# Patient Record
Sex: MALE | Race: WHITE | NOT HISPANIC OR LATINO | Employment: FULL TIME | URBAN - METROPOLITAN AREA
[De-identification: names, ages, dates, MRNs, and addresses within clinical notes are randomized per-mention and may not be internally consistent; named-entity substitution may affect disease eponyms.]

---

## 2017-01-15 DIAGNOSIS — I10 HYPERTENSION GOAL BP (BLOOD PRESSURE) < 140/90: Primary | ICD-10-CM

## 2017-01-15 NOTE — TELEPHONE ENCOUNTER
Atenolol      Last Written Prescription Date: 12/13/16  Last Fill Quantity: 30, # refills: 3    Last Office Visit with [facility], [facility] or [facility] prescribing provider:  12/14/16   Future Office Visit:        BP Readings from Last 3 Encounters:   12/14/16 136/88   10/05/15 124/84   07/02/15 132/84

## 2017-01-16 RX ORDER — ATENOLOL 50 MG/1
50 TABLET ORAL DAILY
Qty: 90 TABLET | Refills: 1 | Status: SHIPPED | OUTPATIENT
Start: 2017-01-16 | End: 2017-07-19

## 2017-07-07 ENCOUNTER — TELEPHONE (OUTPATIENT)
Dept: FAMILY MEDICINE | Facility: CLINIC | Age: 42
End: 2017-07-07

## 2017-07-07 NOTE — LETTER
[facility]  [address] [place] [address]  Phone: [phone]  Fax: [phone]    July 7, 2017    Bon Perkins  [address] [facility] [address]            To Whom it May Concern,    Bon Perkins has a medical illness. Please excuse him from work Jule 6 and July 7th.       Sincerely,      Justin Acosta MD

## 2017-07-07 NOTE — TELEPHONE ENCOUNTER
Reason for Call:  Other    Detailed comments: Bon has a flare of his gout in his Right ankle. He missed work yesterday and today. He has medication to treat. He wants to know if Dr Acosta can write a work note to excuse him or if he has to be seen.   He can have his wife  the note at the .    Phone Number Patient can be reached at: Home number on file [phone] (home)    Best Time: anytime    Can we leave a detailed message on this number? YES    Call taken on 7/7/2017 at 8:44 AM by Gypsy Simpson

## 2017-07-07 NOTE — TELEPHONE ENCOUNTER
Dr Acosta-Letter pended if you are ok with request. Please sign and print if acceptable. Dixie Lockhart RN

## 2017-07-19 DIAGNOSIS — I10 HYPERTENSION GOAL BP (BLOOD PRESSURE) < 140/90: ICD-10-CM

## 2017-07-20 RX ORDER — ATENOLOL 50 MG/1
TABLET ORAL
Qty: 90 TABLET | Refills: 1 | Status: SHIPPED | OUTPATIENT
Start: 2017-07-20 | End: 2017-07-21

## 2017-07-20 NOTE — TELEPHONE ENCOUNTER
atenolol (TENORMIN) 50 MG tablet      Last Written Prescription Date: 1/16/2017  Last Fill Quantity: 90, # refills: 1    Last Office Visit with [facility], [facility] or [facility] prescribing provider:  12/14/2016   Future Office Visit:    Next 5 appointments (look out 90 days)     Jul 21, 2017 12:40 PM CDT   SHORT with Justin Acosta MD   [facility]    [address] [place] [address] [phone]                    BP Readings from Last 3 Encounters:   12/14/16 136/88   10/05/15 124/84   07/02/15 132/84

## 2017-07-21 ENCOUNTER — OFFICE VISIT (OUTPATIENT)
Dept: FAMILY MEDICINE | Facility: CLINIC | Age: 42
End: 2017-07-21
Payer: COMMERCIAL

## 2017-07-21 VITALS
HEIGHT: 74 IN | RESPIRATION RATE: 16 BRPM | SYSTOLIC BLOOD PRESSURE: 140 MMHG | HEART RATE: 82 BPM | TEMPERATURE: 99.3 F | DIASTOLIC BLOOD PRESSURE: 96 MMHG | BODY MASS INDEX: 36.83 KG/M2 | WEIGHT: 287 LBS

## 2017-07-21 DIAGNOSIS — I10 HYPERTENSION GOAL BP (BLOOD PRESSURE) < 140/90: ICD-10-CM

## 2017-07-21 DIAGNOSIS — E79.0 HYPERURICEMIA: ICD-10-CM

## 2017-07-21 PROCEDURE — 99214 OFFICE O/P EST MOD 30 MIN: CPT | Performed by: FAMILY MEDICINE

## 2017-07-21 RX ORDER — ATENOLOL 50 MG/1
50 TABLET ORAL DAILY
Qty: 90 TABLET | Refills: 3 | Status: SHIPPED | OUTPATIENT
Start: 2017-07-21 | End: 2017-08-16

## 2017-07-21 RX ORDER — INDOMETHACIN 25 MG/1
25-50 CAPSULE ORAL
Qty: 30 CAPSULE | Refills: 3 | Status: SHIPPED | OUTPATIENT
Start: 2017-07-21 | End: 2020-08-26

## 2017-07-21 RX ORDER — ALLOPURINOL 300 MG/1
300 TABLET ORAL DAILY
Qty: 90 TABLET | Refills: 3 | Status: SHIPPED | OUTPATIENT
Start: 2017-07-21 | End: 2017-08-16

## 2017-07-21 RX ORDER — LISINOPRIL 10 MG/1
10 TABLET ORAL DAILY
Qty: 30 TABLET | Refills: 1 | Status: SHIPPED | OUTPATIENT
Start: 2017-07-21 | End: 2017-08-16 | Stop reason: DRUGHIGH

## 2017-07-21 NOTE — LETTER
[facility]  [address] [place] [address]  Phone: [phone]  Fax: [phone]    07/21/17            To Kacey Lucio,      Bon was seen in clinic today for follow up on his blood pressure. He requested we send you a fax stating he was seen. Dr. Acosta has reviewed his medication and blood pressure  and has made a plan with Bon for better blood pressure control. If you have any questions please contact me at the clinic.            Sincerely,      Nurys Luke

## 2017-07-21 NOTE — NURSING NOTE
"Chief Complaint   Patient presents with     Hypertension       Initial BP (!) 140/96  Pulse 82  Temp 99.3  F (37.4  C) (Tympanic)  Resp 16  Ht 6' 2\" (1.88 m)  Wt 287 lb (130.2 kg)  BMI 36.85 kg/m2 Estimated body mass index is 36.85 kg/(m^2) as calculated from the following:    Height as of this encounter: 6' 2\" (1.88 m).    Weight as of this encounter: 287 lb (130.2 kg).  Medication Reconciliation: complete    Health Maintenance that is potentially due pending provider review:  BP        Is there anyone who you would like to be able to receive your results? No  If yes have patient fill out IRMA    "

## 2017-07-21 NOTE — PROGRESS NOTES
"  SUBJECTIVE:                                                    Bon Perkins is a 41 year old male who presents to clinic today for the following health issues:  Chief Complaint   Patient presents with     Hypertension      Hypertension Follow-up      Outpatient blood pressures are not being checked.    Low Salt Diet: no added salt        Amount of exercise or physical activity: no    Problems taking medications regularly: No    Medication side effects: none    BP was 168/100 when checked.    He has been on atenolol for years.  No side effects noted.    Weight up 30# in the past 2 years after stopping chewing tobacco.     Occupation: .     Wt Readings from Last 5 Encounters:   07/21/17 287 lb (130.2 kg)   12/14/16 287 lb (130.2 kg)   10/05/15 274 lb 9.6 oz (124.6 kg)   07/02/15 257 lb (116.6 kg)   06/26/15 258 lb 6.4 oz (117.2 kg)     Patient Active Problem List   Diagnosis     ESOPHAGEAL REFLUX     Arthropathy     Gout     CARDIOVASCULAR SCREENING; LDL GOAL LESS THAN 130     Hyperuricemia     Hypertension goal BP (blood pressure) < 140/90     Preseptal cellulitis      Alcohol:about 2-3 times weekly    ROS:  One gout spell right ankle around 7/4.  He was on indomethacin.  Gout much better on the allopurinol.  He feels the flare was from eating and drinking more around 7/4  Resp: No coughing, wheezing or shortness of breath  CV: No chest pains or palpitations    OBJECTIVE:Blood pressure (!) 140/96, pulse 82, temperature 99.3  F (37.4  C), temperature source Tympanic, resp. rate 16, height 6' 2\" (1.88 m), weight 287 lb (130.2 kg). BMI=Body mass index is 36.85 kg/(m^2).  GENERAL APPEARANCE ADULT: Alert, no acute distress, obese     ASSESSMENT:   (I10) Hypertension goal BP (blood pressure) < 140/90  Comment: RUNNING HIGH  Plan: atenolol (TENORMIN) 50 MG tablet        Continue the atenolol daily.   Add lisinopril 10mg daily.   Schedule an appointment with clinic RN in 2 weeks to recheck blood pressure and " check potassium and creatinine blood tests (chem8)    Lifestyle changes that can lower blood pressure include:  Limiting sodium in the diet.  Goal of <2.4 grams daily.  Avoiding salty and prepared foods and not adding salt at the table can help.   Regular exercise at least 30 minutes most days of the week.   Weight loss can help even if not dramatic or down to normal BMI range.  DASH type diet can actually lower blood pressure.   Cutting back on alcohol can help for women drinking more than a drink per day and men more than 2 drinks per day on average.   Quitting smoking can help.      (E79.0) Hyperuricemia  Comment: recent gout  Plan: lisinopril (PRINIVIL/ZESTRIL) 10 MG tablet,         allopurinol (ZYLOPRIM) 300 MG tablet,         indomethacin (INDOCIN) 25 MG capsule, RN HTN         MGMT        If you continue to experience gout spells, let me know and we can increase the allopurinol.

## 2017-07-21 NOTE — MR AVS SNAPSHOT
After Visit Summary   7/21/2017    Bon Perkins    MRN: [id number]           Patient Information     Date Of Birth          1975        Visit Information        Provider Department      7/21/2017 12:40 PM Justin Acosta MD [facility]        Today's Diagnoses     Hypertension goal BP (blood pressure) < 140/90        Hyperuricemia          Care Instructions    ASSESSMENT:   (I10) Hypertension goal BP (blood pressure) < 140/90  Comment: RUNNING HIGH  Plan: atenolol (TENORMIN) 50 MG tablet        Continue the atenolol daily.   Add lisinopril 10mg daily.   Schedule an appointment with clinic RN in 2 weeks to recheck blood pressure and check potassium and creatinine blood tests    Lifestyle changes that can lower blood pressure include:  Limiting sodium in the diet.  Goal of <2.4 grams daily.  Avoiding salty and prepared foods and not adding salt at the table can help.   Regular exercise at least 30 minutes most days of the week.   Weight loss can help even if not dramatic or down to normal BMI range.  DASH type diet can actually lower blood pressure.   Cutting back on alcohol can help for women drinking more than a drink per day and men more than 2 drinks per day on average.   Quitting smoking can help.      (E79.0) Hyperuricemia  Comment: recent gout  Plan: lisinopril (PRINIVIL/ZESTRIL) 10 MG tablet,         allopurinol (ZYLOPRIM) 300 MG tablet,         indomethacin (INDOCIN) 25 MG capsule, RN HTN         MGMT        If you continue to experience gout spells, let me know and we can increase the allopurinol.          Follow-ups after your visit        Who to contact     If you have questions or need follow up information about today's clinic visit or your schedule please contact [facility] directly at [phone].  Normal or non-critical lab and imaging results will be communicated to you by MyChart, letter or phone within 4 business days after the clinic has  "received the results. If you do not hear from us within 7 days, please contact the clinic through [employer] or phone. If you have a critical or abnormal lab result, we will notify you by phone as soon as possible.  Submit refill requests through [employer] or call your pharmacy and they will forward the refill request to us. Please allow 3 business days for your refill to be completed.          Additional Information About Your Visit        [employer] Information     [employer] lets you send messages to your doctor, view your test results, renew your prescriptions, schedule appointments and more. To sign up, go to [email or web address] . Click on \"Log in\" on the left side of the screen, which will take you to the Welcome page. Then click on \"Sign up Now\" on the right side of the page.     You will be asked to enter the access code listed below, as well as some personal information. Please follow the directions to create your username and password.     Your access code is: [email or web address]  Expires: 10/19/2017  1:22 PM     Your access code will  in 90 days. If you need help or a new code, please call your [facility] clinic or [phone].        Care EveryWhere ID     This is your Care EveryWhere ID. This could be used by other organizations to access your [facility] medical records  [id number]        Your Vitals Were     Pulse Temperature Respirations Height BMI (Body Mass Index)       82 99.3  F (37.4  C) (Tympanic) 16 6' 2\" (1.88 m) 36.85 kg/m2        Blood Pressure from Last 3 Encounters:   17 (!) 140/96   16 136/88   10/05/15 124/84    Weight from Last 3 Encounters:   17 287 lb (130.2 kg)   16 287 lb (130.2 kg)   10/05/15 274 lb 9.6 oz (124.6 kg)              We Performed the Following     RN HTN MGMT          Today's Medication Changes          These changes are accurate as of: 17  1:22 PM.  If you have any questions, ask your nurse or doctor.               Start taking these medicines.     "    Dose/Directions    lisinopril 10 MG tablet   Commonly known as:  PRINIVIL/ZESTRIL   Used for:  Hyperuricemia   Started by:  Justin Acosta MD        Dose:  10 mg   Take 1 tablet (10 mg) by mouth daily   Quantity:  30 tablet   Refills:  1         These medicines have changed or have updated prescriptions.        Dose/Directions    atenolol 50 MG tablet   Commonly known as:  TENORMIN   This may have changed:  See the new instructions.   Used for:  Hypertension goal BP (blood pressure) < 140/90   Changed by:  Justin Acosta MD        Dose:  50 mg   Take 1 tablet (50 mg) by mouth daily   Quantity:  90 tablet   Refills:  3            Where to get your medicines      These medications were sent to [place] [address]     Phone:  [phone]     allopurinol 300 MG tablet    atenolol 50 MG tablet    indomethacin 25 MG capsule    lisinopril 10 MG tablet                Primary Care Provider Office Phone # Fax #    Justin Acosta [phone]       [place] [id number]        Equal Access to Services     [facility] : Hadii lan ku hadasho Soomaali, waaxda luqadaha, qaybta kaalmada adeegyada, myra gomez hayace rees . So [facility] [phone].    ATENCIÓN: Si habla español, tiene a mayberry disposición servicios gratuitos de asistencia lingüística. [facility] [phone].    We comply with applicable federal civil rights laws and [place] laws. We do not discriminate on the basis of race, color, national origin, age, disability sex, sexual orientation or gender identity.            Thank you!     Thank you for choosing [facility]  for your care. Our goal is always to provide you with excellent care. Hearing back from our patients is one way we can continue to improve our services. Please take a few minutes to complete the written survey that  you may receive in the mail after your visit with us. Thank you!             Your Updated Medication List - Protect others around you: Learn how to safely use, store and throw away your medicines at www.disposemymeds.org.          This list is accurate as of: 7/21/17  1:22 PM.  Always use your most recent med list.                   Brand Name Dispense Instructions for use Diagnosis    allopurinol 300 MG tablet    ZYLOPRIM    90 tablet    Take 1 tablet (300 mg) by mouth daily    Hyperuricemia       atenolol 50 MG tablet    TENORMIN    90 tablet    Take 1 tablet (50 mg) by mouth daily    Hypertension goal BP (blood pressure) < 140/90       indomethacin 25 MG capsule    INDOCIN    30 capsule    Take 1-2 capsules (25-50 mg) by mouth 3 times daily (with meals)    Hyperuricemia       lisinopril 10 MG tablet    PRINIVIL/ZESTRIL    30 tablet    Take 1 tablet (10 mg) by mouth daily    Hyperuricemia

## 2017-07-21 NOTE — PATIENT INSTRUCTIONS
ASSESSMENT:   (I10) Hypertension goal BP (blood pressure) < 140/90  Comment: RUNNING HIGH  Plan: atenolol (TENORMIN) 50 MG tablet        Continue the atenolol daily.   Add lisinopril 10mg daily.   Schedule an appointment with clinic RN in 2 weeks to recheck blood pressure and check potassium and creatinine blood tests    Lifestyle changes that can lower blood pressure include:  Limiting sodium in the diet.  Goal of <2.4 grams daily.  Avoiding salty and prepared foods and not adding salt at the table can help.   Regular exercise at least 30 minutes most days of the week.   Weight loss can help even if not dramatic or down to normal BMI range.  DASH type diet can actually lower blood pressure.   Cutting back on alcohol can help for women drinking more than a drink per day and men more than 2 drinks per day on average.   Quitting smoking can help.      (E79.0) Hyperuricemia  Comment: recent gout  Plan: lisinopril (PRINIVIL/ZESTRIL) 10 MG tablet,         allopurinol (ZYLOPRIM) 300 MG tablet,         indomethacin (INDOCIN) 25 MG capsule, RN HTN         MGMT        If you continue to experience gout spells, let me know and we can increase the allopurinol.

## 2017-07-21 NOTE — LETTER
[facility]  [address] [place] [address]  [phone]        June 27, 2018    Bon Perkins  [address] [facility] [address]              Dear Bon Perkins    This is to remind you that your Basic profile is due.    You may call our office at [phone] to schedule an appointment.    Please disregard this notice if you have already had your labs drawn or made an appointment.        Sincerely,        Justin Acosta MD

## 2017-07-23 ENCOUNTER — OFFICE VISIT (OUTPATIENT)
Dept: URGENT CARE | Facility: URGENT CARE | Age: 42
End: 2017-07-23
Payer: COMMERCIAL

## 2017-07-23 VITALS
TEMPERATURE: 98.9 F | SYSTOLIC BLOOD PRESSURE: 151 MMHG | BODY MASS INDEX: 37.39 KG/M2 | HEART RATE: 76 BPM | DIASTOLIC BLOOD PRESSURE: 93 MMHG | WEIGHT: 291.2 LBS | OXYGEN SATURATION: 98 %

## 2017-07-23 DIAGNOSIS — M10.9 ACUTE GOUTY ARTHRITIS: Primary | ICD-10-CM

## 2017-07-23 PROCEDURE — 99207 ZZC NO BILLABLE SERVICE THIS VISIT: CPT | Performed by: NURSE PRACTITIONER

## 2017-07-23 NOTE — PROGRESS NOTES
Gout has improved needs a note to return to work.  No intervention done. Will write a note for him to return to work.  No charge visit as no assessment , intervention required.       Blood pressure mild elevated. Has started lisinopril and has follow-up in 2 weeks with Dr Acosta.  He will continue to monitor if remains elevated will follow-up sooner with Primary Care.      Margo Palma CNP

## 2017-07-23 NOTE — LETTER
[facility]  [address] [address]  Phone: [phone]  Fax: [phone]    July 23, 2017        Bon Perkins  [address] [facility] [address]          To whom it may concern:    RE: Bon Perkins    Patient was seen and treated today at our clinic.  Patient may return to work with no restricitons:    Please contact me for questions or concerns.      Sincerely,        TANNER Bermeo CNP

## 2017-07-23 NOTE — LETTER
[facility]  [address] [address]  Phone: [phone]  Fax: [phone]    July 23, 2017        Bon Perkins  [address] [facility] [address]          To whom it may concern:    RE: Bon Perkins    Patient was seen and treated today at our clinic and missed work.7/20, 7/21, and 7/22/2017.     Patient can return to work 7/24/2017 with no restrictions.     Please contact me for questions or concerns.      Sincerely,        TANNER Bermeo CNP

## 2017-07-23 NOTE — MR AVS SNAPSHOT
After Visit Summary   7/23/2017    Bon Perkins    MRN: [id number]           Patient Information     Date Of Birth          1975        Visit Information        Provider Department      7/23/2017 2:55 PM Margo Palma APRN [facility]        Care Instructions    It was discovered today your blood pressure was elevated. Elevated blood pressure is defined as blood pressure greater then 140/80.   Continue to monitor your blood pressure and return for a nurse only blood pressure recheck.  You will be contacted by a nurse in the next two week if you a blood pressure has not been obtained.      If it remains elevated follow-up with your primary care provider or return.  Indications discussed when to go to the Emergency Department         Discharge Instructions: Taking Your Blood Pressure  Blood pressure is the force of blood as it moves from the heart through the blood vessels. You can take your own blood pressure reading using a digital monitor. Take readings as often as your healthcare provider instructs. Take your readings each time in the same way, using the same arm. Here are guidelines for taking your blood pressure.  The American Heart Association (AHA) recommends purchasing a blood pressure monitor that is validated and approved by the Association for the Advancement of Medical Instrumentation, the [place] Hypertension Society, and the International Protocol for the Validation of Automated BP Measuring Devices. If the blood pressure monitor is for a senior adult, a pregnant woman, or a child, make certain it is validated for use with such a population. For the most reliable readings, the AHA recommends an automatic, cuff-style, upper arm (bicep) monitor. The readings from finger and wrist monitors are not as reliable as the upper arm monitor.        Step 1. Relax      Wait at least a half hour after smoking, eating, or exercising. Do not drink coffee, tea,  soda, or other caffeinated beverages before checking your blood pressure.     Sit comfortably at a table. Place the monitor near you.    Rest for a few minutes before you begin.        Step 2. Wrap the cuff      Place your arm on the table, palm up. Put your arm in a position that is level with your heart. Wrap the cuff around your upper arm, about an inch above your elbow. It s best to wrap the cuff on bare skin, not over clothing.    Make sure your cuff fits. If it doesn t wrap around your upper arm, order a larger cuff. A cuff that is too large or too small can result in an inaccurate blood pressure reading.           Step 3. Inflate the cuff      Pump the cuff until the scale reads 200. If you have a self-inflating cuff, push the button that starts the pump.    The cuff will tighten, then loosen.    The numbers will change. When they stop changing, your blood pressure reading will appear.    If you get a reading that is too high or too low for you, relax for a few minutes. Then do the test again.    Step 4. Write down the results    Write down your blood pressure numbers. Gorge the date and time. Keep your results in one place, such as a notebook.    Remove the cuff from your arm. Turn off the machine.    Take the readings with you to your medical appointments.    If you start a new blood pressure medicine, or change a blood pressure medicine dose, note the day you started the new drug or dosage on your blood pressure recording sheet. This will help your healthcare provider monitor the effect of medication changes.     Date Last Reviewed: 4/27/2016 2000-2017 [employer]. [address] [place] [address], [place] [address]. All rights reserved. This information is not intended as a substitute for professional medical care. Always follow your healthcare professional's instructions.                Follow-ups after your visit        Your next 10 appointments already scheduled     Aug 04, 2017  3:00 PM CDT    Nurse Only with [place] NB RN   [facility] ([facility])    [address] [place] [address] [phone]              Who to contact     If you have questions or need follow up information about today's clinic visit or your schedule please contact [facility] directly at [phone].  Normal or non-critical lab and imaging results will be communicated to you by MyChart, letter or phone within 4 business days after the clinic has received the results. If you do not hear from us within 7 days, please contact the clinic through MyChart or phone. If you have a critical or abnormal lab result, we will notify you by phone as soon as possible.  Submit refill requests through [employer] or call your pharmacy and they will forward the refill request to us. Please allow 3 business days for your refill to be completed.          Additional Information About Your Visit        Care EveryWhere ID     This is your Care EveryWhere ID. This could be used by other organizations to access your [facility] medical records  [id number]        Your Vitals Were     Pulse Temperature Pulse Oximetry BMI (Body Mass Index)          76 98.9  F (37.2  C) (Tympanic) 98% 37.39 kg/m2         Blood Pressure from Last 3 Encounters:   07/23/17 (!) 151/93   07/21/17 (!) 140/96   12/14/16 136/88    Weight from Last 3 Encounters:   07/23/17 291 lb 3.2 oz (132.1 kg)   07/21/17 287 lb (130.2 kg)   12/14/16 287 lb (130.2 kg)              Today, you had the following     No orders found for display       Primary Care Provider Office Phone # Fax #    Justin Acosta [phone]       [facility] [address] [place] [address]        Equal Access to Services     NEVILLE QUIROGA : Hernandez Milner, luis condon, qaybta kadamion cintron, myar terrell. So [facility] [phone].    ATENCIÓN: Vikas lagos,  tiene a mayberry disposición servicios gratuitos de asistencia lingüística. Kip anderson [phone].    We comply with applicable federal civil rights laws and [place] laws. We do not discriminate on the basis of race, color, national origin, age, disability sex, sexual orientation or gender identity.            Thank you!     Thank you for choosing [facility]  for your care. Our goal is always to provide you with excellent care. Hearing back from our patients is one way we can continue to improve our services. Please take a few minutes to complete the written survey that you may receive in the mail after your visit with us. Thank you!             Your Updated Medication List - Protect others around you: Learn how to safely use, store and throw away your medicines at www.disposemymeds.org.          This list is accurate as of: 7/23/17  3:06 PM.  Always use your most recent med list.                   Brand Name Dispense Instructions for use Diagnosis    allopurinol 300 MG tablet    ZYLOPRIM    90 tablet    Take 1 tablet (300 mg) by mouth daily    Hyperuricemia       atenolol 50 MG tablet    TENORMIN    90 tablet    Take 1 tablet (50 mg) by mouth daily    Hypertension goal BP (blood pressure) < 140/90       indomethacin 25 MG capsule    INDOCIN    30 capsule    Take 1-2 capsules (25-50 mg) by mouth 3 times daily (with meals)    Hyperuricemia       lisinopril 10 MG tablet    PRINIVIL/ZESTRIL    30 tablet    Take 1 tablet (10 mg) by mouth daily    Hyperuricemia

## 2017-07-23 NOTE — PATIENT INSTRUCTIONS
It was discovered today your blood pressure was elevated. Elevated blood pressure is defined as blood pressure greater then 140/80.   Continue to monitor your blood pressure and return for a nurse only blood pressure recheck.  You will be contacted by a nurse in the next two week if you a blood pressure has not been obtained.      If it remains elevated follow-up with your primary care provider or return.  Indications discussed when to go to the Emergency Department         Discharge Instructions: Taking Your Blood Pressure  Blood pressure is the force of blood as it moves from the heart through the blood vessels. You can take your own blood pressure reading using a digital monitor. Take readings as often as your healthcare provider instructs. Take your readings each time in the same way, using the same arm. Here are guidelines for taking your blood pressure.  The American Heart Association (AHA) recommends purchasing a blood pressure monitor that is validated and approved by the Association for the Advancement of Medical Instrumentation, the [place] Hypertension Society, and the International Protocol for the Validation of Automated BP Measuring Devices. If the blood pressure monitor is for a senior adult, a pregnant woman, or a child, make certain it is validated for use with such a population. For the most reliable readings, the AHA recommends an automatic, cuff-style, upper arm (bicep) monitor. The readings from finger and wrist monitors are not as reliable as the upper arm monitor.        Step 1. Relax      Wait at least a half hour after smoking, eating, or exercising. Do not drink coffee, tea, soda, or other caffeinated beverages before checking your blood pressure.     Sit comfortably at a table. Place the monitor near you.    Rest for a few minutes before you begin.        Step 2. Wrap the cuff      Place your arm on the table, palm up. Put your arm in a position that is level with your heart. Wrap the cuff  around your upper arm, about an inch above your elbow. It s best to wrap the cuff on bare skin, not over clothing.    Make sure your cuff fits. If it doesn t wrap around your upper arm, order a larger cuff. A cuff that is too large or too small can result in an inaccurate blood pressure reading.           Step 3. Inflate the cuff      Pump the cuff until the scale reads 200. If you have a self-inflating cuff, push the button that starts the pump.    The cuff will tighten, then loosen.    The numbers will change. When they stop changing, your blood pressure reading will appear.    If you get a reading that is too high or too low for you, relax for a few minutes. Then do the test again.    Step 4. Write down the results    Write down your blood pressure numbers. Gorge the date and time. Keep your results in one place, such as a notebook.    Remove the cuff from your arm. Turn off the machine.    Take the readings with you to your medical appointments.    If you start a new blood pressure medicine, or change a blood pressure medicine dose, note the day you started the new drug or dosage on your blood pressure recording sheet. This will help your healthcare provider monitor the effect of medication changes.     Date Last Reviewed: 4/27/2016 2000-2017 [employer]. [address] [place] [address], [place] [address]. All rights reserved. This information is not intended as a substitute for professional medical care. Always follow your healthcare professional's instructions.

## 2017-08-15 ENCOUNTER — TELEPHONE (OUTPATIENT)
Dept: FAMILY MEDICINE | Facility: CLINIC | Age: 42
End: 2017-08-15

## 2017-08-16 ENCOUNTER — ALLIED HEALTH/NURSE VISIT (OUTPATIENT)
Dept: FAMILY MEDICINE | Facility: CLINIC | Age: 42
End: 2017-08-16
Payer: COMMERCIAL

## 2017-08-16 VITALS — SYSTOLIC BLOOD PRESSURE: 144 MMHG | RESPIRATION RATE: 18 BRPM | DIASTOLIC BLOOD PRESSURE: 90 MMHG | HEART RATE: 80 BPM

## 2017-08-16 DIAGNOSIS — E79.0 HYPERURICEMIA: ICD-10-CM

## 2017-08-16 DIAGNOSIS — I10 HYPERTENSION GOAL BP (BLOOD PRESSURE) < 140/90: ICD-10-CM

## 2017-08-16 PROCEDURE — 99207 ZZC NO CHARGE NURSE ONLY: CPT

## 2017-08-16 RX ORDER — ALLOPURINOL 300 MG/1
300 TABLET ORAL DAILY
Qty: 90 TABLET | Refills: 3 | Status: SHIPPED | OUTPATIENT
Start: 2017-08-16 | End: 2018-07-26

## 2017-08-16 RX ORDER — ATENOLOL 50 MG/1
50 TABLET ORAL DAILY
Qty: 90 TABLET | Refills: 3 | Status: SHIPPED | OUTPATIENT
Start: 2017-08-16 | End: 2018-07-26

## 2017-08-16 RX ORDER — LISINOPRIL 20 MG/1
20 TABLET ORAL DAILY
Qty: 30 TABLET | Refills: 1 | Status: SHIPPED | OUTPATIENT
Start: 2017-08-16 | End: 2017-09-21

## 2017-08-16 NOTE — PROGRESS NOTES
Bon ePrkins is being followed for Blood Pressure management.    NURSING ASSESSMENT/PLAN:  Blood pressure reading today is not at the provider specified goal of <140/90.    Current blood pressure medication(s):  Lisinopril 10 mg  1.  The patient will dosage change: lisinopril 20 mg.     2.  We will be checking a metabolic lab panel today.      3.  Follow up instructions include:     Next Nurse visit: 2 weeks.    SUBJECTIVE:                                                    The patient is taking medication as prescribed and is tolerating well.   Patient is not monitoring Blood Pressure at home.   Last 3 home readings none  In addition notes: none    Out of the following complicating factors: Cough, Headache, Lightheadedness, Shortness of breath, Fatigue, Nausea, Sexual Dysfunction, New onset of swelling or edema, Weakness and New onset of Chest Pain, the patient reports:  None    OBJECTIVE:                                                    Is pulse 55 or greater? - Yes  Pulse Readings from Last 1 Encounters:   08/16/17 80     Today's BP completed using cuff size: large on right side arm.  BP Readings from Last 3 Encounters:   08/16/17 144/90   07/23/17 (!) 151/93   07/21/17 (!) 140/96       Current Outpatient Prescriptions   Medication Sig Dispense Refill     atenolol (TENORMIN) 50 MG tablet Take 1 tablet (50 mg) by mouth daily 90 tablet 3     lisinopril (PRINIVIL/ZESTRIL) 10 MG tablet Take 1 tablet (10 mg) by mouth daily 30 tablet 1     allopurinol (ZYLOPRIM) 300 MG tablet Take 1 tablet (300 mg) by mouth daily 90 tablet 3     indomethacin (INDOCIN) 25 MG capsule Take 1-2 capsules (25-50 mg) by mouth 3 times daily (with meals) 30 capsule 3     Potassium   Date Value Ref Range Status   12/14/2016 3.7 3.4 - 5.3 mmol/L Final     Creatinine   Date Value Ref Range Status   12/14/2016 0.78 0.66 - 1.25 mg/dL Final     Urea Nitrogen   Date Value Ref Range Status   12/14/2016 14 7 - 30 mg/dL Final     GFR Estimate   Date Value  Ref Range Status   12/14/2016 >90  Non  GFR Calc   >60 mL/min/1.7m2 Final      A baseline potassium, creatinine, BUN, GFR has been done within past 12 months    Education:  general discussion/verbal explanation  Limit dietary sodium intake between 1500-2000mg every day  Regular aerobic exercise.  Discussed habit change regarding diet/weight loss      Professional Reference click here   Copy of current RN HTN MGMT order or refer to Other Orders:  Add blood pressure goal to problem list:  <140/90    Patient is being referred to RN Hypertension Program for the following diagnoses:  Hypertension    RN will confirm Potassium, Creatinine, BUN (or BMP) have been done within the past 12 months.  RN will order follow-up labs according to RN protocol.      According to RN Protocol, start or titrate:     Lisinopril (Zestril, Prinivil) starting dose 10mg may be titrated to 20mg.  Increase by doubling mg every 2 to 3 weeks.  Usual dose 10 mg to 40 mg orally once daily.  Most common side effect: cough.    If blood pressure not at goal after current medication titrated, the following medication(s) may be prescribed and titrated.    Chlorthalidone starting dose 12.5mg may be titrated to 25mg.   Increase by doubling mg every 2 to 3 weeks.  Usual dose 12.5 mg to 25 mg orally once daily.    If blood pressure not at goal after maximum dose reached, consult provider.    If blood pressure at goal, follow up: with Hypertension Nurse in 6 mos.     Contraindications and dosing considerations for current and recommended medications have been reviewed. Yes  Dosage adjustment made based on patient specific, physician directed, care plan.  Adri Cobb RN

## 2017-08-16 NOTE — MR AVS SNAPSHOT
After Visit Summary   8/16/2017    Bon Perkins    MRN: [id number]           Patient Information     Date Of Birth          1975        Visit Information        Provider Department      8/16/2017 9:00 AM [place] NB RN [facility]        Today's Diagnoses     Hyperuricemia        Hypertension goal BP (blood pressure) < 140/90          Care Instructions      NURSING ASSESSMENT/PLAN:  Blood pressure reading today is not at the provider specified goal of <140/90.    Current blood pressure medication(s):  Lisinopril 10 mg  1.  The patient will dosage change: lisinopril 20 mg.          2.  Follow up instructions include:     Next Nurse visit: 2 weeks.          Follow-ups after your visit        Who to contact     If you have questions or need follow up information about today's clinic visit or your schedule please contact [facility] directly at [phone].  Normal or non-critical lab and imaging results will be communicated to you by MyChart, letter or phone within 4 business days after the clinic has received the results. If you do not hear from us within 7 days, please contact the clinic through MyChart or phone. If you have a critical or abnormal lab result, we will notify you by phone as soon as possible.  Submit refill requests through [employer] or call your pharmacy and they will forward the refill request to us. Please allow 3 business days for your refill to be completed.          Additional Information About Your Visit        Care EveryWhere ID     This is your Care EveryWhere ID. This could be used by other organizations to access your [place] medical records  [id number]        Your Vitals Were     Pulse Respirations                80 18           Blood Pressure from Last 3 Encounters:   08/16/17 144/90   07/23/17 (!) 151/93   07/21/17 (!) 140/96    Weight from Last 3 Encounters:   07/23/17 291 lb 3.2 oz (132.1 kg)   07/21/17 287 lb (130.2 kg)   12/14/16 287 lb  (130.2 kg)              Today, you had the following     No orders found for display         Today's Medication Changes          These changes are accurate as of: 8/16/17  9:24 AM.  If you have any questions, ask your nurse or doctor.               These medicines have changed or have updated prescriptions.        Dose/Directions    lisinopril 20 MG tablet   Commonly known as:  PRINIVIL/ZESTRIL   This may have changed:    - medication strength  - how much to take   Used for:  Hypertension goal BP (blood pressure) < 140/90        Dose:  20 mg   Take 1 tablet (20 mg) by mouth daily   Quantity:  30 tablet   Refills:  1            Where to get your medicines      These medications were sent to [facility] [place] [id number]     Phone:  [phone]     allopurinol 300 MG tablet    atenolol 50 MG tablet    lisinopril 20 MG tablet                Primary Care Provider Office Phone # Fax #    Justin Acosta [phone] [address] [place] [address]        Equal Access to Services     NAGELITA QUIROGA : Hadii lan villagomez hadasho Soomaali, waaxda luqadaha, qaybta kaalmada adeegyada, waxleena gomez hayace rees . So [facility] [phone].    ATENCIÓN: Si habla español, tiene a mayberry disposición servicios gratuitos de asistencia lingüística. Llame al [phone].    We comply with applicable federal civil rights laws and [place] laws. We do not discriminate on the basis of race, color, national origin, age, disability sex, sexual orientation or gender identity.            Thank you!     Thank you for choosing [facility]  for your care. Our goal is always to provide you with excellent care. Hearing back from our patients is one way we can continue to improve our services. Please take a few minutes to complete the written survey that you may receive in the mail after your visit with us. Thank you!              Your Updated Medication List - Protect others around you: Learn how to safely use, store and throw away your medicines at www.disposemymeds.org.          This list is accurate as of: 8/16/17  9:24 AM.  Always use your most recent med list.                   Brand Name Dispense Instructions for use Diagnosis    allopurinol 300 MG tablet    ZYLOPRIM    90 tablet    Take 1 tablet (300 mg) by mouth daily    Hyperuricemia       atenolol 50 MG tablet    TENORMIN    90 tablet    Take 1 tablet (50 mg) by mouth daily    Hypertension goal BP (blood pressure) < 140/90       indomethacin 25 MG capsule    INDOCIN    30 capsule    Take 1-2 capsules (25-50 mg) by mouth 3 times daily (with meals)    Hyperuricemia       lisinopril 20 MG tablet    PRINIVIL/ZESTRIL    30 tablet    Take 1 tablet (20 mg) by mouth daily    Hypertension goal BP (blood pressure) < 140/90

## 2017-08-16 NOTE — PATIENT INSTRUCTIONS
NURSING ASSESSMENT/PLAN:  Blood pressure reading today is not at the provider specified goal of <140/90.    Current blood pressure medication(s):  Lisinopril 10 mg  1.  The patient will dosage change: lisinopril 20 mg.          2.  Follow up instructions include:     Next Nurse visit: 2 weeks.

## 2017-09-21 ENCOUNTER — ALLIED HEALTH/NURSE VISIT (OUTPATIENT)
Dept: FAMILY MEDICINE | Facility: CLINIC | Age: 42
End: 2017-09-21
Payer: COMMERCIAL

## 2017-09-21 VITALS — HEART RATE: 76 BPM | RESPIRATION RATE: 16 BRPM | DIASTOLIC BLOOD PRESSURE: 76 MMHG | SYSTOLIC BLOOD PRESSURE: 132 MMHG

## 2017-09-21 DIAGNOSIS — I10 HYPERTENSION GOAL BP (BLOOD PRESSURE) < 140/90: Primary | ICD-10-CM

## 2017-09-21 DIAGNOSIS — Z01.30 BLOOD PRESSURE CHECK: ICD-10-CM

## 2017-09-21 PROCEDURE — 99207 ZZC NO CHARGE NURSE ONLY: CPT

## 2017-09-21 RX ORDER — LISINOPRIL 20 MG/1
20 TABLET ORAL DAILY
Qty: 90 TABLET | Refills: 0 | Status: SHIPPED | OUTPATIENT
Start: 2017-09-21 | End: 2018-01-19

## 2017-09-21 NOTE — MR AVS SNAPSHOT
After Visit Summary   9/21/2017    Bon Perkins    MRN: [id number]           Patient Information     Date Of Birth          1975        Visit Information        Provider Department      9/21/2017 11:30 AM FL NB RN [facility]        Today's Diagnoses     Hypertension goal BP (blood pressure) < 140/90    -  1    Blood pressure check           Follow-ups after your visit        Who to contact     If you have questions or need follow up information about today's clinic visit or your schedule please contact [facility] directly at [phone].  Normal or non-critical lab and imaging results will be communicated to you by MyChart, letter or phone within 4 business days after the clinic has received the results. If you do not hear from us within 7 days, please contact the clinic through MyChart or phone. If you have a critical or abnormal lab result, we will notify you by phone as soon as possible.  Submit refill requests through "[employer]" or call your pharmacy and they will forward the refill request to us. Please allow 3 business days for your refill to be completed.          Additional Information About Your Visit        Care EveryWhere ID     This is your Care EveryWhere ID. This could be used by other organizations to access your [place] medical records  [id number]        Your Vitals Were     Pulse Respirations                76 16           Blood Pressure from Last 3 Encounters:   09/21/17 132/76   08/16/17 144/90   07/23/17 (!) 151/93    Weight from Last 3 Encounters:   07/23/17 291 lb 3.2 oz (132.1 kg)   07/21/17 287 lb (130.2 kg)   12/14/16 287 lb (130.2 kg)              Today, you had the following     No orders found for display         Where to get your medicines      These medications were sent to [facility] [address] [place] [address]     Phone:  [phone]     lisinopril 20 MG  tablet          Primary Care Provider Office Phone # Fax #    Justin Acosta [phone] [address] [place] [address]        Equal Access to Services     NEVILLE QUIROGA : Hadchelsey lan villagomez neo iMlner, wasarahda luqadaha, qaybta kagabrielleda saida, myra lemon lawaldohilario terrell. So [facility] [phone].    ATENCIÓN: Si habla español, tiene a mayberry disposición servicios gratuitos de asistencia lingüística. Llame al [phone].    We comply with applicable federal civil rights laws and [place] laws. We do not discriminate on the basis of race, color, national origin, age, disability sex, sexual orientation or gender identity.            Thank you!     Thank you for choosing [facility]  for your care. Our goal is always to provide you with excellent care. Hearing back from our patients is one way we can continue to improve our services. Please take a few minutes to complete the written survey that you may receive in the mail after your visit with us. Thank you!             Your Updated Medication List - Protect others around you: Learn how to safely use, store and throw away your medicines at www.disposemymeds.org.          This list is accurate as of: 9/21/17 12:01 PM.  Always use your most recent med list.                   Brand Name Dispense Instructions for use Diagnosis    allopurinol 300 MG tablet    ZYLOPRIM    90 tablet    Take 1 tablet (300 mg) by mouth daily    Hyperuricemia       atenolol 50 MG tablet    TENORMIN    90 tablet    Take 1 tablet (50 mg) by mouth daily    Hypertension goal BP (blood pressure) < 140/90       indomethacin 25 MG capsule    INDOCIN    30 capsule    Take 1-2 capsules (25-50 mg) by mouth 3 times daily (with meals)    Hyperuricemia       lisinopril 20 MG tablet    PRINIVIL/ZESTRIL    90 tablet    Take 1 tablet (20 mg) by mouth daily    Hypertension goal BP (blood pressure) < 140/90

## 2017-09-21 NOTE — PROGRESS NOTES
PATIENT INSTRUCTIONS     1.  You will continue current medication regimen unchanged    2.  FOLLOW UP:   Follow up with: Nurse.  He would like to come in once a month if possible    3.  Limit total daily sodium to 1500-2000mg per day    Your BP Goal is: less than <140/90 consistently    Today we used a large cuff on your right arm.    BP Readings from Last 3 Encounters:   17 132/76   17 144/90   17 (!) 151/93     Pulse Readings from Last 1 Encounters:   17 76       You will need baseline lab tests done within 12 months of beginning a new blood pressure medication and during/after medication adjustment is complete.    Last Labs:  Sodium   Date Value Ref Range Status   2016 138 133 - 144 mmol/L Final      Potassium   Date Value Ref Range Status   2016 3.7 3.4 - 5.3 mmol/L Final     Urea Nitrogen   Date Value Ref Range Status   2016 14 7 - 30 mg/dL Final     Creatinine   Date Value Ref Range Status   2016 0.78 0.66 - 1.25 mg/dL Final     GFR Estimate   Date Value Ref Range Status   2016 >90  Non  GFR Calc   >60 mL/min/1.7m2 Final       Today we talked about...     The key to effective blood pressure monitorin) Take your BP medication in the morning or as directed.     2) Sit in a chair with feet flat on the floor for 5-10 minutes before checking your blood pressure.     3) Use the same arm every time.       4) Use the same machine and appropriate cuff size.       5) Check your blood pressure at the same time of day.    High Blood Pressure      Over time, high blood pressure can damage the blood vessels and vital organs. This can lead to strokes, heart disease, and kidney disease. Treating high blood pressure decreases your chances of having heart and kidney problems.  A healthy diet low in sodium and high in grains along with regular exercise have a direct effect on your blood pressure.      Several kinds of medicines are used to treat high  blood pressure. The medicines may be effective alone, or they may be used with other drugs.     How do high blood pressure medicines work?   Each type of medicine lowers blood pressure in a different way.     Diuretics: also called water pills, rid the body of excess sodium (salt) and water. This means there is less blood volume putting pressure on the vessel walls.   Examples include: hydrochlorothiazide or furosemide (Lasix).     Beta blockers: reduce the heart rate and the heart's output of blood.  Slowing the heart down a bit allows it to fill more completely in between beats.  Examples include: metoprolol, atenolol, and propranolol.     Vasodilators, ACE inhibitors, ARBs, and calcium channel blockers: lower blood pressure by relaxing and opening up narrowed blood vessels allowing more room for blood to flow.   Examples:  -Calcium Channel Blockers: diltiazem, verapamil, nifedipine, and amlodipine .     -ACE inhibitors (angiotensin-converting enzyme inhibitor): enalapril, captopril and lisinopril.     -ARBs (angiotensin receptor blockers)- irbesartan, valsartan and losartan.      -Vasodilators- nitroglycerin, isosorbide mononitrate, and hydralazine.     What should I watch out for while taking high blood pressure medicines?   When you take high blood pressure medicine, it is important to:     Take the medicine regularly, exactly as prescribed. Do not change your dosage or stop taking the medicine without talking to your provider first. It can be dangerous to suddenly stop taking blood pressure medicine.     Tell your provider about any side effects right away. You may feel dizzy or have headaches while taking these medicines. Older people may be more sensitive than younger people to the medicine's effects. It may take several weeks or months to find the best treatment for you. Make sure that you keep your follow-up appointments with your healthcare provider and let your provider know how you are tolerating your  medicine.     Check your blood pressure (or have it checked) as often as your healthcare provider advises. Ask your provider if you should have a home blood pressure monitor to check your blood pressure between visits.    Does everyone need to take medication to control high blood pressure?  No.  Some patients with elevated BP readings are able to lower their blood pressure by modifying diet and increasing activity level.    Your pharmacist is a great resource for questions regarding your medication's side effects and potential interactions.     If you are having a side effect from your medication, please contact your primary provider.     If you believe you are experiencing a life threatening reaction to your medication call 911 immediately.   Add blood pressure goal to problem list:  <140/90    Patient is being referred to RN Hypertension Program for the following diagnoses:  Hypertension    RN will confirm Potassium, Creatinine, BUN (or BMP) have been done within the past 12 months.  RN will order follow-up labs according to RN protocol.      According to RN Protocol, start or titrate:     Lisinopril (Zestril, Prinivil) starting dose 10mg may be titrated to 20mg.  Increase by doubling mg every 2 to 3 weeks.  Usual dose 10 mg to 40 mg orally once daily.  Most common side effect: cough.    If blood pressure not at goal after current medication titrated, the following medication(s) may be prescribed and titrated.    Chlorthalidone starting dose 12.5mg may be titrated to 25mg.   Increase by doubling mg every 2 to 3 weeks.  Usual dose 12.5 mg to 25 mg orally once daily.    If blood pressure not at goal after maximum dose reached, consult provider.    If blood pressure at goal, follow up: with Hypertension Nurse in 6 mos.     Contraindications and dosing considerations for current and recommended medications have been reviewed. Yes    Adri Cobb RN

## 2018-01-19 DIAGNOSIS — I10 HYPERTENSION GOAL BP (BLOOD PRESSURE) < 140/90: ICD-10-CM

## 2018-01-19 RX ORDER — LISINOPRIL 20 MG/1
TABLET ORAL
Qty: 90 TABLET | Refills: 0 | Status: SHIPPED | OUTPATIENT
Start: 2018-01-19 | End: 2018-04-24

## 2018-01-19 NOTE — TELEPHONE ENCOUNTER
"Requested Prescriptions   Pending Prescriptions Disp Refills     lisinopril (PRINIVIL/ZESTRIL) 20 MG tablet [Pharmacy Med Name: LISINOPRIL 20MG TABS] 90 tablet 0     Sig: TAKE ONE TABLET BY MOUTH EVERY DAY    ACE Inhibitors (Including Combos) Protocol Failed    1/19/2018  6:10 AM       Failed - Normal serum creatinine on file in past 12 months    Recent Labs   Lab Test  12/14/16   1546   CR  0.78            Failed - Normal serum potassium on file in past 12 months    Recent Labs   Lab Test  12/14/16   1546   POTASSIUM  3.7            Passed - Blood pressure under 140/90    BP Readings from Last 3 Encounters:   09/21/17 132/76   08/16/17 144/90   07/23/17 (!) 151/93                Passed - Recent or future visit with authorizing provider's specialty    Patient had office visit in the last year or has a visit in the next 30 days with authorizing provider.  See \"Patient Info\" tab in inbasket, or \"Choose Columns\" in Meds & Orders section of the refill encounter.            Passed - Patient is age 18 or older        Last Written Prescription Date:  9/21/2017  Last Fill Quantity: 90,  # refills: 0   [facility] Office Visit with [facility] prescribing provider:  7/21/2017   Future Office Visit:       "

## 2018-04-24 DIAGNOSIS — I10 HYPERTENSION GOAL BP (BLOOD PRESSURE) < 140/90: ICD-10-CM

## 2018-04-24 RX ORDER — LISINOPRIL 20 MG/1
TABLET ORAL
Qty: 90 TABLET | Refills: 0 | Status: SHIPPED | OUTPATIENT
Start: 2018-04-24 | End: 2018-07-26

## 2018-04-24 NOTE — TELEPHONE ENCOUNTER
"Requested Prescriptions   Pending Prescriptions Disp Refills     lisinopril (PRINIVIL/ZESTRIL) 20 MG tablet [Pharmacy Med Name: LISINOPRIL 20MG TABS] 90 tablet 0     Sig: TAKE ONE TABLET BY MOUTH EVERY DAY    ACE Inhibitors (Including Combos) Protocol Failed    4/24/2018  6:33 AM       Failed - Normal serum creatinine on file in past 12 months    Recent Labs   Lab Test  12/14/16   1546   CR  0.78            Failed - Normal serum potassium on file in past 12 months    Recent Labs   Lab Test  12/14/16   1546   POTASSIUM  3.7            Passed - Blood pressure under 140/90 in past 12 months    BP Readings from Last 3 Encounters:   09/21/17 132/76   08/16/17 144/90   07/23/17 (!) 151/93                Passed - Recent (12 mo) or future (30 days) visit within the authorizing provider's specialty    Patient had office visit in the last 12 months or has a visit in the next 30 days with authorizing provider or within the authorizing provider's specialty.  See \"Patient Info\" tab in inbasket, or \"Choose Columns\" in Meds & Orders section of the refill encounter.           Passed - Patient is age 18 or older        Last Written Prescription Date:  1/19/2018  Last Fill Quantity: 90,  # refills: 0   Last office visit: 9/21/2017 with prescribing provider:  Leaf   Future Office Visit:      "

## 2018-07-26 ENCOUNTER — OFFICE VISIT (OUTPATIENT)
Dept: FAMILY MEDICINE | Facility: CLINIC | Age: 43
End: 2018-07-26
Payer: COMMERCIAL

## 2018-07-26 VITALS
OXYGEN SATURATION: 99 % | TEMPERATURE: 98 F | WEIGHT: 291 LBS | SYSTOLIC BLOOD PRESSURE: 132 MMHG | DIASTOLIC BLOOD PRESSURE: 84 MMHG | BODY MASS INDEX: 37.36 KG/M2 | RESPIRATION RATE: 14 BRPM | HEART RATE: 76 BPM

## 2018-07-26 DIAGNOSIS — E79.0 HYPERURICEMIA: ICD-10-CM

## 2018-07-26 DIAGNOSIS — Z13.6 CARDIOVASCULAR SCREENING; LDL GOAL LESS THAN 130: ICD-10-CM

## 2018-07-26 DIAGNOSIS — I10 HYPERTENSION GOAL BP (BLOOD PRESSURE) < 140/90: Primary | ICD-10-CM

## 2018-07-26 DIAGNOSIS — K21.9 GASTROESOPHAGEAL REFLUX DISEASE WITHOUT ESOPHAGITIS: ICD-10-CM

## 2018-07-26 PROBLEM — E66.01 MORBID OBESITY (H): Status: ACTIVE | Noted: 2018-07-26

## 2018-07-26 LAB
ALBUMIN SERPL-MCNC: 3.6 G/DL (ref 3.4–5)
ALP SERPL-CCNC: 79 U/L (ref 40–150)
ALT SERPL W P-5'-P-CCNC: 105 U/L (ref 0–70)
ANION GAP SERPL CALCULATED.3IONS-SCNC: 6 MMOL/L (ref 3–14)
AST SERPL W P-5'-P-CCNC: 89 U/L (ref 0–45)
BASOPHILS # BLD AUTO: 0.1 10E9/L (ref 0–0.2)
BASOPHILS NFR BLD AUTO: 0.8 %
BILIRUB SERPL-MCNC: 0.6 MG/DL (ref 0.2–1.3)
BUN SERPL-MCNC: 17 MG/DL (ref 7–30)
CALCIUM SERPL-MCNC: 8.6 MG/DL (ref 8.5–10.1)
CHLORIDE SERPL-SCNC: 103 MMOL/L (ref 94–109)
CHOLEST SERPL-MCNC: 184 MG/DL
CO2 SERPL-SCNC: 27 MMOL/L (ref 20–32)
CREAT SERPL-MCNC: 0.77 MG/DL (ref 0.66–1.25)
DIFFERENTIAL METHOD BLD: ABNORMAL
EOSINOPHIL # BLD AUTO: 0.2 10E9/L (ref 0–0.7)
EOSINOPHIL NFR BLD AUTO: 1.4 %
ERYTHROCYTE [DISTWIDTH] IN BLOOD BY AUTOMATED COUNT: 12.2 % (ref 10–15)
GFR SERPL CREATININE-BSD FRML MDRD: >90 ML/MIN/1.7M2
GLUCOSE SERPL-MCNC: 138 MG/DL (ref 70–99)
HCT VFR BLD AUTO: 48.3 % (ref 40–53)
HDLC SERPL-MCNC: 37 MG/DL
HGB BLD-MCNC: 15.7 G/DL (ref 13.3–17.7)
IMM GRANULOCYTES # BLD: 0.2 10E9/L (ref 0–0.4)
IMM GRANULOCYTES NFR BLD: 1.9 %
LDLC SERPL CALC-MCNC: 97 MG/DL
LYMPHOCYTES # BLD AUTO: 1.8 10E9/L (ref 0.8–5.3)
LYMPHOCYTES NFR BLD AUTO: 15.8 %
MCH RBC QN AUTO: 29.6 PG (ref 26.5–33)
MCHC RBC AUTO-ENTMCNC: 32.5 G/DL (ref 31.5–36.5)
MCV RBC AUTO: 91 FL (ref 78–100)
MONOCYTES # BLD AUTO: 0.8 10E9/L (ref 0–1.3)
MONOCYTES NFR BLD AUTO: 6.8 %
NEUTROPHILS # BLD AUTO: 8.5 10E9/L (ref 1.6–8.3)
NEUTROPHILS NFR BLD AUTO: 73.3 %
NONHDLC SERPL-MCNC: 147 MG/DL
NRBC # BLD AUTO: 0 10*3/UL
NRBC BLD AUTO-RTO: 0 /100
PLATELET # BLD AUTO: 179 10E9/L (ref 150–450)
POTASSIUM SERPL-SCNC: 4 MMOL/L (ref 3.4–5.3)
PROT SERPL-MCNC: 7.6 G/DL (ref 6.8–8.8)
RBC # BLD AUTO: 5.3 10E12/L (ref 4.4–5.9)
SODIUM SERPL-SCNC: 136 MMOL/L (ref 133–144)
TRIGL SERPL-MCNC: 251 MG/DL
URATE SERPL-MCNC: 6.1 MG/DL (ref 3.5–7.2)
WBC # BLD AUTO: 11.6 10E9/L (ref 4–11)

## 2018-07-26 PROCEDURE — 85027 COMPLETE CBC AUTOMATED: CPT | Performed by: FAMILY MEDICINE

## 2018-07-26 PROCEDURE — 99214 OFFICE O/P EST MOD 30 MIN: CPT | Performed by: FAMILY MEDICINE

## 2018-07-26 PROCEDURE — 84550 ASSAY OF BLOOD/URIC ACID: CPT | Performed by: FAMILY MEDICINE

## 2018-07-26 PROCEDURE — 36415 COLL VENOUS BLD VENIPUNCTURE: CPT | Performed by: FAMILY MEDICINE

## 2018-07-26 PROCEDURE — 80061 LIPID PANEL: CPT | Performed by: FAMILY MEDICINE

## 2018-07-26 PROCEDURE — 80053 COMPREHEN METABOLIC PANEL: CPT | Performed by: FAMILY MEDICINE

## 2018-07-26 PROCEDURE — 85004 AUTOMATED DIFF WBC COUNT: CPT | Performed by: FAMILY MEDICINE

## 2018-07-26 RX ORDER — ATENOLOL 50 MG/1
50 TABLET ORAL DAILY
Qty: 30 TABLET | Refills: 11 | Status: SHIPPED | OUTPATIENT
Start: 2018-07-26 | End: 2019-01-30

## 2018-07-26 RX ORDER — LISINOPRIL 20 MG/1
20 TABLET ORAL DAILY
Qty: 30 TABLET | Refills: 11 | Status: SHIPPED | OUTPATIENT
Start: 2018-07-26 | End: 2019-01-30

## 2018-07-26 RX ORDER — INDOMETHACIN 25 MG/1
25-50 CAPSULE ORAL
Qty: 30 CAPSULE | Refills: 3 | Status: CANCELLED | OUTPATIENT
Start: 2018-07-26

## 2018-07-26 RX ORDER — ALLOPURINOL 300 MG/1
300 TABLET ORAL DAILY
Qty: 30 TABLET | Refills: 11 | Status: SHIPPED | OUTPATIENT
Start: 2018-07-26 | End: 2019-01-30

## 2018-07-26 ASSESSMENT — PAIN SCALES - GENERAL: PAINLEVEL: NO PAIN (0)

## 2018-07-26 NOTE — PATIENT INSTRUCTIONS
ASSESSMENT:   (I10) Hypertension goal BP (blood pressure) < 140/90  (primary encounter diagnosis)  Comment: doing OK  Plan: atenolol (TENORMIN) 50 MG tablet, lisinopril         (PRINIVIL/ZESTRIL) 20 MG tablet, Comprehensive         metabolic panel (BMP + Alb, Alk Phos, ALT, AST,        Total. Bili, TP)        No change in current treatment plan.  REfills for a year.   Monitor BP periodically.  This can be done at home, in clinic, at our pharmacy, at a store or by neighbor or relative with a blood pressure cuff.  You should be sitting and relaxed for several minutes when taking blood pressure.   Goal BP (most readings) should be less than 140/90    Normal blood pressure is 120/80.    If your blood pressure is consistently at or above the goal, some changes should be made with lifestyle or medication to keep blood pressure under good control.    High blood pressure over time can lead to eye and kidney damage and increase risk for heart attacks and strokes.   Let us know if readings are often high, so we can help get your blood pressure under good control.      (E79.0) Hyperuricemia  Comment: no gout in the past year  Plan: allopurinol (ZYLOPRIM) 300 MG tablet, CBC with         platelets, Uric acid        Continue the allopurinol daily.   Check blood counts today.     (K21.9) Gastroesophageal reflux disease without esophagitis  Comment:   Plan: omeprazole (PRILOSEC) 20 MG CR capsule          Gastroesophageal reflux (GERD) lifestyle changes that can help improve symptoms include:  Eating smaller meals and not lying down after meals  Elevate head of bed with 6-8 inch blocks or a wedge pillow.   Avoid lying flat on back after eating and at bedtime  Avoid tight fitting clothing  Avoid reflux inducing foods like fat, caffeine, chocolate, carbonated beverages  Maintain an ideal body weight  Avoid alcohol and tobacco  Avoid medications like ibuprofen, naproxen and aspirin    You can continue the omeprazole daily as needed for  the heartburn symptoms.     (Z13.6) CARDIOVASCULAR SCREENING; LDL GOAL LESS THAN 130  Comment:   Plan: Lipid panel reflex to direct LDL Non-fasting        Non-fasting blood tests today.     Consider cutting back on alcohol which is likely contributing to weight and the heartburn.    4 beers has about 760 calories and a pound of fat=3500 calories.

## 2018-07-26 NOTE — PROGRESS NOTES
"Please call.   Glucose is high but he was not fasting.  Sometimes this can be a sign of diabetes mellitus.  Also two liver tests were abnormal suggesting inflammation going on in his liver.  This may be from alcohol use although there could be other causes.    Uric acid a little above goal of <6 but he has had no gout episodes.    Triglycerides, a type of fat found in the bloodstream is high, HDL (\"good cholesterol\") is low.  LDL is oK.    WBC mildly increased.  Other blood counts oK.   PLAN: I am concerned alcohol use is affecting his liver.  He is at risk for diabetes mellitus.   I recommend stopping all alcohol for 2-4 weeks and doing fasting blood test (8 hours) for glucose, Hgb A1C and repeat hepatic profile.   This will tell us if he has diabetes mellitus and if liver irritation if from alcohol.  If continuing liver test abnormalities, we will need to do ultrasound of liver and check additional blood tests for other causes of liver disease.   Please arrange for orders."

## 2018-07-26 NOTE — PROGRESS NOTES
SUBJECTIVE:   Bon Perkins is a 42 year old male who presents to clinic today for the following health issues:  Chief Complaint   Patient presents with     Hypertension    Last clinic visit: 7/21/2017 for hypertension and gout.     Hypertension Follow-up      Outpatient blood pressures are not being checked.    Low Salt Diet: no added salt    Taking atenolol 50mg daily and lisinopril 20mg daily.  Takes on a regular basis.       Amount of exercise or physical activity: None    Problems taking medications regularly: No    Medication side effects: none    Diet: regular (no restrictions)    BP Readings from Last 3 Encounters:   07/26/18 150/88   09/21/17 132/76   08/16/17 144/90     No gout spells in the past year.     Patient Active Problem List   Diagnosis     ESOPHAGEAL REFLUX     Arthropathy     Gout     CARDIOVASCULAR SCREENING; LDL GOAL LESS THAN 130     Hyperuricemia     Hypertension goal BP (blood pressure) < 140/90     Preseptal cellulitis      Alcohol.  2-4 beers daily on a weekday.  12 pack on a weekend day.     ROS:General: No change in weight, sleep or appetite.  Normal energy.  No fever or chills  Resp: No coughing, wheezing or shortness of breath  CV: No chest pains or palpitations  GI: POSITIVE for:, heartburn or reflux.  Takes omeprazole daily.  Tried 6 months off the medication and had reflux problems.  Restarted 3 weeks ago.   : No urinary frequency or dysuria, bladder or kidney problems  Musculoskeletal: No significant muscle or joint pains  Psychiatric: No problems with anxiety, depression or mental health    OBJECTIVE:Blood pressure 132/84, pulse 76, temperature 98  F (36.7  C), temperature source Tympanic, resp. rate 14, weight 291 lb (132 kg), SpO2 99 %. BMI=Body mass index is 37.36 kg/(m^2).  GENERAL APPEARANCE ADULT: Alert, no acute distress, obese  NECK: No adenopathy,masses or thyromegaly  RESP: lungs clear to auscultation   CV: normal rate, regular rhythm, no murmur or gallop  ABDOMEN:  soft, no organomegaly, masses or tenderness  MS: extremities normal, no peripheral edema     ASSESSMENT:   (I10) Hypertension goal BP (blood pressure) < 140/90  (primary encounter diagnosis)  Comment: doing OK  Plan: atenolol (TENORMIN) 50 MG tablet, lisinopril         (PRINIVIL/ZESTRIL) 20 MG tablet, Comprehensive         metabolic panel (BMP + Alb, Alk Phos, ALT, AST,        Total. Bili, TP)        No change in current treatment plan.  REfills for a year.   Monitor BP periodically.  This can be done at home, in clinic, at our pharmacy, at a store or by neighbor or relative with a blood pressure cuff.  You should be sitting and relaxed for several minutes when taking blood pressure.   Goal BP (most readings) should be less than 140/90    Normal blood pressure is 120/80.    If your blood pressure is consistently at or above the goal, some changes should be made with lifestyle or medication to keep blood pressure under good control.    High blood pressure over time can lead to eye and kidney damage and increase risk for heart attacks and strokes.   Let us know if readings are often high, so we can help get your blood pressure under good control.      (E79.0) Hyperuricemia  Comment: no gout in the past year  Plan: allopurinol (ZYLOPRIM) 300 MG tablet, CBC with         platelets, Uric acid        Continue the allopurinol daily.   Check blood counts today.     (K21.9) Gastroesophageal reflux disease without esophagitis  Comment:   Plan: omeprazole (PRILOSEC) 20 MG CR capsule          Gastroesophageal reflux (GERD) lifestyle changes that can help improve symptoms include:  Eating smaller meals and not lying down after meals  Elevate head of bed with 6-8 inch blocks or a wedge pillow.   Avoid lying flat on back after eating and at bedtime  Avoid tight fitting clothing  Avoid reflux inducing foods like fat, caffeine, chocolate, carbonated beverages  Maintain an ideal body weight  Avoid alcohol and tobacco  Avoid  medications like ibuprofen, naproxen and aspirin    You can continue the omeprazole daily as needed for the heartburn symptoms.     (Z13.6) CARDIOVASCULAR SCREENING; LDL GOAL LESS THAN 130  Comment:   Plan: Lipid panel reflex to direct LDL Non-fasting        Non-fasting blood tests today.     Consider cutting back on alcohol which is likely contributing to weight and the heartburn.    4 beers has about 760 calories and a pound of fat=3500 calories.

## 2018-07-26 NOTE — MR AVS SNAPSHOT
After Visit Summary   7/26/2018    Bon Perkins    MRN: [id number]           Patient Information     Date Of Birth          1975        Visit Information        Provider Department      7/26/2018 10:00 AM Justin Acosta MD [facility]        Today's Diagnoses     Hypertension goal BP (blood pressure) < 140/90    -  1    Hyperuricemia        Gastroesophageal reflux disease without esophagitis        CARDIOVASCULAR SCREENING; LDL GOAL LESS THAN 130          Care Instructions    ASSESSMENT:   (I10) Hypertension goal BP (blood pressure) < 140/90  (primary encounter diagnosis)  Comment: doing OK  Plan: atenolol (TENORMIN) 50 MG tablet, lisinopril         (PRINIVIL/ZESTRIL) 20 MG tablet, Comprehensive         metabolic panel (BMP + Alb, Alk Phos, ALT, AST,        Total. Bili, TP)        No change in current treatment plan.  REfills for a year.   Monitor BP periodically.  This can be done at home, in clinic, at our pharmacy, at a store or by neighbor or relative with a blood pressure cuff.  You should be sitting and relaxed for several minutes when taking blood pressure.   Goal BP (most readings) should be less than 140/90    Normal blood pressure is 120/80.    If your blood pressure is consistently at or above the goal, some changes should be made with lifestyle or medication to keep blood pressure under good control.    High blood pressure over time can lead to eye and kidney damage and increase risk for heart attacks and strokes.   Let us know if readings are often high, so we can help get your blood pressure under good control.      (E79.0) Hyperuricemia  Comment: no gout in the past year  Plan: allopurinol (ZYLOPRIM) 300 MG tablet, CBC with         platelets, Uric acid        Continue the allopurinol daily.   Check blood counts today.     (K21.9) Gastroesophageal reflux disease without esophagitis  Comment:   Plan: omeprazole (PRILOSEC) 20 MG CR capsule          Gastroesophageal  reflux (GERD) lifestyle changes that can help improve symptoms include:  Eating smaller meals and not lying down after meals  Elevate head of bed with 6-8 inch blocks or a wedge pillow.   Avoid lying flat on back after eating and at bedtime  Avoid tight fitting clothing  Avoid reflux inducing foods like fat, caffeine, chocolate, carbonated beverages  Maintain an ideal body weight  Avoid alcohol and tobacco  Avoid medications like ibuprofen, naproxen and aspirin    You can continue the omeprazole daily as needed for the heartburn symptoms.     (Z13.6) CARDIOVASCULAR SCREENING; LDL GOAL LESS THAN 130  Comment:   Plan: Lipid panel reflex to direct LDL Non-fasting        Non-fasting blood tests today.     Consider cutting back on alcohol which is likely contributing to weight and the heartburn.    4 beers has about 760 calories and a pound of fat=3500 calories.           Follow-ups after your visit        Who to contact     If you have questions or need follow up information about today's clinic visit or your schedule please contact [facility] directly at [phone].  Normal or non-critical lab and imaging results will be communicated to you by MyChart, letter or phone within 4 business days after the clinic has received the results. If you do not hear from us within 7 days, please contact the clinic through [employer] or phone. If you have a critical or abnormal lab result, we will notify you by phone as soon as possible.  Submit refill requests through [email or web address] or call your pharmacy and they will forward the refill request to us. Please allow 3 business days for your refill to be completed.          Additional Information About Your Visit        Care EveryWhere ID     This is your Care EveryWhere ID. This could be used by other organizations to access your [facility] medical records  [id number]        Your Vitals Were     Pulse Temperature Respirations Pulse Oximetry BMI (Body Mass Index)       76 98   F (36.7  C) (Tympanic) 14 99% 37.36 kg/m2        Blood Pressure from Last 3 Encounters:   07/26/18 132/84   09/21/17 132/76   08/16/17 144/90    Weight from Last 3 Encounters:   07/26/18 291 lb (132 kg)   07/23/17 291 lb 3.2 oz (132.1 kg)   07/21/17 287 lb (130.2 kg)              We Performed the Following     CBC with platelets     Comprehensive metabolic panel (BMP + Alb, Alk Phos, ALT, AST, Total. Bili, TP)     Lipid panel reflex to direct LDL Non-fasting     Uric acid          Today's Medication Changes          These changes are accurate as of 7/26/18 10:53 AM.  If you have any questions, ask your nurse or doctor.               Start taking these medicines.        Dose/Directions    omeprazole 20 MG CR capsule   Commonly known as:  priLOSEC   Used for:  Gastroesophageal reflux disease without esophagitis   Started by:  Justin Acosta MD        Dose:  20 mg   Take 1 capsule (20 mg) by mouth daily   Quantity:  30 capsule   Refills:  11         These medicines have changed or have updated prescriptions.        Dose/Directions    lisinopril 20 MG tablet   Commonly known as:  PRINIVIL/ZESTRIL   This may have changed:  See the new instructions.   Used for:  Hypertension goal BP (blood pressure) < 140/90   Changed by:  Justin Acosta MD        Dose:  20 mg   Take 1 tablet (20 mg) by mouth daily   Quantity:  30 tablet   Refills:  11            Where to get your medicines      These medications were sent to [facility] [id number]     Phone:  [phone]     allopurinol 300 MG tablet    atenolol 50 MG tablet    lisinopril 20 MG tablet    omeprazole 20 MG CR capsule                Primary Care Provider Office Phone # Fax #    Justin Acosta [phone]       [address] [place] [id number]        Equal Access to Services     NEVILLE QUIROGA AH: luis Koch, christian  myra mckeonemily rees ah. So [facility] [phone].    ATENCIÓN: Si luis alfredo lagos, tiene a mayberry disposición servicios gratuitos de asistencia lingüística. Kip al [phone].    We comply with applicable federal civil rights laws and [place] laws. We do not discriminate on the basis of race, color, national origin, age, disability, sex, sexual orientation, or gender identity.            Thank you!     Thank you for choosing [facility]  for your care. Our goal is always to provide you with excellent care. Hearing back from our patients is one way we can continue to improve our services. Please take a few minutes to complete the written survey that you may receive in the mail after your visit with us. Thank you!             Your Updated Medication List - Protect others around you: Learn how to safely use, store and throw away your medicines at www.disposemymeds.org.          This list is accurate as of 7/26/18 10:53 AM.  Always use your most recent med list.                   Brand Name Dispense Instructions for use Diagnosis    allopurinol 300 MG tablet    ZYLOPRIM    30 tablet    Take 1 tablet (300 mg) by mouth daily    Hyperuricemia       atenolol 50 MG tablet    TENORMIN    30 tablet    Take 1 tablet (50 mg) by mouth daily    Hypertension goal BP (blood pressure) < 140/90       indomethacin 25 MG capsule    INDOCIN    30 capsule    Take 1-2 capsules (25-50 mg) by mouth 3 times daily (with meals)    Hyperuricemia       lisinopril 20 MG tablet    PRINIVIL/ZESTRIL    30 tablet    Take 1 tablet (20 mg) by mouth daily    Hypertension goal BP (blood pressure) < 140/90       omeprazole 20 MG CR capsule    priLOSEC    30 capsule    Take 1 capsule (20 mg) by mouth daily    Gastroesophageal reflux disease without esophagitis

## 2018-07-27 DIAGNOSIS — R73.9 BLOOD GLUCOSE ELEVATED: ICD-10-CM

## 2018-07-27 DIAGNOSIS — R74.8 ELEVATED LIVER ENZYMES: Primary | ICD-10-CM

## 2018-10-11 ENCOUNTER — TELEPHONE (OUTPATIENT)
Dept: FAMILY MEDICINE | Facility: CLINIC | Age: 43
End: 2018-10-11

## 2018-10-11 NOTE — TELEPHONE ENCOUNTER
Patient was told to return for fasting labs, reminder letter was sent to patient on 09/06/2018, patient has still not scheduled an appointment.

## 2019-01-29 DIAGNOSIS — I10 HYPERTENSION GOAL BP (BLOOD PRESSURE) < 140/90: ICD-10-CM

## 2019-01-30 DIAGNOSIS — K21.9 GASTROESOPHAGEAL REFLUX DISEASE WITHOUT ESOPHAGITIS: ICD-10-CM

## 2019-01-30 DIAGNOSIS — I10 HYPERTENSION GOAL BP (BLOOD PRESSURE) < 140/90: ICD-10-CM

## 2019-01-30 DIAGNOSIS — E79.0 HYPERURICEMIA: ICD-10-CM

## 2019-01-30 RX ORDER — ALLOPURINOL 300 MG/1
300 TABLET ORAL DAILY
Qty: 30 TABLET | Refills: 11 | Status: SHIPPED | OUTPATIENT
Start: 2019-01-30 | End: 2019-11-25

## 2019-01-30 RX ORDER — LISINOPRIL 20 MG/1
20 TABLET ORAL DAILY
Qty: 30 TABLET | Refills: 11 | Status: SHIPPED | OUTPATIENT
Start: 2019-01-30 | End: 2019-11-25

## 2019-01-30 RX ORDER — ATENOLOL 50 MG/1
50 TABLET ORAL DAILY
Qty: 30 TABLET | Refills: 11 | Status: SHIPPED | OUTPATIENT
Start: 2019-01-30 | End: 2019-11-25

## 2019-01-31 DIAGNOSIS — K21.9 GASTROESOPHAGEAL REFLUX DISEASE WITHOUT ESOPHAGITIS: ICD-10-CM

## 2019-11-25 ENCOUNTER — OFFICE VISIT (OUTPATIENT)
Dept: FAMILY MEDICINE | Facility: CLINIC | Age: 44
End: 2019-11-25
Payer: COMMERCIAL

## 2019-11-25 VITALS
DIASTOLIC BLOOD PRESSURE: 86 MMHG | BODY MASS INDEX: 34.91 KG/M2 | SYSTOLIC BLOOD PRESSURE: 150 MMHG | WEIGHT: 272 LBS | TEMPERATURE: 99.1 F | HEIGHT: 74 IN | OXYGEN SATURATION: 97 % | RESPIRATION RATE: 16 BRPM | HEART RATE: 87 BPM

## 2019-11-25 DIAGNOSIS — F41.9 ANXIETY: ICD-10-CM

## 2019-11-25 DIAGNOSIS — E79.0 HYPERURICEMIA: ICD-10-CM

## 2019-11-25 DIAGNOSIS — R73.09 ELEVATED GLUCOSE: ICD-10-CM

## 2019-11-25 DIAGNOSIS — I10 ESSENTIAL HYPERTENSION WITH GOAL BLOOD PRESSURE LESS THAN 140/90: Primary | ICD-10-CM

## 2019-11-25 DIAGNOSIS — E66.01 CLASS 2 SEVERE OBESITY WITH SERIOUS COMORBIDITY AND BODY MASS INDEX (BMI) OF 35.0 TO 35.9 IN ADULT, UNSPECIFIED OBESITY TYPE (H): ICD-10-CM

## 2019-11-25 DIAGNOSIS — E66.812 CLASS 2 SEVERE OBESITY WITH SERIOUS COMORBIDITY AND BODY MASS INDEX (BMI) OF 35.0 TO 35.9 IN ADULT, UNSPECIFIED OBESITY TYPE (H): ICD-10-CM

## 2019-11-25 DIAGNOSIS — K21.9 GASTROESOPHAGEAL REFLUX DISEASE WITHOUT ESOPHAGITIS: ICD-10-CM

## 2019-11-25 LAB
ALBUMIN SERPL-MCNC: 4 G/DL (ref 3.4–5)
ALP SERPL-CCNC: 99 U/L (ref 40–150)
ALT SERPL W P-5'-P-CCNC: 99 U/L (ref 0–70)
ANION GAP SERPL CALCULATED.3IONS-SCNC: 6 MMOL/L (ref 3–14)
AST SERPL W P-5'-P-CCNC: 58 U/L (ref 0–45)
BILIRUB SERPL-MCNC: 0.6 MG/DL (ref 0.2–1.3)
BUN SERPL-MCNC: 16 MG/DL (ref 7–30)
CALCIUM SERPL-MCNC: 9.1 MG/DL (ref 8.5–10.1)
CHLORIDE SERPL-SCNC: 101 MMOL/L (ref 94–109)
CO2 SERPL-SCNC: 26 MMOL/L (ref 20–32)
CREAT SERPL-MCNC: 0.65 MG/DL (ref 0.66–1.25)
ERYTHROCYTE [DISTWIDTH] IN BLOOD BY AUTOMATED COUNT: 12.5 % (ref 10–15)
GFR SERPL CREATININE-BSD FRML MDRD: >90 ML/MIN/{1.73_M2}
GLUCOSE SERPL-MCNC: 316 MG/DL (ref 70–99)
HBA1C MFR BLD: 11.2 % (ref 0–5.6)
HCT VFR BLD AUTO: 46.9 % (ref 40–53)
HGB BLD-MCNC: 15.5 G/DL (ref 13.3–17.7)
MCH RBC QN AUTO: 29.3 PG (ref 26.5–33)
MCHC RBC AUTO-ENTMCNC: 33 G/DL (ref 31.5–36.5)
MCV RBC AUTO: 89 FL (ref 78–100)
PLATELET # BLD AUTO: 156 10E9/L (ref 150–450)
POTASSIUM SERPL-SCNC: 4 MMOL/L (ref 3.4–5.3)
PROT SERPL-MCNC: 8.1 G/DL (ref 6.8–8.8)
RBC # BLD AUTO: 5.29 10E12/L (ref 4.4–5.9)
SODIUM SERPL-SCNC: 133 MMOL/L (ref 133–144)
URATE SERPL-MCNC: 4.4 MG/DL (ref 3.5–7.2)
WBC # BLD AUTO: 11.3 10E9/L (ref 4–11)

## 2019-11-25 PROCEDURE — 99214 OFFICE O/P EST MOD 30 MIN: CPT | Performed by: FAMILY MEDICINE

## 2019-11-25 PROCEDURE — 84550 ASSAY OF BLOOD/URIC ACID: CPT | Performed by: FAMILY MEDICINE

## 2019-11-25 PROCEDURE — 85027 COMPLETE CBC AUTOMATED: CPT | Performed by: FAMILY MEDICINE

## 2019-11-25 PROCEDURE — 83036 HEMOGLOBIN GLYCOSYLATED A1C: CPT | Performed by: FAMILY MEDICINE

## 2019-11-25 PROCEDURE — 80053 COMPREHEN METABOLIC PANEL: CPT | Performed by: FAMILY MEDICINE

## 2019-11-25 PROCEDURE — 36415 COLL VENOUS BLD VENIPUNCTURE: CPT | Performed by: FAMILY MEDICINE

## 2019-11-25 RX ORDER — ALLOPURINOL 300 MG/1
300 TABLET ORAL DAILY
Qty: 90 TABLET | Refills: 3 | Status: SHIPPED | OUTPATIENT
Start: 2019-11-25 | End: 2020-08-26

## 2019-11-25 RX ORDER — LISINOPRIL 30 MG/1
30 TABLET ORAL DAILY
Qty: 90 TABLET | Refills: 3 | Status: SHIPPED | OUTPATIENT
Start: 2019-11-25 | End: 2020-08-26

## 2019-11-25 RX ORDER — ATENOLOL 50 MG/1
50 TABLET ORAL DAILY
Qty: 90 TABLET | Refills: 3 | Status: SHIPPED | OUTPATIENT
Start: 2019-11-25 | End: 2020-08-26

## 2019-11-25 ASSESSMENT — MIFFLIN-ST. JEOR: SCORE: 2192.53

## 2019-11-26 ENCOUNTER — TELEPHONE (OUTPATIENT)
Dept: FAMILY MEDICINE | Facility: CLINIC | Age: 44
End: 2019-11-26

## 2019-11-26 DIAGNOSIS — R73.09 ELEVATED GLUCOSE: Primary | ICD-10-CM

## 2019-11-26 RX ORDER — LANCETS
EACH MISCELLANEOUS
Qty: 60 EACH | Refills: 6 | Status: SHIPPED | OUTPATIENT
Start: 2019-11-26 | End: 2023-12-22

## 2019-11-26 NOTE — RESULT ENCOUNTER NOTE
Please call.  He has diabetes mellitus.  Glucose is very high.  Hgb A1C test for diabetes mellitus indicates poorly controlled diabetes mellitus with chronically high blood glucose.    Two liver tests also remain abnormal.   PLAN: Schedule an appointment with clinic RN to get glucometer, test strips and supplies to start checking glucometers.  Check a couple times a day, record results.   Schedule an appointment with diabetic educator to learn about diabetes mellitus.   Start metformin at 500mg twice daily.   See me within a couple weeks of checking glucometers and bring in meter and readings.   Stop all alcohol.  This is making diabetes mellitus worse and causing liver inflammation which can lead to permanent, serious liver damage.  We need to recheck liver tests after being off alcohol to see if the inflammation improves.   Please arrange for prescriptions and orders.  prescription metformin 500mg #60 with 11 refills.    diabetic educator referral.   prescription for glucometer and supplies.   TANYA ANDRADE MD

## 2019-11-26 NOTE — TELEPHONE ENCOUNTER
Spoke with patient regarding results.   Understanding voiced.   Appointments scheduled.   Please sign medications.   See Dr. Acosta's note below  Regina Adriano, WILLA      Please call.  He has diabetes mellitus.  Glucose is very high.  Hgb A1C test for diabetes mellitus indicates poorly controlled diabetes mellitus with chronically high blood glucose.     Two liver tests also remain abnormal.   PLAN: Schedule an appointment with clinic RN to get glucometer, test strips and supplies to start checking glucometers.  Check a couple times a day, record results.   Schedule an appointment with diabetic educator to learn about diabetes mellitus.   Start metformin at 500mg twice daily.   See me within a couple weeks of checking glucometers and bring in meter and readings.   Stop all alcohol.  This is making diabetes mellitus worse and causing liver inflammation which can lead to permanent, serious liver damage.  We need to recheck liver tests after being off alcohol to see if the inflammation improves.   Please arrange for prescriptions and orders.  prescription metformin 500mg #60 with 11 refills.     diabetic educator referral.   prescription for glucometer and supplies.   TANYA ACOSTA MD

## 2019-11-26 NOTE — PATIENT INSTRUCTIONS
ASSESSMENT:   (I10) Essential hypertension with goal blood pressure less than 140/90  (primary encounter diagnosis)  Comment: high today  Plan: atenolol (TENORMIN) 50 MG tablet, lisinopril         (PRINIVIL/ZESTRIL) 30 MG tablet, Comprehensive         metabolic panel (BMP + Alb, Alk Phos, ALT, AST,        Total. Bili, TP), CBC with platelets        Increase lisinopril to 30mg daily.    Continue atenolol at 50mg daily.   Keep working on weight.      (E79.0) Hyperuricemia  Comment: no gout  Plan: allopurinol (ZYLOPRIM) 300 MG tablet, Uric acid        REfills..  No change in current treatment plan.     (K21.9) Gastroesophageal reflux disease without esophagitis  Comment: stable  Plan: omeprazole (PRILOSEC) 20 MG DR capsule        cranial nerves  Refills.     (R73.09) Elevated glucose  Comment: at risk for diabetes mellitus.   Plan: Hemoglobin A1c        Check blood tests for diabetes mellitus     (E66.01,  Z68.35) Class 2 severe obesity with serious comorbidity and body mass index (BMI) of 35.0 to 35.9 in adult, unspecified obesity type (H)  Comment: some weight loss.     (F41.9) Anxiety  Comment:   Plan: sertraline (ZOLOFT) 50 MG tablet        I discussed options for medication for anxiety and panic attacks.  Another option would be counseling, help with managing stress and possibly learning relaxation exercises.  Regular exercise could also be helpful.  SSRI medications work well for longer term use to prevent anxiety and panic attacks.  Reviewed potential side effects.     Start sertraline 50mg daily.   If intolerable side effects or additional problems, notify me.    Recheck in a month to reevaluate, call earlier with significant side effects.

## 2019-11-27 ENCOUNTER — ALLIED HEALTH/NURSE VISIT (OUTPATIENT)
Dept: EDUCATION SERVICES | Facility: CLINIC | Age: 44
End: 2019-11-27
Payer: COMMERCIAL

## 2019-11-27 ENCOUNTER — ALLIED HEALTH/NURSE VISIT (OUTPATIENT)
Dept: FAMILY MEDICINE | Facility: CLINIC | Age: 44
End: 2019-11-27
Payer: COMMERCIAL

## 2019-11-27 DIAGNOSIS — E11.9 DIABETES MELLITUS, TYPE 2 (H): Primary | ICD-10-CM

## 2019-11-27 DIAGNOSIS — R73.09 ELEVATED GLUCOSE: Primary | ICD-10-CM

## 2019-11-27 PROCEDURE — 99207 ZZC NO CHARGE NURSE ONLY: CPT

## 2019-11-27 PROCEDURE — G0108 DIAB MANAGE TRN  PER INDIV: HCPCS | Performed by: DIETITIAN, REGISTERED

## 2019-11-27 RX ORDER — FLASH GLUCOSE SENSOR
1 KIT MISCELLANEOUS
Qty: 2 EACH | Refills: 11 | Status: SHIPPED | OUTPATIENT
Start: 2019-11-27 | End: 2020-01-24

## 2019-11-27 RX ORDER — FLASH GLUCOSE SENSOR
1 KIT MISCELLANEOUS
Qty: 2 EACH | Refills: 11 | Status: CANCELLED | OUTPATIENT
Start: 2019-11-27

## 2019-11-27 NOTE — PROGRESS NOTES
"Diabetes Self-Management Education & Support    Diabetes Education Self Management & Training    SUBJECTIVE/OBJECTIVE:  Presents for: Initial Assessment for new diagnosis  Accompanied by: Spouse  Diabetes education in the past 24mo: No  Focus of Visit: Healthy Eating, Taking Medication, Problem Solving  Diabetes type: Type 2  Disease course: Getting harder to manage  How confident are you filling out medical forms by yourself:: Not Assessed  Transportation concerns: No  Other concerns:: None  Cultural Influences/Ethnic Background:  American      Diabetes Symptoms & Complications  Blurred vision: Yes  Fatigue: No  Neuropathy: Yes  Polyuria: Yes  Visual change: Yes  Weakness: Yes       Patient Problem List and Family Medical History reviewed for relevant medical history, current medical status, and diabetes risk factors.    Vitals:  There were no vitals taken for this visit.  Estimated body mass index is 35.28 kg/m  as calculated from the following:    Height as of 11/25/19: 1.87 m (6' 1.62\").    Weight as of 11/25/19: 123.4 kg (272 lb).   Last 3 BP:   BP Readings from Last 3 Encounters:   11/25/19 (!) 150/86   07/26/18 132/84   09/21/17 132/76       History   Smoking Status     Never Smoker   Smokeless Tobacco     Former User     Types: Chew     Comment: quit in July 2006       Labs:  Lab Results   Component Value Date    A1C 11.2 11/25/2019     Lab Results   Component Value Date     11/25/2019     Lab Results   Component Value Date    LDL 97 07/26/2018     HDL Cholesterol   Date Value Ref Range Status   07/26/2018 37 (L) >39 mg/dL Final   ]  GFR Estimate   Date Value Ref Range Status   11/25/2019 >90 >60 mL/min/[1.73_m2] Final     Comment:     Non  GFR Calc  Starting 12/18/2018, serum creatinine based estimated GFR (eGFR) will be   calculated using the Chronic Kidney Disease Epidemiology Collaboration   (CKD-EPI) equation.       GFR Estimate If Black   Date Value Ref Range Status   11/25/2019 " >90 >60 mL/min/[1.73_m2] Final     Comment:      GFR Calc  Starting 12/18/2018, serum creatinine based estimated GFR (eGFR) will be   calculated using the Chronic Kidney Disease Epidemiology Collaboration   (CKD-EPI) equation.       Lab Results   Component Value Date    CR 0.65 11/25/2019     No results found for: MICROALBUMIN    Healthy Eating  Healthy Eating Assessed Today: Yes  Breakfast: two eggs, two toast, glass of milk and two coffee black, energy drink  Lunch: ham sandwich, pretzel and chips, water  Dinner: chicken wings, french fries, 6 beers or tacos 4 (soft): lettuce, cheese meat and hotsauce, rice on side 1 cup, milk 12 oz  Snacks: banana and granola bar at 9 am or peanuts or 1/2 peanut butter sandwich with milk  Other: 4 before dinner, weekends case of beer  Beverages: Water, Coffee, Energy drinks, Sports drinks, Alcohol  Has patient met with a dietitian in the past?: No    Being Active    and always on the  move    Monitoring     394 BG number    Taking Medications  Diabetes Medication(s)     Biguanides       metFORMIN (GLUCOPHAGE) 500 MG tablet    Take 1 tablet (500 mg) by mouth 2 times daily (with meals)               Problem Solving   not assessed              Reducing Risks   discussed eyes    Healthy Coping     Patient Activation Measure Survey Score:  ORLNI Score (Last Two) 2/1/2011   ORLIN Raw Score 51   Activation Score 91.6   ORLIN Level 4       ASSESSMENT:  Went over pathophysiology of diabetes, meal planning, ETOH, Sexual dysfunction, eye sight issues, placed pt on a juan sensor.        Patient's most recent   Lab Results   Component Value Date    A1C 11.2 11/25/2019    is not meeting goal of <7.0    INTERVENTION:   Diabetes knowledge and skills assessment:     Patient is knowledgeable in diabetes management concepts related to: Healthy Eating, Being Active and Monitoring    Patient needs further education on the following diabetes management concepts: Healthy Eating,  Being Active, Monitoring, Taking Medication, Problem Solving, Reducing Risks and Healthy Coping    Based on learning assessment above, most appropriate setting for further diabetes education would be: Individual setting.    Education provided today on:  AADE Self-Care Behaviors:  Diabetes Pathophysiology  Healthy Eating: carbohydrate counting, consistency in amount, composition, and timing of food intake and label reading  Being Active: relationship to blood glucose and describe appropriate activity program  Monitoring: purpose, proper technique, log and interpret results, individual blood glucose targets, frequency of monitoring and use of sensor as well  Taking Medication: action of prescribed medication, side effects of prescribed medications and when to take medications    Opportunities for ongoing education and support in diabetes-self management were discussed.    Pt verbalized understanding of concepts discussed and recommendations provided today.       Education Materials Provided:  Samm Taking Charge of Your Diabetes Book, BG Log Sheet, Carbohydrate Counting and Medication Information on metformin    PLAN:  See Patient Instructions for co-developed, patient-stated behavior change goals.  AVS printed and provided to patient today. See Follow-Up section for recommended follow-up.      Time Spent: 60 minutes  Encounter Type: Individual    Any diabetes medication dose changes were made via the CDE Protocol and Collaborative Practice Agreement with the patient's primary care provider. A copy of this encounter was shared with the provider.

## 2019-11-27 NOTE — PATIENT INSTRUCTIONS
1.   Watch carbohydrate choices 4-5 at meals, 1-2 at snacks      2.  Stay active, get another eliptical and use it      3.  Test blood sugars twice daily  In am and before supper.  Goal is .

## 2019-12-19 ENCOUNTER — OFFICE VISIT (OUTPATIENT)
Dept: FAMILY MEDICINE | Facility: CLINIC | Age: 44
End: 2019-12-19
Payer: COMMERCIAL

## 2019-12-19 VITALS
WEIGHT: 267 LBS | SYSTOLIC BLOOD PRESSURE: 136 MMHG | TEMPERATURE: 99.9 F | HEART RATE: 72 BPM | HEIGHT: 74 IN | BODY MASS INDEX: 34.27 KG/M2 | DIASTOLIC BLOOD PRESSURE: 86 MMHG | RESPIRATION RATE: 18 BRPM

## 2019-12-19 DIAGNOSIS — M1A.09X0 CHRONIC GOUT OF MULTIPLE SITES, UNSPECIFIED CAUSE: ICD-10-CM

## 2019-12-19 DIAGNOSIS — I10 ESSENTIAL HYPERTENSION WITH GOAL BLOOD PRESSURE LESS THAN 140/90: ICD-10-CM

## 2019-12-19 DIAGNOSIS — E11.9 TYPE 2 DIABETES MELLITUS WITHOUT COMPLICATION, WITHOUT LONG-TERM CURRENT USE OF INSULIN (H): Primary | ICD-10-CM

## 2019-12-19 DIAGNOSIS — F41.9 ANXIETY: ICD-10-CM

## 2019-12-19 DIAGNOSIS — E66.812 CLASS 2 SEVERE OBESITY WITH SERIOUS COMORBIDITY AND BODY MASS INDEX (BMI) OF 35.0 TO 35.9 IN ADULT, UNSPECIFIED OBESITY TYPE (H): ICD-10-CM

## 2019-12-19 DIAGNOSIS — R74.8 ELEVATED LIVER ENZYMES: ICD-10-CM

## 2019-12-19 DIAGNOSIS — E66.01 CLASS 2 SEVERE OBESITY WITH SERIOUS COMORBIDITY AND BODY MASS INDEX (BMI) OF 35.0 TO 35.9 IN ADULT, UNSPECIFIED OBESITY TYPE (H): ICD-10-CM

## 2019-12-19 PROCEDURE — 99214 OFFICE O/P EST MOD 30 MIN: CPT | Performed by: FAMILY MEDICINE

## 2019-12-19 ASSESSMENT — MIFFLIN-ST. JEOR: SCORE: 2169.85

## 2019-12-19 NOTE — PATIENT INSTRUCTIONS
ASSESSMENT:   (E11.9) Type 2 diabetes mellitus without complication, without long-term current use of insulin (H)  (primary encounter diagnosis)  Comment: doing much better.    Plan: metFORMIN (GLUCOPHAGE) 850 MG tablet          Lifestyle recommendations:regular exercise, at least 150 minutes per week (average 30 minutes 5 times a week)  keep working on losing weight (ideal BMI-body mass index is <25)     Recheck in 2 months-fasting for 8 hours with no calories.   Next time you fill the metformin, increase to 850mg twice daily.    Let me know if any side effects.     (R74.8) Elevated liver enzymes  Comment: Probably related to alcohol.   You have cut back significantly.   Plan: Recheck in 2 months with other labs.      (I10) Essential hypertension with goal blood pressure less than 140/90  Comment: doing much better  Plan: No change in current treatment plan.     (M1A.09X0) Chronic gout of multiple sites, unspecified cause  Comment: stable.  Plan: No change in current treatment plan.  Continue the allopurinol.     (E66.01,  Z68.35) Class 2 severe obesity with serious comorbidity and body mass index (BMI) of 35.0 to 35.9 in adult, unspecified obesity type (H)  Comment: working on diet and weight.      (F41.9) Anxiety  Comment: a little better.  Plan: sertraline (ZOLOFT) 50 MG tablet        Continue the sertraline.

## 2019-12-19 NOTE — NURSING NOTE
"Chief Complaint   Patient presents with     Diabetes     New Diagnosis       Initial /86 (BP Location: Right arm, Patient Position: Chair, Cuff Size: Adult Large)   Pulse 72   Temp 99.9  F (37.7  C) (Tympanic)   Resp 18   Ht 1.87 m (6' 1.62\")   Wt 121.1 kg (267 lb)   BMI 34.63 kg/m   Estimated body mass index is 34.63 kg/m  as calculated from the following:    Height as of this encounter: 1.87 m (6' 1.62\").    Weight as of this encounter: 121.1 kg (267 lb).    Patient presents to the clinic using No DME    Health Maintenance that is potentially due pending provider review:  Flu - declined    Eloina Morin MA  10:50 AM 12/19/2019  .      "

## 2019-12-19 NOTE — PROGRESS NOTES
SUBJECTIVE: Bon Perkins is a 44 year old male  Chief Complaint   Patient presents with     Diabetes     New Diagnosis      Last clinic visit: 11/25 for anxiety.  Started sertraline 50mg daily.   His blood pressure was high.  I increased his lisinopril to 30mg daily.   Labs abnormal with Hgb A1C =11.2 glucose was 316  I recommended stopping alcohol, starting metformin 500mg twice daily.   He had elevated liver function tests as well.     Diabetes Follow-up    How often are you checking your blood sugar? Two times daily  He has changed diet.    He stopped alcohol except for one night.  Range has been 109-260 in the past 2 weeks   Time in target=53%, high 47% Target range=    He has been using [employer] continuous monitor.   He has cut back on carbs.  Calories about 1200 per day.  Eating more vegetables.     Blood sugar testing frequency justification:  Adjustment of medication(s)  What time of day are you checking your blood sugars (select all that apply)?  Before meals  Have you had any blood sugars above 200?  Yes when first started with the sensor. Has gotten better  Have you had any blood sugars below 70?  No    What symptoms do you notice when your blood sugar is low?  None    What concerns do you have today about your diabetes? None and Other: would like blood work done     Do you have any of these symptoms? (Select all that apply)  No numbness or tingling in feet.  No redness, sores or blisters on feet.  No complaints of excessive thirst.  No reports of blurry vision.  No significant changes to weight.     Have you had a diabetic eye exam in the last 12 months? No, not yet    BP Readings from Last 2 Encounters:   11/25/19 (!) 150/86   07/26/18 132/84     Hemoglobin A1C (%)   Date Value   11/25/2019 11.2 (H)     LDL Cholesterol Calculated (mg/dL)   Date Value   07/26/2018 97   05/05/2014 141 (H)       Diabetes Management Resources    Weight down 5# in the past 3 weeks.      Wt Readings from Last 5 Encounters:  "  12/19/19 121.1 kg (267 lb)   11/25/19 123.4 kg (272 lb)   07/26/18 132 kg (291 lb)   07/23/17 132.1 kg (291 lb 3.2 oz)   07/21/17 130.2 kg (287 lb)      Anxiety:He notes a little dizziness.  Spouse notes he is a lot calmer.  He can get irritated easily, feels medication is helping some.    HE has not been craving alcohol.     Not checking blood pressure at home.     OBJECTIVE:Blood pressure 136/86, pulse 72, temperature 99.9  F (37.7  C), temperature source Tympanic, resp. rate 18, height 1.87 m (6' 1.62\"), weight 121.1 kg (267 lb). BMI=Body mass index is 34.63 kg/m .  GENERAL APPEARANCE ADULT: Alert, no acute distress  MS: extremities normal, no peripheral edema  foot exam normal DP and PT pulses, no trophic changes or ulcerative lesions and normal monofilament exam    ASSESSMENT:   (E11.9) Type 2 diabetes mellitus without complication, without long-term current use of insulin (H)  (primary encounter diagnosis)  Comment: doing much better.    Plan: metFORMIN (GLUCOPHAGE) 850 MG tablet          Lifestyle recommendations:regular exercise, at least 150 minutes per week (average 30 minutes 5 times a week)  keep working on losing weight (ideal BMI-body mass index is <25)     Recheck in 2 months-fasting for 8 hours with no calories.   Next time you fill the metformin, increase to 850mg twice daily.    Let me know if any side effects.     (R74.8) Elevated liver enzymes  Comment: Probably related to alcohol.   You have cut back significantly.   Plan: Recheck in 2 months with other labs.      (I10) Essential hypertension with goal blood pressure less than 140/90  Comment: doing much better  Plan: No change in current treatment plan.     (M1A.09X0) Chronic gout of multiple sites, unspecified cause  Comment: stable.  Plan: No change in current treatment plan.  Continue the allopurinol.     (E66.01,  Z68.35) Class 2 severe obesity with serious comorbidity and body mass index (BMI) of 35.0 to 35.9 in adult, unspecified " obesity type (H)  Comment: working on diet and weight.      (F41.9) Anxiety  Comment: a little better.  Plan: sertraline (ZOLOFT) 50 MG tablet        Continue the sertraline.         Glucose Log    Time spent during visit=30 minutes over half of it in counseling.

## 2020-01-24 DIAGNOSIS — E11.9 DIABETES MELLITUS, TYPE 2 (H): ICD-10-CM

## 2020-01-24 DIAGNOSIS — F41.9 ANXIETY: ICD-10-CM

## 2020-01-24 RX ORDER — FLASH GLUCOSE SENSOR
1 KIT MISCELLANEOUS
Qty: 6 EACH | Refills: 3 | Status: SHIPPED | OUTPATIENT
Start: 2020-01-24 | End: 2020-02-04

## 2020-02-04 DIAGNOSIS — E11.9 DIABETES MELLITUS, TYPE 2 (H): ICD-10-CM

## 2020-02-04 RX ORDER — FLASH GLUCOSE SENSOR
1 KIT MISCELLANEOUS
Qty: 8 EACH | Refills: 0 | Status: SHIPPED | OUTPATIENT
Start: 2020-02-04 | End: 2020-07-16

## 2020-02-04 NOTE — TELEPHONE ENCOUNTER
Per pharmacy patient needs qty 90 day supply with 4 refills    Requested Prescriptions   Pending Prescriptions Disp Refills     Continuous Blood Gluc Sensor (FREESTYLE WOJCIECH 14 DAY SENSOR) MISC       Si each every 14 days       There is no refill protocol information for this order      Last Written Prescription Date:  20  Last Fill Quantity: 6,  # refills: 3   Last office visit: 2019 with prescribing provider:  19   Future Office Visit:

## 2020-04-22 PROBLEM — E11.9 TYPE 2 DIABETES MELLITUS WITHOUT COMPLICATION, WITHOUT LONG-TERM CURRENT USE OF INSULIN (H): Status: ACTIVE | Noted: 2020-04-22

## 2020-04-30 ENCOUNTER — TELEPHONE (OUTPATIENT)
Dept: FAMILY MEDICINE | Facility: CLINIC | Age: 45
End: 2020-04-30

## 2020-04-30 NOTE — TELEPHONE ENCOUNTER
Panel Management Review      Patient has the following on his problem list:     Diabetes    ASA: Failed    Last A1C  Lab Results   Component Value Date    A1C 11.2 11/25/2019     A1C tested: FAILED    Last LDL:    Lab Results   Component Value Date    CHOL 184 07/26/2018     Lab Results   Component Value Date    HDL 37 07/26/2018     Lab Results   Component Value Date    LDL 97 07/26/2018     Lab Results   Component Value Date    TRIG 251 07/26/2018     Lab Results   Component Value Date    CHOLHDLRATIO 6.0 05/05/2014     Lab Results   Component Value Date    NHDL 147 07/26/2018       Is the patient on a Statin? NO             Is the patient on Aspirin? NO        Last three blood pressure readings:  BP Readings from Last 3 Encounters:   12/19/19 136/86   11/25/19 (!) 150/86   07/26/18 132/84       Date of last diabetes office visit: 12/2019     Tobacco History:     History   Smoking Status     Never Smoker   Smokeless Tobacco     Former User     Types: Chew     Comment: quit in July 2006           Composite cancer screening  Chart review shows that this patient is due/due soon for the following   Summary:    Patient is due/failing the following:   A1c    Action needed:   Patient needs .    Type of outreach:    Phone, left message for patient to call back.     Questions for provider review:    None                                                                                                                                         Chart routed to Care Team .

## 2020-07-15 DIAGNOSIS — E11.9 DIABETES MELLITUS, TYPE 2 (H): ICD-10-CM

## 2020-07-15 DIAGNOSIS — E11.9 TYPE 2 DIABETES MELLITUS WITHOUT COMPLICATION, WITHOUT LONG-TERM CURRENT USE OF INSULIN (H): Primary | ICD-10-CM

## 2020-07-15 NOTE — LETTER
[facility]  [address] [place] [id number]  [phone]        July 16, 2020  Bon [place]  [address] [facility] [id number]    Dear Bon,    I care about your health and have reviewed your health plan. I have reviewed your medical conditions, medication list, and lab results and am making recommendations based on this review, to better manage your health.    You are in particular need of attention regarding:  -Diabetes    I am recommending that you:  -schedule a FOLLOWUP OFFICE APPOINTMENT with me. Please call us at [phone] to schedule.      Thank you for trusting [facility] and we appreciate the opportunity to serve you.  We look forward to supporting your healthcare needs in the future.    Healthy Regards,      Justin Acosta MD/Shannon MOJICA RN, BSN

## 2020-07-16 RX ORDER — FLASH GLUCOSE SENSOR
KIT MISCELLANEOUS
Qty: 2 EACH | Refills: 3 | Status: SHIPPED | OUTPATIENT
Start: 2020-07-16 | End: 2020-11-04

## 2020-07-16 NOTE — TELEPHONE ENCOUNTER
Routing refill request to provider for review/approval because:  Not on the [facility] refill protocol     Letter mailed to pt as he is due for labs & office visit.    Shannon MOJICA RN, BSN

## 2020-08-26 ENCOUNTER — OFFICE VISIT (OUTPATIENT)
Dept: FAMILY MEDICINE | Facility: CLINIC | Age: 45
End: 2020-08-26
Payer: COMMERCIAL

## 2020-08-26 VITALS
WEIGHT: 274 LBS | DIASTOLIC BLOOD PRESSURE: 88 MMHG | HEIGHT: 73 IN | TEMPERATURE: 98.7 F | HEART RATE: 80 BPM | RESPIRATION RATE: 18 BRPM | BODY MASS INDEX: 36.31 KG/M2 | SYSTOLIC BLOOD PRESSURE: 136 MMHG

## 2020-08-26 DIAGNOSIS — I10 ESSENTIAL HYPERTENSION WITH GOAL BLOOD PRESSURE LESS THAN 140/90: ICD-10-CM

## 2020-08-26 DIAGNOSIS — E11.9 TYPE 2 DIABETES MELLITUS WITHOUT COMPLICATION, WITHOUT LONG-TERM CURRENT USE OF INSULIN (H): Primary | ICD-10-CM

## 2020-08-26 DIAGNOSIS — E79.0 HYPERURICEMIA: ICD-10-CM

## 2020-08-26 DIAGNOSIS — K21.9 GASTROESOPHAGEAL REFLUX DISEASE WITHOUT ESOPHAGITIS: ICD-10-CM

## 2020-08-26 DIAGNOSIS — F41.9 ANXIETY: ICD-10-CM

## 2020-08-26 LAB
ANION GAP SERPL CALCULATED.3IONS-SCNC: 6 MMOL/L (ref 3–14)
BUN SERPL-MCNC: 18 MG/DL (ref 7–30)
CALCIUM SERPL-MCNC: 9.1 MG/DL (ref 8.5–10.1)
CHLORIDE SERPL-SCNC: 102 MMOL/L (ref 94–109)
CO2 SERPL-SCNC: 27 MMOL/L (ref 20–32)
CREAT SERPL-MCNC: 0.77 MG/DL (ref 0.66–1.25)
CREAT UR-MCNC: 110 MG/DL
GFR SERPL CREATININE-BSD FRML MDRD: >90 ML/MIN/{1.73_M2}
GLUCOSE SERPL-MCNC: 175 MG/DL (ref 70–99)
HBA1C MFR BLD: 7.7 % (ref 0–5.6)
MICROALBUMIN UR-MCNC: 75 MG/L
MICROALBUMIN/CREAT UR: 67.91 MG/G CR (ref 0–17)
POTASSIUM SERPL-SCNC: 4 MMOL/L (ref 3.4–5.3)
SODIUM SERPL-SCNC: 135 MMOL/L (ref 133–144)

## 2020-08-26 PROCEDURE — 82043 UR ALBUMIN QUANTITATIVE: CPT | Performed by: FAMILY MEDICINE

## 2020-08-26 PROCEDURE — 99207 C FOOT EXAM  NO CHARGE: CPT | Performed by: FAMILY MEDICINE

## 2020-08-26 PROCEDURE — 36415 COLL VENOUS BLD VENIPUNCTURE: CPT | Performed by: FAMILY MEDICINE

## 2020-08-26 PROCEDURE — 80048 BASIC METABOLIC PNL TOTAL CA: CPT | Performed by: FAMILY MEDICINE

## 2020-08-26 PROCEDURE — 83036 HEMOGLOBIN GLYCOSYLATED A1C: CPT | Performed by: FAMILY MEDICINE

## 2020-08-26 PROCEDURE — 99214 OFFICE O/P EST MOD 30 MIN: CPT | Performed by: FAMILY MEDICINE

## 2020-08-26 RX ORDER — ATENOLOL 50 MG/1
50 TABLET ORAL DAILY
Qty: 90 TABLET | Refills: 3 | Status: SHIPPED | OUTPATIENT
Start: 2020-08-26 | End: 2021-09-08

## 2020-08-26 RX ORDER — METFORMIN HCL 500 MG
500 TABLET, EXTENDED RELEASE 24 HR ORAL
Qty: 30 TABLET | Refills: 11 | Status: SHIPPED | OUTPATIENT
Start: 2020-08-26 | End: 2021-03-29 | Stop reason: SINTOL

## 2020-08-26 RX ORDER — LISINOPRIL 30 MG/1
30 TABLET ORAL DAILY
Qty: 90 TABLET | Refills: 3 | Status: SHIPPED | OUTPATIENT
Start: 2020-08-26 | End: 2021-10-05

## 2020-08-26 RX ORDER — ALLOPURINOL 300 MG/1
300 TABLET ORAL DAILY
Qty: 90 TABLET | Refills: 3 | Status: SHIPPED | OUTPATIENT
Start: 2020-08-26 | End: 2021-09-08

## 2020-08-26 ASSESSMENT — MIFFLIN-ST. JEOR: SCORE: 2191.74

## 2020-08-26 NOTE — PATIENT INSTRUCTIONS
ASSESSMENT/PLAN:                                                    Lifestyle recommendations:regular exercise, at least 150 minutes per week (average 30 minutes 5 times a week)  keep working on losing weight (ideal BMI-body mass index is <25)  Diabetic recommendations:-return for follow-up visit in 3 month(s)    (E11.9) Type 2 diabetes mellitus without complication, without long-term current use of insulin (H)  (primary encounter diagnosis)  Comment: Much improved since November but Hgb A1C a little higher than I would like (goal is <7 or around 150 average or below)  side effects from metformin immediate release.   Plan: Hemoglobin A1c, Basic metabolic panel          Try extended release metformin at 500mg once daily.  Best taken with evening meal.   If you have side effects, stop the medication and we can try something else.   Weight loss, regular exercise with goal of 30 minutes most days of the week and eating a prudent diet (lots of fruits and vegetables, limiting unhealthy fats and excessive calories) can help with diabetes mellitus control.     (E79.0) Hyperuricemia  Comment: gout doing well  Plan: allopurinol (ZYLOPRIM) 300 MG tablet        No change in current treatment plan.  REfills.     (I10) Essential hypertension with goal blood pressure less than 140/90  Comment: doing OK  Plan: atenolol (TENORMIN) 50 MG tablet, lisinopril         (ZESTRIL) 30 MG tablet        No change in current treatment plan.  Refills.     (K21.9) Gastroesophageal reflux disease without esophagitis  Comment: stable  Plan: omeprazole (PRILOSEC) 20 MG DR capsule        Refills.     (F41.9) Anxiety  Comment: sertraline makes him tired.   Plan: Take I/2 sertraline pill (25mg ) daily until you run out in about 8 days.   Start fluoxetine 20mg daily instead.   Let me know if not helping.

## 2020-08-26 NOTE — NURSING NOTE
"Chief Complaint   Patient presents with     Diabetes     Medication Reconciliation     quiet metformin due to side effects.       Initial /88   Pulse 80   Temp 98.7  F (37.1  C) (Tympanic)   Resp 18   Ht 1.854 m (6' 1\")   Wt 124.3 kg (274 lb)   BMI 36.15 kg/m   Estimated body mass index is 36.15 kg/m  as calculated from the following:    Height as of this encounter: 1.854 m (6' 1\").    Weight as of this encounter: 124.3 kg (274 lb).    Patient presents to the clinic using     Health Maintenance that is potentially due pending provider review:          Is there anyone who you would like to be able to receive your results?   If yes have patient fill out IRMA    "

## 2020-08-26 NOTE — PROGRESS NOTES
SUBJECTIVE: Bon Perkins is a 44 year old male  Chief Complaint   Patient presents with     Diabetes     Medication Reconciliation     quit metformin due to side effects.        He quit the metformin due to diarrhea back in December.   Stomach gurgling.    He tried dit again a couple months ago and had the came problems.   Diabetes Follow-up      How often are you checking your blood sugar? Has Sheree 171 BS reading at time of visit    He checks a couple times a day.  Glucometer range within the past month 129-396  30 day bds=735.  73% time in target    What concerns do you have today about your diabetes? None     Do you have any of these symptoms? (Select all that apply)  No numbness or tingling in feet.  No redness, sores or blisters on feet.  No complaints of excessive thirst.  No reports of blurry vision.  No significant changes to weight.    Have you had a diabetic eye exam in the last 12 months? No                Hyperlipidemia Follow-Up      Are you regularly taking any medication or supplement to lower your cholesterol?   No    Are you having muscle aches or other side effects that you think could be caused by your cholesterol lowering medication?      Hypertension Follow-up      Do you check your blood pressure regularly outside of the clinic? No     Are you following a low salt diet? Yes    Are your blood pressures ever more than 140 on the top number (systolic) OR more   than 90 on the bottom number (diastolic), for example 140/90?     BP Readings from Last 2 Encounters:   08/26/20 136/88   12/19/19 136/86     Hemoglobin A1C (%)   Date Value   08/26/2020 7.7 (H)   11/25/2019 11.2 (H)     LDL Cholesterol Calculated (mg/dL)   Date Value   07/26/2018 97   05/05/2014 141 (H)         How many servings of fruits and vegetables do you eat daily?  0-1    On average, how many sweetened beverages do you drink each day (Examples: soda, juice, sweet tea, etc.  Do NOT count diet or artificially sweetened beverages)?    0    How many days per week do you exercise enough to make your heart beat faster? Not now    How many minutes a day do you exercise enough to make your heart beat faster?     How many days per week do you miss taking your medication? 0    Last clinic visit:12/19/2019  Medication or other changes at last visit:none  Weight since last visit?  Up 7#  Wt Readings from Last 5 Encounters:   08/26/20 124.3 kg (274 lb)   12/19/19 121.1 kg (267 lb)   11/25/19 123.4 kg (272 lb)   07/26/18 132 kg (291 lb)   07/23/17 132.1 kg (291 lb 3.2 oz)      History   Smoking Status     Never Smoker   Smokeless Tobacco     Former User     Types: Chew     Comment: quit in July 2006       Past medical history reviewed and updated    Patient Active Problem List    Diagnosis Date Noted     Type 2 diabetes mellitus without complication, without long-term current use of insulin (H) 04/22/2020     Priority: Medium     November 2019 diagnosis.         Class 2 severe obesity with serious comorbidity and body mass index (BMI) of 35.0 to 35.9 in adult (H) 07/26/2018     Priority: Medium     Elevated liver enzymes 07/26/2018     Priority: Medium     Preseptal cellulitis 07/02/2012     Priority: Medium     Essential hypertension with goal blood pressure less than 140/90 02/20/2012     Priority: Medium     2/20/2012 off meds for years, had OK blood pressure. Today, 142/101. Will start on atenolol, as that had worked well before, and he needs NSAIDS, so will not use ACEI/ARB.       CARDIOVASCULAR SCREENING; LDL GOAL LESS THAN 130 10/31/2010     Priority: Medium     Idiopathic chronic gout of multiple sites without tophus 05/18/2010     Priority: Medium     ESOPHAGEAL REFLUX 12/20/2006     Priority: Medium     Current Outpatient Medications   Medication Sig Dispense Refill     allopurinol (ZYLOPRIM) 300 MG tablet Take 1 tablet (300 mg) by mouth daily 90 tablet 3     atenolol (TENORMIN) 50 MG tablet Take 1 tablet (50 mg) by mouth daily 90 tablet 3      "lisinopril (PRINIVIL/ZESTRIL) 30 MG tablet Take 1 tablet (30 mg) by mouth daily 90 tablet 3     omeprazole (PRILOSEC) 20 MG DR capsule Take 1 capsule (20 mg) by mouth daily 90 capsule 3     sertraline (ZOLOFT) 50 MG tablet Take 1 tablet (50 mg) by mouth daily 90 tablet 1     blood glucose (NO BRAND SPECIFIED) test strip Use to test blood sugar 2 times daily  To accompany: Blood Glucose Monitor Brands: per insurance. 100 strip 6     blood glucose monitoring (NO BRAND SPECIFIED) meter device kit Use to test blood sugar 2 times daily  Preferred blood glucose meter OR supplies to accompany: Blood Glucose Monitor Brands: per insurance. 1 kit 0     Continuous Blood Gluc Sensor (FREESTYLE WOJCIECH 14 DAY SENSOR) MISC USE 1 SENSOR EVERY 14 DAYS 2 each 3     thin (NO BRAND SPECIFIED) lancets Use with lanceting device. To accompany: Blood Glucose Monitor Brands: per insurance.  Test twice a day 60 each 6     ROS:General: No change in weight, sleep or appetite.  Normal energy.  No fever or chills  Resp: No coughing, wheezing or shortness of breath  CV: No chest pains or palpitations  GI: No nausea, vomiting,  heartburn, abdominal pain, diarrhea, constipation or change in bowel habits  : No urinary frequency or dysuria, bladder or kidney problems  Musculoskeletal: No significant muscle or joint pains  Psychiatric: He has been on sertraline for stress and anxiety related to meetings at his office.  It has helped calm him down.   Makes him tired.  He would like to try stopping.  Taking for the past 6 months.   No gout.     OBJECTIVE:Blood pressure 136/88, pulse 80, temperature 98.7  F (37.1  C), temperature source Tympanic, resp. rate 18, height 1.854 m (6' 1\"), weight 124.3 kg (274 lb). BMI=Body mass index is 36.15 kg/m .  GENERAL APPEARANCE ADULT: Alert, no acute distress, obese  MS: extremities normal, no peripheral edema   Foot exam:normal DP and PT pulses, no trophic changes or ulcerative lesions and normal monofilament " exam    ASSESSMENT/PLAN:                                                    Lifestyle recommendations:regular exercise, at least 150 minutes per week (average 30 minutes 5 times a week)  keep working on losing weight (ideal BMI-body mass index is <25)  Diabetic recommendations:-return for follow-up visit in 3 month(s)    (E11.9) Type 2 diabetes mellitus without complication, without long-term current use of insulin (H)  (primary encounter diagnosis)  Comment: Much improved since November but Hgb A1C a little higher than I would like (goal is <7 or around 150 average or below)  side effects from metformin immediate release.   Plan: Hemoglobin A1c, Basic metabolic panel          Try extended release metformin at 500mg once daily.  Best taken with evening meal.   If you have side effects, stop the medication and we can try something else.   Weight loss, regular exercise with goal of 30 minutes most days of the week and eating a prudent diet (lots of fruits and vegetables, limiting unhealthy fats and excessive calories) can help with diabetes mellitus control.     (E79.0) Hyperuricemia  Comment: gout doing well  Plan: allopurinol (ZYLOPRIM) 300 MG tablet        No change in current treatment plan.  REfills.     (I10) Essential hypertension with goal blood pressure less than 140/90  Comment: doing OK  Plan: atenolol (TENORMIN) 50 MG tablet, lisinopril         (ZESTRIL) 30 MG tablet        No change in current treatment plan.  Refills.     (K21.9) Gastroesophageal reflux disease without esophagitis  Comment: stable  Plan: omeprazole (PRILOSEC) 20 MG DR capsule        Refills.     (F41.9) Anxiety  Comment: sertraline makes him tired.   Plan: Take I/2 sertraline pill (25mg ) daily until you run out in about 8 days.   Start fluoxetine 20mg daily instead.   Let me know if not helping.        Diabetes Type II, A1c Controlled, non-insulin dependent   Associated with the following complications:none

## 2020-08-27 PROBLEM — R80.9 MICROALBUMINURIA: Status: ACTIVE | Noted: 2020-08-27

## 2020-08-27 NOTE — RESULT ENCOUNTER NOTE
Please call.  Urine tests show positive MAC (microalbumin/creatinine ratio) meaning protein leaking from the kidneys into the urine. This suggests some kidney damage from the diabetes mellitus.  The blood chemistries (Basic metabolic panel) are all normal including electrolytes (salt balances in the blood) and kidney tests with the exception of glucose which is high.     Hgb A1C has improved but still above goal of <7 as we discussed.   PLAN: Good control of diabetes mellitus, blood pressure <140/90 and the ACE category drugs like lisinopril can all help prevent further kidney damage.

## 2020-08-31 PROBLEM — F41.9 ANXIETY: Status: ACTIVE | Noted: 2020-08-31

## 2020-09-28 ENCOUNTER — TELEPHONE (OUTPATIENT)
Dept: FAMILY MEDICINE | Facility: CLINIC | Age: 45
End: 2020-09-28

## 2020-09-28 NOTE — TELEPHONE ENCOUNTER
Reason for Call:  Other     Detailed comments: Patient stopped the Prozac it was making him mad and he went back to zoloft.  and metformin working good - please update chart     Phone Number Patient can be reached at: Home number on file [phone] (home)    Best Time:     Can we leave a detailed message on this number? YES    Call taken on 9/28/2020 at 2:05 PM by Eleanor Sellers

## 2020-09-28 NOTE — TELEPHONE ENCOUNTER
I talked with Bon.  The Prozac made him irritable, so stopped it and went back to taking sertraline 50 mg daily.  He is happy and not irritable.  Says taking metformin  mg daily-- no diarrhea  FYI.   Adri Cobb RN

## 2020-10-12 ENCOUNTER — TRANSFERRED RECORDS (OUTPATIENT)
Dept: HEALTH INFORMATION MANAGEMENT | Facility: CLINIC | Age: 45
End: 2020-10-12

## 2020-10-12 LAB
RETINOPATHY: NEGATIVE
RETINOPATHY: NEGATIVE

## 2020-11-04 DIAGNOSIS — E11.9 TYPE 2 DIABETES MELLITUS WITHOUT COMPLICATION, WITHOUT LONG-TERM CURRENT USE OF INSULIN (H): ICD-10-CM

## 2020-11-04 RX ORDER — FLASH GLUCOSE SENSOR
KIT MISCELLANEOUS
Qty: 6 EACH | Refills: 3 | Status: SHIPPED | OUTPATIENT
Start: 2020-11-04 | End: 2021-12-13

## 2020-11-04 NOTE — TELEPHONE ENCOUNTER
Pres[place] [facility] [place]

## 2021-01-01 ENCOUNTER — TRANSFERRED RECORDS (OUTPATIENT)
Dept: MULTI SPECIALTY CLINIC | Facility: CLINIC | Age: 46
End: 2021-01-01
Payer: COMMERCIAL

## 2021-01-27 ENCOUNTER — OFFICE VISIT (OUTPATIENT)
Dept: FAMILY MEDICINE | Facility: CLINIC | Age: 46
End: 2021-01-27
Payer: COMMERCIAL

## 2021-01-27 VITALS
HEART RATE: 76 BPM | OXYGEN SATURATION: 96 % | DIASTOLIC BLOOD PRESSURE: 84 MMHG | HEIGHT: 73 IN | TEMPERATURE: 98.4 F | RESPIRATION RATE: 20 BRPM | BODY MASS INDEX: 35.25 KG/M2 | WEIGHT: 266 LBS | SYSTOLIC BLOOD PRESSURE: 128 MMHG

## 2021-01-27 DIAGNOSIS — E11.9 TYPE 2 DIABETES MELLITUS WITHOUT COMPLICATION, WITHOUT LONG-TERM CURRENT USE OF INSULIN (H): Primary | ICD-10-CM

## 2021-01-27 DIAGNOSIS — I10 ESSENTIAL HYPERTENSION WITH GOAL BLOOD PRESSURE LESS THAN 140/90: ICD-10-CM

## 2021-01-27 DIAGNOSIS — R80.9 MICROALBUMINURIA: ICD-10-CM

## 2021-01-27 DIAGNOSIS — F41.9 ANXIETY: ICD-10-CM

## 2021-01-27 LAB
CHOLEST SERPL-MCNC: 207 MG/DL
CREAT UR-MCNC: 124 MG/DL
HBA1C MFR BLD: 10.6 % (ref 0–5.6)
HDLC SERPL-MCNC: 42 MG/DL
LDLC SERPL CALC-MCNC: 101 MG/DL
MICROALBUMIN UR-MCNC: 56 MG/L
MICROALBUMIN/CREAT UR: 45.08 MG/G CR (ref 0–17)
NONHDLC SERPL-MCNC: 165 MG/DL
TRIGL SERPL-MCNC: 318 MG/DL

## 2021-01-27 PROCEDURE — 90471 IMMUNIZATION ADMIN: CPT | Performed by: FAMILY MEDICINE

## 2021-01-27 PROCEDURE — 90732 PPSV23 VACC 2 YRS+ SUBQ/IM: CPT | Performed by: FAMILY MEDICINE

## 2021-01-27 PROCEDURE — 90714 TD VACC NO PRESV 7 YRS+ IM: CPT | Performed by: FAMILY MEDICINE

## 2021-01-27 PROCEDURE — 36415 COLL VENOUS BLD VENIPUNCTURE: CPT | Performed by: FAMILY MEDICINE

## 2021-01-27 PROCEDURE — 83036 HEMOGLOBIN GLYCOSYLATED A1C: CPT | Performed by: FAMILY MEDICINE

## 2021-01-27 PROCEDURE — 80061 LIPID PANEL: CPT | Performed by: FAMILY MEDICINE

## 2021-01-27 PROCEDURE — 99214 OFFICE O/P EST MOD 30 MIN: CPT | Mod: 25 | Performed by: FAMILY MEDICINE

## 2021-01-27 PROCEDURE — 82043 UR ALBUMIN QUANTITATIVE: CPT | Performed by: FAMILY MEDICINE

## 2021-01-27 PROCEDURE — 90472 IMMUNIZATION ADMIN EACH ADD: CPT | Performed by: FAMILY MEDICINE

## 2021-01-27 RX ORDER — VENLAFAXINE HYDROCHLORIDE 37.5 MG/1
CAPSULE, EXTENDED RELEASE ORAL
Qty: 60 CAPSULE | Refills: 1 | Status: SHIPPED | OUTPATIENT
Start: 2021-01-27 | End: 2021-03-29 | Stop reason: ALTCHOICE

## 2021-01-27 ASSESSMENT — MIFFLIN-ST. JEOR: SCORE: 2150.45

## 2021-01-27 ASSESSMENT — ANXIETY QUESTIONNAIRES
2. NOT BEING ABLE TO STOP OR CONTROL WORRYING: MORE THAN HALF THE DAYS
GAD7 TOTAL SCORE: 19
7. FEELING AFRAID AS IF SOMETHING AWFUL MIGHT HAPPEN: NEARLY EVERY DAY
6. BECOMING EASILY ANNOYED OR IRRITABLE: NEARLY EVERY DAY
5. BEING SO RESTLESS THAT IT IS HARD TO SIT STILL: NEARLY EVERY DAY
1. FEELING NERVOUS, ANXIOUS, OR ON EDGE: MORE THAN HALF THE DAYS
3. WORRYING TOO MUCH ABOUT DIFFERENT THINGS: NEARLY EVERY DAY

## 2021-01-27 ASSESSMENT — PATIENT HEALTH QUESTIONNAIRE - PHQ9: 5. POOR APPETITE OR OVEREATING: NEARLY EVERY DAY

## 2021-01-27 NOTE — PROGRESS NOTES
ASSESSMENT:   (E11.9) Type 2 diabetes mellitus without complication, without long-term current use of insulin (H)  (primary encounter diagnosis)  Comment: not well controlled.  Plan: empagliflozin (JARDIANCE) 10 MG TABS tablet,         Albumin Random Urine Quantitative with Creat         Ratio, Lipid panel reflex to direct LDL         Fasting, Hemoglobin A1c          Blood and urine tests today.   Weight loss, regular exercise with goal of 30 minutes most days of the week and eating a prudent diet (lots of fruits and vegetables, limiting unhealthy fats and excessive calories) can help for diabetes mellitus control.     Start empagliflozin (Jardiance) 10mg daily for diabetes mellitus.   IF glucometers not doing well within a few weeks, we can increase the dose to 25mg.  We may need to add another medication if this is not controlling your diabetes mellitus.     (F41.9) Anxiety  Comment: not doing so well.  Has not tolerated fluoxetine and sertraline.   Plan: venlafaxine (EFFEXOR-XR) 37.5 MG 24 hr capsule          Try venlafaxine 37.5mg daily for two weeks.  INcrease to two pills (75mg) daily after two weeks.  Let me know if side effects and let me know in a month how you are doing.     (R80.9) Microalbuminuria  Comment: protein in urine, possible chronic kidney disease.   Plan: Recheck today.     (I10) Essential hypertension with goal blood pressure less than 140/90  Comment: doing well  Plan: No change in current treatment plan.          Frank Crockett is a 45 year old who presents to clinic today for the following health issues   Chief Complaint   Patient presents with     Diabetes     Anxiety        Diabetes Follow-up Off Metformin for 2 months due to side effects     How often are you checking your blood sugar? Continuous glucose monitor  He had diarrhea from both immediate release and ER formulations.  Even on the 500mg dose he had a couple stools a day.    Currently on no meds. He has been on no other  medication for diabetes mellitus.   He checks glucose with continuous freestyle juan monitor.  Checking at least 3-4 times a day Glucometer range within the past month 240-398.    What time of day are you checking your blood sugars (select all that apply)?  Not applicable  Have you had any blood sugars above 200?  Yes 300  Have you had any blood sugars below 70?  No    What symptoms do you notice when your blood sugar is low?  None    What concerns do you have today about your diabetes? Other: Needs new medication     Do you have any of these symptoms? (Select all that apply)  Blurry vision and Weight loss      BP Readings from Last 2 Encounters:   01/27/21 128/84   08/26/20 136/88     Hemoglobin A1C (%)   Date Value   08/26/2020 7.7 (H)   11/25/2019 11.2 (H)     LDL Cholesterol Calculated (mg/dL)   Date Value   07/26/2018 97   05/05/2014 141 (H)               Anxiety Follow-Up    How are you doing with your anxiety since your last visit? Worsened     Are you having other symptoms that might be associated with anxiety? No    Have you had a significant life event? No     Are you feeling depressed? No    Do you have any concerns with your use of alcohol or other drugs? No    Social History     Tobacco Use     Smoking status: Never Smoker     Smokeless tobacco: Former User     Types: Chew     Tobacco comment: quit in July 2006   Substance Use Topics     Alcohol use: Yes     Comment: daily to occ     Drug use: No     YOSHI-7 SCORE 1/27/2021   Total Score 19     No flowsheet data found.  YOSHI-7  1/27/2021   1. Feeling nervous, anxious, or on edge 2   2. Not being able to stop or control worrying 2   3. Worrying too much about different things 3   4. Trouble relaxing 3   5. Being so restless that it is hard to sit still 3   6. Becoming easily annoyed or irritable 3   7. Feeling afraid, as if something awful might happen 3   YOSHI-7 Total Score 19   He had been on sertraline.  He feels no motivation.  Did not help anxiety.  It  calmed him down a little.  Made him tired.    He was switched to fluoxetine 20mg daily.  Made him anxious.  Took only 2-3 weeks.   Not feeling depressed.   Occupation:  at [employer]. .        How many servings of fruits and vegetables do you eat daily?  2-3    On average, how many sweetened beverages do you drink each day (Examples: soda, juice, sweet tea, etc.  Do NOT count diet or artificially sweetened beverages)?   0    How many days per week do you exercise enough to make your heart beat faster? None    How many minutes a day do you exercise enough to make your heart beat faster? NA    How many days per week do you miss taking your medication? 0    Last clinic visit:8/26/2020  Medication or other changes at last visit:Trial ER metformin.  Switched sertraline to fluoxetine   Weight since last visit? Down 8#  Wt Readings from Last 5 Encounters:   01/27/21 120.7 kg (266 lb)   08/26/20 124.3 kg (274 lb)   12/19/19 121.1 kg (267 lb)   11/25/19 123.4 kg (272 lb)   07/26/18 132 kg (291 lb)      History   Smoking Status     Never Smoker   Smokeless Tobacco     Former User     Types: Chew     Comment: quit in July 2006       Past medical history reviewed and updated    Patient Active Problem List    Diagnosis Date Noted     Anxiety 08/31/2020     Priority: Medium     Microalbuminuria 08/27/2020     Priority: Medium     Type 2 diabetes mellitus without complication, without long-term current use of insulin (H) 04/22/2020     Priority: Medium     November 2019 diagnosis.         Class 2 severe obesity with serious comorbidity and body mass index (BMI) of 35.0 to 35.9 in adult (H) 07/26/2018     Priority: Medium     Elevated liver enzymes 07/26/2018     Priority: Medium     Preseptal cellulitis 07/02/2012     Priority: Medium     Essential hypertension with goal blood pressure less than 140/90 02/20/2012     Priority: Medium     2/20/2012 off meds for years, had OK blood pressure. Today, 142/101. Will start  on atenolol, as that had worked well before, and he needs NSAIDS, so will not use ACEI/ARB.       CARDIOVASCULAR SCREENING; LDL GOAL LESS THAN 130 10/31/2010     Priority: Medium     Idiopathic chronic gout of multiple sites without tophus 05/18/2010     Priority: Medium     ESOPHAGEAL REFLUX 12/20/2006     Priority: Medium     Current Outpatient Medications   Medication Sig Dispense Refill     allopurinol (ZYLOPRIM) 300 MG tablet Take 1 tablet (300 mg) by mouth daily 90 tablet 3     atenolol (TENORMIN) 50 MG tablet Take 1 tablet (50 mg) by mouth daily 90 tablet 3     Continuous Blood Gluc Sensor (FREESTYLE WOJCIECH 14 DAY SENSOR) MISC USE 1 SENSOR EVERY 14 DAYS 6 each 3     lisinopril (ZESTRIL) 30 MG tablet Take 1 tablet (30 mg) by mouth daily 90 tablet 3     omeprazole (PRILOSEC) 20 MG DR capsule Take 1 capsule (20 mg) by mouth daily 90 capsule 3     sertraline (ZOLOFT) 50 MG tablet Take 1 tablet (50 mg) by mouth daily 90 tablet 1     blood glucose (NO BRAND SPECIFIED) test strip Use to test blood sugar 2 times daily  To accompany: Blood Glucose Monitor Brands: per insurance. 100 strip 6     blood glucose monitoring (NO BRAND SPECIFIED) meter device kit Use to test blood sugar 2 times daily  Preferred blood glucose meter OR supplies to accompany: Blood Glucose Monitor Brands: per insurance. 1 kit 0     metFORMIN (GLUCOPHAGE-XR) 500 MG 24 hr tablet Take 1 tablet (500 mg) by mouth daily (with dinner) 30 tablet 11     thin (NO BRAND SPECIFIED) lancets Use with lanceting device. To accompany: Blood Glucose Monitor Brands: per insurance.  Test twice a day 60 each 6     ROS:General: No change in weight, sleep or appetite.  Normal energy.  No fever or chills  Resp: No coughing, wheezing or shortness of breath  CV: No chest pains or palpitations  GI: No nausea, vomiting,  heartburn, abdominal pain, diarrhea, constipation or change in bowel habits  : No urinary frequency or dysuria, bladder or kidney  "problems  Musculoskeletal: POSITIVE  for:, pain low back.  NO gout on allopurinol.   Psychiatric: see above     OBJECTIVE:Blood pressure 128/84, pulse 76, temperature 98.4  F (36.9  C), temperature source Tympanic, resp. rate 20, height 1.854 m (6' 1\"), weight 120.7 kg (266 lb), SpO2 96 %. BMI=Body mass index is 35.09 kg/m .  GENERAL APPEARANCE ADULT: Alert, no acute distress  NECK: No adenopathy,masses or thyromegaly  RESP: lungs clear to auscultation   CV: normal rate, regular rhythm, no murmur or gallop  ABDOMEN: soft, no organomegaly, masses or tenderness  MS: extremities normal, no peripheral edema   Foot exam:normal DP and PT pulses, no trophic changes or ulcerative lesions and normal monofilament exam               "

## 2021-01-27 NOTE — LETTER
"January 29, 2021       Bon Perkins  [address] [facility] [id number]        Dear ,    We are writing to inform you of your test results.    The HDL \"good cholesterol\" is at a normal level.  LDL borderline increased.   Triglycerides, a type of fat found in the bloodstream is high.   Urine tests show positive MAC (microalbumin/creatinine ratio) meaning protein leaking from the kidneys into the urine. This is a sign of some kidney damage from the diabetes mellitus.    Hgb A1C is very high indicating poor control of his diabetes mellitus.      PLAN: Start empagliflozin (Jardiance) as planned.  I am guessing that he will need at least two medications to bring down glucose but we can start with the empagliflozin (Jardiance).    Weight loss, regular exercise with goal of 30 minutes most days of the week and eating a prudent diet (lots of fruits and vegetables, limiting unhealthy fats and excessive calories) can help improve diabetes mellitus.   He has chronic kidney disease which is a complication of diabetes mellitus.  Good control of diabetes mellitus, blood pressure <140/90 and the ACE category drugs like lisinopril can all help prevent further kidney damage.      There is a significant risk for heart attacks and strokes.   I recommend adding a cholesterol lowering medication to lower the chances of heart attacks and strokes as we discussed in clinic.    I suggest starting atorvastatin 20mg daily.    If willing to add this medication, we can do prescription for #30 with 11 refills.    Also important for anyone with high cholesterol is weight loss, regular exercise with goal of 30 minutes most days of the week and eating a prudent diet (lots of fruits and vegetables, limiting unhealthy fats and excessive calories).  I recommend fasting lipid profile blood and ALT test in 2-3 months to see how well medication is working.        Recheck in 3 months for diabetes mellitus and recheck cholesterol.    If blood " glucose not doing well in a month, we can increase the empagliflozin (Jardiance) dose    Resulted Orders   Albumin Random Urine Quantitative with Creat Ratio   Result Value Ref Range    Creatinine Urine 124 mg/dL    Albumin Urine mg/L 56 mg/L    Albumin Urine mg/g Cr 45.08 (H) 0 - 17 mg/g Cr   Lipid panel reflex to direct LDL Fasting   Result Value Ref Range    Cholesterol 207 (H) <200 mg/dL      Comment:      Desirable:       <200 mg/dl    Triglycerides 318 (H) <150 mg/dL      Comment:      Borderline high:  150-199 mg/dl  High:             200-499 mg/dl  Very high:       >499 mg/dl  Non Fasting      HDL Cholesterol 42 >39 mg/dL    LDL Cholesterol Calculated 101 (H) <100 mg/dL      Comment:      Above desirable:  100-129 mg/dl  Borderline High:  130-159 mg/dL  High:             160-189 mg/dL  Very high:       >189 mg/dl      Non HDL Cholesterol 165 (H) <130 mg/dL      Comment:      Above Desirable:  130-159 mg/dl  Borderline high:  160-189 mg/dl  High:             190-219 mg/dl  Very high:       >219 mg/dl     Hemoglobin A1c   Result Value Ref Range    Hemoglobin A1C 10.6 (H) 0 - 5.6 %      Comment:      Normal <5.7% Prediabetes 5.7-6.4%  Diabetes 6.5% or higher - adopted from ADA   consensus guidelines.  Results confirmed by repeat test         If you have any questions or concerns, please call the clinic at the number listed above.       Sincerely,      Justin Acosta MD

## 2021-01-27 NOTE — PATIENT INSTRUCTIONS
ASSESSMENT:   (E11.9) Type 2 diabetes mellitus without complication, without long-term current use of insulin (H)  (primary encounter diagnosis)  Comment: not well controlled.  Plan: empagliflozin (JARDIANCE) 10 MG TABS tablet,         Albumin Random Urine Quantitative with Creat         Ratio, Lipid panel reflex to direct LDL         Fasting, Hemoglobin A1c          Blood and urine tests today.   Weight loss, regular exercise with goal of 30 minutes most days of the week and eating a prudent diet (lots of fruits and vegetables, limiting unhealthy fats and excessive calories) can help for diabetes mellitus control.     Start empagliflozin (Jardiance) 10mg daily for diabetes mellitus.   IF glucometers not doing well within a few weeks, we can increase the dose to 25mg.  We may need to add another medication if this is not controlling your diabetes mellitus.     (F41.9) Anxiety  Comment: not doing so well.  Has not tolerated fluoxetine and sertraline.   Plan: venlafaxine (EFFEXOR-XR) 37.5 MG 24 hr capsule          Try venlafaxine 37.5mg daily for two weeks.  INcrease to two pills (75mg) daily after two weeks.  Let me know if side effects and let me know in a month how you are doing.     (R80.9) Microalbuminuria  Comment: protein in urine, possible chronic kidney disease.   Plan: Recheck today.     (I10) Essential hypertension with goal blood pressure less than 140/90  Comment: doing well  Plan: No change in current treatment plan.

## 2021-01-27 NOTE — LETTER
"January 28, 2021      Bon Perkins  [address] [facility] [id number]        Dear ,    We are writing to inform you of your test results.    The HDL \"good cholesterol\" is at a normal level.  LDL borderline increased.   Triglycerides, a type of fat found in the bloodstream is high.   Urine tests show positive MAC (microalbumin/creatinine ratio) meaning protein leaking from the kidneys into the urine. This is a sign of some kidney damage from the diabetes mellitus.    Hgb A1C is very high indicating poor control of his diabetes mellitus.      PLAN: Start empagliflozin (Jardiance) as planned.  I am guessing that he will need at least two medications to bring down glucose but we can start with the empagliflozin (Jardiance).    Weight loss, regular exercise with goal of 30 minutes most days of the week and eating a prudent diet (lots of fruits and vegetables, limiting unhealthy fats and excessive calories) can help improve diabetes mellitus.   He has chronic kidney disease which is a complication of diabetes mellitus.  Good control of diabetes mellitus, blood pressure <140/90 and the ACE category drugs like lisinopril can all help prevent further kidney damage.      There is a significant risk for heart attacks and strokes.   I recommend adding a cholesterol lowering medication to lower the chances of heart attacks and strokes as we discussed in clinic.    I suggest starting atorvastatin 20mg daily.      Also important for anyone with high cholesterol is weight loss, regular exercise with goal of 30 minutes most days of the week and eating a prudent diet (lots of fruits and vegetables, limiting unhealthy fats and excessive calories).  I recommend fasting lipid profile blood and ALT test in 2-3 months to see how well medication is working. (schedule a lab only appointment)      Recheck in 3 months for diabetes mellitus and recheck cholesterol. Schedule an appointment with me.    If blood glucose not doing " well in a month, we can increase the empagliflozin (Jardiance) dose.    Resulted Orders   Albumin Random Urine Quantitative with Creat Ratio   Result Value Ref Range    Creatinine Urine 124 mg/dL    Albumin Urine mg/L 56 mg/L    Albumin Urine mg/g Cr 45.08 (H) 0 - 17 mg/g Cr   Lipid panel reflex to direct LDL Fasting   Result Value Ref Range    Cholesterol 207 (H) <200 mg/dL      Comment:      Desirable:       <200 mg/dl    Triglycerides 318 (H) <150 mg/dL      Comment:      Borderline high:  150-199 mg/dl  High:             200-499 mg/dl  Very high:       >499 mg/dl  Non Fasting      HDL Cholesterol 42 >39 mg/dL    LDL Cholesterol Calculated 101 (H) <100 mg/dL      Comment:      Above desirable:  100-129 mg/dl  Borderline High:  130-159 mg/dL  High:             160-189 mg/dL  Very high:       >189 mg/dl      Non HDL Cholesterol 165 (H) <130 mg/dL      Comment:      Above Desirable:  130-159 mg/dl  Borderline high:  160-189 mg/dl  High:             190-219 mg/dl  Very high:       >219 mg/dl     Hemoglobin A1c   Result Value Ref Range    Hemoglobin A1C 10.6 (H) 0 - 5.6 %      Comment:      Normal <5.7% Prediabetes 5.7-6.4%  Diabetes 6.5% or higher - adopted from ADA   consensus guidelines.  Results confirmed by repeat test         If you have any questions or concerns, please call the clinic at the number listed above.       Sincerely,      Justin Acosta MD/kathrine

## 2021-01-28 ENCOUNTER — TELEPHONE (OUTPATIENT)
Dept: FAMILY MEDICINE | Facility: CLINIC | Age: 46
End: 2021-01-28

## 2021-01-28 DIAGNOSIS — E11.22 TYPE 2 DIABETES MELLITUS WITH STAGE 1 CHRONIC KIDNEY DISEASE, WITHOUT LONG-TERM CURRENT USE OF INSULIN (H): Primary | ICD-10-CM

## 2021-01-28 DIAGNOSIS — N18.1 TYPE 2 DIABETES MELLITUS WITH STAGE 1 CHRONIC KIDNEY DISEASE, WITHOUT LONG-TERM CURRENT USE OF INSULIN (H): Primary | ICD-10-CM

## 2021-01-28 DIAGNOSIS — E78.5 HYPERLIPIDEMIA LDL GOAL <100: ICD-10-CM

## 2021-01-28 RX ORDER — ATORVASTATIN CALCIUM 20 MG/1
20 TABLET, FILM COATED ORAL DAILY
Qty: 30 TABLET | Refills: 11 | Status: CANCELLED | OUTPATIENT
Start: 2021-01-28

## 2021-01-28 RX ORDER — ATORVASTATIN CALCIUM 20 MG/1
20 TABLET, FILM COATED ORAL DAILY
Qty: 30 TABLET | Refills: 2 | Status: SHIPPED | OUTPATIENT
Start: 2021-01-28 | End: 2021-04-12

## 2021-01-28 ASSESSMENT — ANXIETY QUESTIONNAIRES: GAD7 TOTAL SCORE: 19

## 2021-01-28 NOTE — TELEPHONE ENCOUNTER
----- Message from Justin Acosta MD sent at 1/28/2021  3:10 PM CST -----    I suggest starting atorvastatin 20mg daily.    If willing to add this medication, we can do prescription for #30 with 11 refills.      I recommend fasting lipid profile blood and ALT test in 2-3 months to see how well medication is working.         Patient notified  Providers recommendations and lab results. Patient is agreeable to starting Atorvastatin. RN please send prescription to [facility] pharmacy. Patient will check pharmacy. No need to call patient.

## 2021-01-28 NOTE — TELEPHONE ENCOUNTER
----- Message from Justin Acosta MD sent at 1/28/2021  3:10 PM CST -----    I suggest starting atorvastatin 20mg daily.    If willing to add this medication, we can do prescription for #30 with 11 refills.      I recommend fasting lipid profile blood and ALT test in 2-3 months to see how well medication is working.         Patient notified  Providers recommendations and lab results. Patient is agreeable to starting Atorvastatin. RN please send prescription to [facility] pharmacy. Patient will check pharmacy. No need to call patient.

## 2021-01-28 NOTE — RESULT ENCOUNTER NOTE
"Please call.  The HDL \"good cholesterol\" is at a normal level.  LDL borderline increased.   Triglycerides, a type of fat found in the bloodstream is high.   Urine tests show positive MAC (microalbumin/creatinine ratio) meaning protein leaking from the kidneys into the urine. This is a sign of some kidney damage from the diabetes mellitus.    Hgb A1C is very high indicating poor control of his diabetes mellitus.     PLAN: Start empagliflozin (Jardiance) as planned.  I am guessing that he will need at least two medications to bring down glucose but we can start with the empagliflozin (Jardiance).    Weight loss, regular exercise with goal of 30 minutes most days of the week and eating a prudent diet (lots of fruits and vegetables, limiting unhealthy fats and excessive calories) can help improve diabetes mellitus.   He has chronic kidney disease which is a complication of diabetes mellitus.  Good control of diabetes mellitus, blood pressure <140/90 and the ACE category drugs like lisinopril can all help prevent further kidney damage.     There is a significant risk for heart attacks and strokes.   I recommend adding a cholesterol lowering medication to lower the chances of heart attacks and strokes as we discussed in clinic.    I suggest starting atorvastatin 20mg daily.    If willing to add this medication, we can do prescription for #30 with 11 refills.    Also important for anyone with high cholesterol is weight loss, regular exercise with goal of 30 minutes most days of the week and eating a prudent diet (lots of fruits and vegetables, limiting unhealthy fats and excessive calories).  I recommend fasting lipid profile blood and ALT test in 2-3 months to see how well medication is working.       Recheck in 3 months for diabetes mellitus and recheck cholesterol.    If blood glucose not doing well in a month, we can increase the empagliflozin (Jardiance) dose."

## 2021-02-01 DIAGNOSIS — F41.9 ANXIETY: ICD-10-CM

## 2021-03-21 DIAGNOSIS — R73.09 ELEVATED GLUCOSE: ICD-10-CM

## 2021-03-22 RX ORDER — BLOOD SUGAR DIAGNOSTIC
STRIP MISCELLANEOUS
Qty: 60 STRIP | Refills: 0 | Status: SHIPPED | OUTPATIENT
Start: 2021-03-22 | End: 2021-03-29

## 2021-03-26 DIAGNOSIS — E11.9 TYPE 2 DIABETES MELLITUS WITHOUT COMPLICATION, WITHOUT LONG-TERM CURRENT USE OF INSULIN (H): ICD-10-CM

## 2021-03-26 NOTE — TELEPHONE ENCOUNTER
"Prescription approved/transferred to mail order pharmacy per [facility] Refill Protocol.    Requested Prescriptions   Pending Prescriptions Disp Refills     empagliflozin (JARDIANCE) 10 MG TABS tablet 30 tablet 11     Sig: Take 1 tablet (10 mg) by mouth daily       Sodium Glucose Co-Transport Inhibitor Agents Passed - 3/26/2021  1:49 PM        Passed - Patient has documented A1c within the specified period of time.     If HgbA1C is 8 or greater, it needs to be on file within the past 3 months.  If less than 8, must be on file within the past 6 months.     Recent Labs   Lab Test 01/27/21  1340   A1C 10.6*             Passed - No creatinine >1.4 or GFR <45 within the past 12 mos     Recent Labs   Lab Test 08/26/20  1105   GFRESTIMATED >90   GFRESTBLACK >90       Recent Labs   Lab Test 08/26/20  1105   CR 0.77             Passed - Medication is active on med list        Passed - Patient is age 18 or older        Passed - Patient has documented normal Potassium within the last 12 mos.     Recent Labs   Lab Test 08/26/20  1105   POTASSIUM 4.0             Passed - Recent (6 mo) or future (30 days) visit within the authorizing provider's specialty     Patient had office visit in the last 6 months or has a visit in the next 30 days with authorizing provider or within the authorizing provider's specialty.  See \"Patient Info\" tab in inbasket, or \"Choose Columns\" in Meds & Orders section of the refill encounter.             Shannon MOJICA RN, BSN      "

## 2021-03-26 NOTE — TELEPHONE ENCOUNTER
Bon would like a order of Jardiance sent [facility] as it is over 500 dollars a month to fill at retail here.

## 2021-03-29 ENCOUNTER — OFFICE VISIT (OUTPATIENT)
Dept: FAMILY MEDICINE | Facility: CLINIC | Age: 46
End: 2021-03-29
Payer: COMMERCIAL

## 2021-03-29 VITALS
HEART RATE: 70 BPM | HEIGHT: 73 IN | DIASTOLIC BLOOD PRESSURE: 86 MMHG | BODY MASS INDEX: 35.39 KG/M2 | TEMPERATURE: 98.8 F | WEIGHT: 267 LBS | SYSTOLIC BLOOD PRESSURE: 138 MMHG | RESPIRATION RATE: 16 BRPM

## 2021-03-29 DIAGNOSIS — F41.9 ANXIETY: ICD-10-CM

## 2021-03-29 DIAGNOSIS — E11.9 TYPE 2 DIABETES MELLITUS WITHOUT COMPLICATION, WITHOUT LONG-TERM CURRENT USE OF INSULIN (H): ICD-10-CM

## 2021-03-29 DIAGNOSIS — E78.5 HYPERLIPIDEMIA LDL GOAL <100: ICD-10-CM

## 2021-03-29 PROCEDURE — 99214 OFFICE O/P EST MOD 30 MIN: CPT | Performed by: FAMILY MEDICINE

## 2021-03-29 RX ORDER — ASPIRIN 81 MG/1
81 TABLET, CHEWABLE ORAL DAILY
Qty: 30 TABLET | Refills: 11
Start: 2021-03-29 | End: 2022-12-23

## 2021-03-29 RX ORDER — VENLAFAXINE 75 MG/1
75 TABLET ORAL 3 TIMES DAILY
Qty: 90 TABLET | Refills: 3 | Status: SHIPPED | OUTPATIENT
Start: 2021-03-29 | End: 2021-04-08

## 2021-03-29 RX ORDER — BLOOD SUGAR DIAGNOSTIC
100 STRIP MISCELLANEOUS DAILY
Qty: 100 STRIP | Refills: 0 | Status: SHIPPED | OUTPATIENT
Start: 2021-03-29 | End: 2024-07-02

## 2021-03-29 ASSESSMENT — ANXIETY QUESTIONNAIRES
GAD7 TOTAL SCORE: 3
6. BECOMING EASILY ANNOYED OR IRRITABLE: SEVERAL DAYS
1. FEELING NERVOUS, ANXIOUS, OR ON EDGE: SEVERAL DAYS
7. FEELING AFRAID AS IF SOMETHING AWFUL MIGHT HAPPEN: NOT AT ALL
3. WORRYING TOO MUCH ABOUT DIFFERENT THINGS: NOT AT ALL
2. NOT BEING ABLE TO STOP OR CONTROL WORRYING: NOT AT ALL
5. BEING SO RESTLESS THAT IT IS HARD TO SIT STILL: SEVERAL DAYS

## 2021-03-29 ASSESSMENT — MIFFLIN-ST. JEOR: SCORE: 2154.98

## 2021-03-29 ASSESSMENT — PATIENT HEALTH QUESTIONNAIRE - PHQ9: 5. POOR APPETITE OR OVEREATING: NOT AT ALL

## 2021-03-29 NOTE — PATIENT INSTRUCTIONS
ASSESSMENT/PLAN:                                                    Lifestyle recommendations:regular exercise, at least 150 minutes per week (average 30 minutes 5 times a week)  keep working on losing weight (ideal BMI-body mass index is <25)  Diabetic recommendations:-return for follow-up visit in 3 month(s)    (E11.9) Type 2 diabetes mellitus without complication, without long-term current use of insulin (H)  Comment: improved on the empagliflozin (Jardiance)   Plan: empagliflozin (JARDIANCE) 25 MG TABS tablet        Increase the dose to 25mg once daily.  You can take two of the 10mg pills until gone.    Recheck in 3 months with  Blood tests.  Let me know if side effects.   Take 81mg aspirin once daily to help prevent heart attacks.     (E78.5) Hyperlipidemia LDL goal <100  Comment:   Plan: Check in three months.   It is recommended that all diabetics take cholesterol lowering medication dur to increased risk of heart attacks unless LDL is under 70.     (F41.9) Anxiety  Comment: Did not do well on sertraline or fluoxetine.  Plan: venlafaxine (EFFEXOR) 75 MG tablet        The venlafaxine seems to be working well.   Refills.   No change in current treatment plan.        Diabetes complications/Comorbid conditions:  Nephropathy:ckd stage 1    Retinopathy: no  Neuropathy: no  Vascular disease: no  High/low problems: no  Tobacco use: chews     Treatments:  Statin: not on yet-recommended  Exercise: walking.   ACE: lisinopril.   ASA: recommend 81mg daily

## 2021-03-29 NOTE — NURSING NOTE
"Chief Complaint   Patient presents with     Anxiety     Diabetes       Initial /86   Pulse 70   Temp 98.8  F (37.1  C) (Tympanic)   Resp 16   Ht 1.854 m (6' 1\")   Wt 121.1 kg (267 lb)   BMI 35.23 kg/m   Estimated body mass index is 35.23 kg/m  as calculated from the following:    Height as of this encounter: 1.854 m (6' 1\").    Weight as of this encounter: 121.1 kg (267 lb).    Patient presents to the clinic using     Health Maintenance that is potentially due pending provider review:          Is there anyone who you would like to be able to receive your results?   If yes have patient fill out IRMA    "

## 2021-03-29 NOTE — PROGRESS NOTES
ASSESSMENT/PLAN:                                                    Lifestyle recommendations:regular exercise, at least 150 minutes per week (average 30 minutes 5 times a week)  keep working on losing weight (ideal BMI-body mass index is <25)  Diabetic recommendations:-return for follow-up visit in 3 month(s)    (E11.9) Type 2 diabetes mellitus without complication, without long-term current use of insulin (H)  Comment: improved on the empagliflozin (Jardiance)   Plan: empagliflozin (JARDIANCE) 25 MG TABS tablet        Increase the dose to 25mg once daily.  You can take two of the 10mg pills until gone.    Recheck in 3 months with  Blood tests.  Let me know if side effects.   Take 81mg aspirin once daily to help prevent heart attacks.     (E78.5) Hyperlipidemia LDL goal <100  Comment:   Plan: Check in three months.   It is recommended that all diabetics take cholesterol lowering medication dur to increased risk of heart attacks unless LDL is under 70.     (F41.9) Anxiety  Comment: Did not do well on sertraline or fluoxetine.  Plan: venlafaxine (EFFEXOR) 75 MG tablet        The venlafaxine seems to be working well.   Refills.   No change in current treatment plan.        Diabetes complications/Comorbid conditions:  Nephropathy:ckd stage 1    Retinopathy: no  Neuropathy: no  Vascular disease: no  High/low problems: no  Tobacco use: chews     Treatments:  Statin: not on yet-recommended  Exercise: walking.   ACE: lisinopril.   ASA: recommend 81mg daily.       Subjective   Bon is a 45 year old who presents for the following health issues  Chief Complaint   Patient presents with     Anxiety     Diabetes        Diabetes Follow-up      How often are you checking your blood sugar? Has Sheree 130-166 can be still over 200,    Checking glucometers 2-4  Times a day.   Glucometer range within the past month=128-356.  Glucometers high after eating.  Better before meals.       Lab Results   Component Value Date    A1C 10.6  01/27/2021     He has been taking empagliflozin (Jardiance) 10mg daily.  No side effects noted. Maybe a little more frequent urination.      What concerns do you have today about your diabetes? Medication     Do you have any of these symptoms? (Select all that apply)  No numbness or tingling in feet.  No redness, sores or blisters on feet.  No complaints of excessive thirst.  No reports of blurry vision.  No significant changes to weight.    Meter avg30 day =178  Time in target 21% in the past 30 days.  141-240 69%, >240 10%  BP Readings from Last 2 Encounters:   01/27/21 128/84   08/26/20 136/88     Hemoglobin A1C (%)   Date Value   01/27/2021 10.6 (H)   08/26/2020 7.7 (H)     LDL Cholesterol Calculated (mg/dL)   Date Value   01/27/2021 101 (H)   07/26/2018 97       Anxiety Follow-Up    How are you doing with your anxiety since your last visit? Improved     Are you having other symptoms that might be associated with anxiety? No    Have you had a significant life event? No     Are you feeling depressed? No    Do you have any concerns with your use of alcohol or other drugs? No    Taking venlafaxine 75mg daily. Working well.  NO side effects.  Feels it has made a big difference.   generalized anxiety disorder scale score today= 3  Social History     Tobacco Use     Smoking status: Never Smoker     Smokeless tobacco: Former User     Types: Chew     Tobacco comment: quit in July 2006   Substance Use Topics     Alcohol use: Yes     Comment: daily to occ     Drug use: No     YOSHI-7 SCORE 1/27/2021   Total Score 19     No flowsheet data found.      Last clinic visit:1/27/21  Medication or other changes at last visit:Added empagliflozin (Jardiance) Started venlafaxine.  Recommended atorvastatin   Weight since last visit? Up 1#  Wt Readings from Last 5 Encounters:   03/29/21 121.1 kg (267 lb)   01/27/21 120.7 kg (266 lb)   08/26/20 124.3 kg (274 lb)   12/19/19 121.1 kg (267 lb)   11/25/19 123.4 kg (272 lb)      History    Smoking Status     Never Smoker   Smokeless Tobacco     Former User     Types: Chew     Comment: quit in July 2006       Past medical history reviewed and updated    Patient Active Problem List    Diagnosis Date Noted     Hyperlipidemia LDL goal <100 01/28/2021     Priority: Medium     CKD (chronic kidney disease) stage 1, GFR 90 ml/min or greater 01/28/2021     Priority: Medium     Anxiety 08/31/2020     Priority: Medium     Microalbuminuria 08/27/2020     Priority: Medium     Type 2 diabetes mellitus with stage 1 chronic kidney disease, without long-term current use of insulin (H) 04/22/2020     Priority: Medium     November 2019 diagnosis.         Class 2 severe obesity with serious comorbidity and body mass index (BMI) of 35.0 to 35.9 in adult (H) 07/26/2018     Priority: Medium     Elevated liver enzymes 07/26/2018     Priority: Medium     Preseptal cellulitis 07/02/2012     Priority: Medium     Essential hypertension with goal blood pressure less than 140/90 02/20/2012     Priority: Medium     2/20/2012 off meds for years, had OK blood pressure. Today, 142/101. Will start on atenolol, as that had worked well before, and he needs NSAIDS, so will not use ACEI/ARB.       CARDIOVASCULAR SCREENING; LDL GOAL LESS THAN 130 10/31/2010     Priority: Medium     Idiopathic chronic gout of multiple sites without tophus 05/18/2010     Priority: Medium     ESOPHAGEAL REFLUX 12/20/2006     Priority: Medium     Current Outpatient Medications   Medication Sig Dispense Refill     allopurinol (ZYLOPRIM) 300 MG tablet Take 1 tablet (300 mg) by mouth daily 90 tablet 3     atenolol (TENORMIN) 50 MG tablet Take 1 tablet (50 mg) by mouth daily 90 tablet 3     empagliflozin (JARDIANCE) 10 MG TABS tablet Take 1 tablet (10 mg) by mouth daily 90 tablet 0     lisinopril (ZESTRIL) 30 MG tablet Take 1 tablet (30 mg) by mouth daily 90 tablet 3     omeprazole (PRILOSEC) 20 MG DR capsule Take 1 capsule (20 mg) by mouth daily 90 capsule 3      "sertraline (ZOLOFT) 50 MG tablet TAKE 1 TABLET DAILY 90 tablet 0     atorvastatin (LIPITOR) 20 MG tablet Take 1 tablet (20 mg) by mouth daily 30 tablet 2     blood glucose monitoring (NO BRAND SPECIFIED) meter device kit Use to test blood sugar 2 times daily  Preferred blood glucose meter OR supplies to accompany: Blood Glucose Monitor Brands: per insurance. 1 kit 0     Continuous Blood Gluc Sensor (FREESTYLE WOJCIECH 14 DAY SENSOR) MISC USE 1 SENSOR EVERY 14 DAYS 6 each 3     ONETOUCH ULTRA test strip USE TO TEST BLOOD SUGARS TWO TIMES A DAY 60 strip 0     thin (NO BRAND SPECIFIED) lancets Use with lanceting device. To accompany: Blood Glucose Monitor Brands: per insurance.  Test twice a day 60 each 6       OBJECTIVE:Blood pressure 138/86, pulse 70, temperature 98.8  F (37.1  C), temperature source Tympanic, resp. rate 16, height 1.854 m (6' 1\"), weight 121.1 kg (267 lb). BMI=Body mass index is 35.23 kg/m .  GENERAL APPEARANCE ADULT: Alert, no acute distress   Foot exam:no exam      "

## 2021-03-30 ASSESSMENT — ANXIETY QUESTIONNAIRES: GAD7 TOTAL SCORE: 3

## 2021-04-08 DIAGNOSIS — F41.9 ANXIETY: ICD-10-CM

## 2021-04-08 RX ORDER — VENLAFAXINE 75 MG/1
75 TABLET ORAL 3 TIMES DAILY
Qty: 270 TABLET | Refills: 3 | Status: SHIPPED | OUTPATIENT
Start: 2021-04-08 | End: 2022-11-01

## 2021-04-12 DIAGNOSIS — E78.5 HYPERLIPIDEMIA LDL GOAL <100: ICD-10-CM

## 2021-04-12 RX ORDER — ATORVASTATIN CALCIUM 20 MG/1
20 TABLET, FILM COATED ORAL DAILY
Qty: 30 TABLET | Refills: 0 | Status: SHIPPED | OUTPATIENT
Start: 2021-04-12 | End: 2021-12-01

## 2021-05-02 DIAGNOSIS — F41.9 ANXIETY: ICD-10-CM

## 2021-05-23 ENCOUNTER — HEALTH MAINTENANCE LETTER (OUTPATIENT)
Age: 46
End: 2021-05-23

## 2021-08-23 DIAGNOSIS — E11.9 TYPE 2 DIABETES MELLITUS WITHOUT COMPLICATION, WITHOUT LONG-TERM CURRENT USE OF INSULIN (H): ICD-10-CM

## 2021-08-27 ENCOUNTER — TELEPHONE (OUTPATIENT)
Dept: FAMILY MEDICINE | Facility: CLINIC | Age: 46
End: 2021-08-27

## 2021-08-27 NOTE — TELEPHONE ENCOUNTER
[facility]   Requesting further information from Provider  Form received back signed  8/23/21  Faxed Back & Sent to be scanned to this encounter  Kina Orn Petr Sec

## 2021-09-05 DIAGNOSIS — E79.0 HYPERURICEMIA: ICD-10-CM

## 2021-09-05 DIAGNOSIS — I10 ESSENTIAL HYPERTENSION WITH GOAL BLOOD PRESSURE LESS THAN 140/90: ICD-10-CM

## 2021-09-05 DIAGNOSIS — K21.9 GASTROESOPHAGEAL REFLUX DISEASE WITHOUT ESOPHAGITIS: ICD-10-CM

## 2021-09-08 RX ORDER — ALLOPURINOL 300 MG/1
TABLET ORAL
Qty: 90 TABLET | Refills: 3 | Status: SHIPPED | OUTPATIENT
Start: 2021-09-08 | End: 2022-09-07

## 2021-09-08 RX ORDER — ATENOLOL 50 MG/1
TABLET ORAL
Qty: 90 TABLET | Refills: 0 | Status: SHIPPED | OUTPATIENT
Start: 2021-09-08 | End: 2021-11-17

## 2021-09-08 NOTE — TELEPHONE ENCOUNTER
Prescription approved per [facility] Refill Protocol.  Eulalia MOJICA RN

## 2021-09-08 NOTE — TELEPHONE ENCOUNTER
"Attempted to contact patient , needs appointment. Unable to leave a message.  Eulalia MOJICA, RN  Requested Prescriptions   Pending Prescriptions Disp Refills    allopurinol (ZYLOPRIM) 300 MG tablet [Pharmacy Med Name: ALLOPURINOL TABS 300MG] 90 tablet 3     Sig: TAKE 1 TABLET DAILY        Gout Agents Protocol Failed - 9/5/2021  5:09 AM        Failed - CBC on file in past 12 months     Recent Labs   Lab Test 11/25/19 1936   WBC 11.3*   RBC 5.29   HGB 15.5   HCT 46.9                    Failed - ALT on file in past 12 months     Recent Labs   Lab Test 11/25/19 1936   ALT 99*             Failed - Has Uric Acid on file in past 12 months and value is less than 6     Recent Labs   Lab Test 11/25/19 1936   URIC 4.4     If level is 6mg/dL or greater, ok to refill one time and refer to provider.           Failed - Normal serum creatinine on file in the past 12 months     Recent Labs   Lab Test 08/26/20  1105   CR 0.77       Ok to refill medication if creatinine is low          Passed - Recent (12 mo) or future (30 days) visit within the authorizing provider's specialty     Patient has had an office visit with the authorizing provider or a provider within the authorizing providers department within the previous 12 mos or has a future within next 30 days. See \"Patient Info\" tab in inbasket, or \"Choose Columns\" in Meds & Orders section of the refill encounter.              Passed - Medication is active on med list        Passed - Patient is age 18 or older              "

## 2021-09-12 ENCOUNTER — HEALTH MAINTENANCE LETTER (OUTPATIENT)
Age: 46
End: 2021-09-12

## 2021-09-28 DIAGNOSIS — I10 ESSENTIAL HYPERTENSION WITH GOAL BLOOD PRESSURE LESS THAN 140/90: ICD-10-CM

## 2021-10-04 NOTE — TELEPHONE ENCOUNTER
"Attempted to reach patient, due for appt. Unable to leave message.  My chart message sent as well.   Eulalia MOJICA, RN    Requested Prescriptions   Pending Prescriptions Disp Refills     lisinopril (ZESTRIL) 30 MG tablet [Pharmacy Med Name: LISINOPRIL TABS 30MG] 90 tablet 3     Sig: TAKE 1 TABLET DAILY       ACE Inhibitors (Including Combos) Protocol Failed - 9/28/2021 11:18 PM        Failed - Normal serum creatinine on file in past 12 months     Recent Labs   Lab Test 08/26/20  1105   CR 0.77       Ok to refill medication if creatinine is low          Failed - Normal serum potassium on file in past 12 months     Recent Labs   Lab Test 08/26/20  1105   POTASSIUM 4.0             Passed - Blood pressure under 140/90 in past 12 months     BP Readings from Last 3 Encounters:   03/29/21 138/86   01/27/21 128/84   08/26/20 136/88                 Passed - Recent (12 mo) or future (30 days) visit within the authorizing provider's specialty     Patient has had an office visit with the authorizing provider or a provider within the authorizing providers department within the previous 12 mos or has a future within next 30 days. See \"Patient Info\" tab in inbasket, or \"Choose Columns\" in Meds & Orders section of the refill encounter.              Passed - Medication is active on med list        Passed - Patient is age 18 or older             "

## 2021-10-05 RX ORDER — LISINOPRIL 30 MG/1
TABLET ORAL
Qty: 90 TABLET | Refills: 3 | Status: SHIPPED | OUTPATIENT
Start: 2021-10-05 | End: 2022-09-01

## 2021-11-15 DIAGNOSIS — K21.9 GASTROESOPHAGEAL REFLUX DISEASE WITHOUT ESOPHAGITIS: ICD-10-CM

## 2021-11-15 DIAGNOSIS — I10 ESSENTIAL HYPERTENSION WITH GOAL BLOOD PRESSURE LESS THAN 140/90: ICD-10-CM

## 2021-11-17 RX ORDER — ATENOLOL 50 MG/1
TABLET ORAL
Qty: 90 TABLET | Refills: 3 | Status: SHIPPED | OUTPATIENT
Start: 2021-11-17 | End: 2022-11-16

## 2021-11-26 DIAGNOSIS — E11.9 TYPE 2 DIABETES MELLITUS WITHOUT COMPLICATION, WITHOUT LONG-TERM CURRENT USE OF INSULIN (H): ICD-10-CM

## 2021-11-26 DIAGNOSIS — E78.5 HYPERLIPIDEMIA LDL GOAL <100: ICD-10-CM

## 2021-11-30 NOTE — TELEPHONE ENCOUNTER
appt scheduled for 12/20/21 Dr. Acosta  Requesting medication to be filled until appt  Kina Orn Station Sec

## 2021-12-01 RX ORDER — EMPAGLIFLOZIN 25 MG/1
TABLET, FILM COATED ORAL
Qty: 30 TABLET | Refills: 0 | Status: SHIPPED | OUTPATIENT
Start: 2021-12-01 | End: 2022-01-25

## 2021-12-01 RX ORDER — ATORVASTATIN CALCIUM 20 MG/1
TABLET, FILM COATED ORAL
Qty: 30 TABLET | Refills: 0 | Status: SHIPPED | OUTPATIENT
Start: 2021-12-01 | End: 2022-02-08

## 2021-12-09 DIAGNOSIS — E11.9 TYPE 2 DIABETES MELLITUS WITHOUT COMPLICATION, WITHOUT LONG-TERM CURRENT USE OF INSULIN (H): ICD-10-CM

## 2021-12-13 RX ORDER — FLASH GLUCOSE SENSOR
KIT MISCELLANEOUS
Qty: 2 EACH | Refills: 3 | Status: SHIPPED | OUTPATIENT
Start: 2021-12-13 | End: 2022-03-09

## 2022-01-02 ENCOUNTER — HEALTH MAINTENANCE LETTER (OUTPATIENT)
Age: 47
End: 2022-01-02

## 2022-01-21 DIAGNOSIS — E11.9 TYPE 2 DIABETES MELLITUS WITHOUT COMPLICATION, WITHOUT LONG-TERM CURRENT USE OF INSULIN (H): ICD-10-CM

## 2022-01-25 RX ORDER — EMPAGLIFLOZIN 25 MG/1
TABLET, FILM COATED ORAL
Qty: 30 TABLET | Refills: 0 | Status: SHIPPED | OUTPATIENT
Start: 2022-01-25 | End: 2022-05-25

## 2022-02-01 LAB — RETINOPATHY: NORMAL

## 2022-02-04 ENCOUNTER — OFFICE VISIT (OUTPATIENT)
Dept: FAMILY MEDICINE | Facility: CLINIC | Age: 47
End: 2022-02-04
Payer: COMMERCIAL

## 2022-02-04 VITALS
WEIGHT: 266.8 LBS | DIASTOLIC BLOOD PRESSURE: 86 MMHG | TEMPERATURE: 97.8 F | BODY MASS INDEX: 35.36 KG/M2 | HEART RATE: 69 BPM | RESPIRATION RATE: 18 BRPM | HEIGHT: 73 IN | OXYGEN SATURATION: 95 % | SYSTOLIC BLOOD PRESSURE: 126 MMHG

## 2022-02-04 DIAGNOSIS — E78.5 HYPERLIPIDEMIA LDL GOAL <100: ICD-10-CM

## 2022-02-04 DIAGNOSIS — E11.22 TYPE 2 DIABETES MELLITUS WITH STAGE 1 CHRONIC KIDNEY DISEASE, WITHOUT LONG-TERM CURRENT USE OF INSULIN (H): Primary | ICD-10-CM

## 2022-02-04 DIAGNOSIS — E66.01 CLASS 2 SEVERE OBESITY WITH SERIOUS COMORBIDITY AND BODY MASS INDEX (BMI) OF 35.0 TO 35.9 IN ADULT, UNSPECIFIED OBESITY TYPE (H): ICD-10-CM

## 2022-02-04 DIAGNOSIS — R74.8 ELEVATED LIVER ENZYMES: ICD-10-CM

## 2022-02-04 DIAGNOSIS — Z12.11 SCREEN FOR COLON CANCER: ICD-10-CM

## 2022-02-04 DIAGNOSIS — N18.1 TYPE 2 DIABETES MELLITUS WITH STAGE 1 CHRONIC KIDNEY DISEASE, WITHOUT LONG-TERM CURRENT USE OF INSULIN (H): Primary | ICD-10-CM

## 2022-02-04 DIAGNOSIS — E66.812 CLASS 2 SEVERE OBESITY WITH SERIOUS COMORBIDITY AND BODY MASS INDEX (BMI) OF 35.0 TO 35.9 IN ADULT, UNSPECIFIED OBESITY TYPE (H): ICD-10-CM

## 2022-02-04 DIAGNOSIS — N52.9 ERECTILE DYSFUNCTION, UNSPECIFIED ERECTILE DYSFUNCTION TYPE: ICD-10-CM

## 2022-02-04 DIAGNOSIS — I10 ESSENTIAL HYPERTENSION WITH GOAL BLOOD PRESSURE LESS THAN 140/90: ICD-10-CM

## 2022-02-04 DIAGNOSIS — Z11.59 NEED FOR HEPATITIS C SCREENING TEST: ICD-10-CM

## 2022-02-04 LAB
ALBUMIN SERPL-MCNC: 4.1 G/DL (ref 3.4–5)
ALP SERPL-CCNC: 75 U/L (ref 40–150)
ALT SERPL W P-5'-P-CCNC: 92 U/L (ref 0–70)
ANION GAP SERPL CALCULATED.3IONS-SCNC: 7 MMOL/L (ref 3–14)
AST SERPL W P-5'-P-CCNC: 43 U/L (ref 0–45)
BILIRUB SERPL-MCNC: 0.5 MG/DL (ref 0.2–1.3)
BUN SERPL-MCNC: 20 MG/DL (ref 7–30)
CALCIUM SERPL-MCNC: 9.2 MG/DL (ref 8.5–10.1)
CHLORIDE BLD-SCNC: 102 MMOL/L (ref 94–109)
CHOLEST SERPL-MCNC: 211 MG/DL
CO2 SERPL-SCNC: 27 MMOL/L (ref 20–32)
CREAT SERPL-MCNC: 0.79 MG/DL (ref 0.66–1.25)
CREAT UR-MCNC: 116 MG/DL
GFR SERPL CREATININE-BSD FRML MDRD: >90 ML/MIN/1.73M2
GLUCOSE BLD-MCNC: 141 MG/DL (ref 70–99)
HBA1C MFR BLD: 7.8 % (ref 0–5.6)
HDLC SERPL-MCNC: 42 MG/DL
HOLD SPECIMEN: NORMAL
LDLC SERPL CALC-MCNC: 118 MG/DL
MICROALBUMIN UR-MCNC: 86 MG/L
MICROALBUMIN/CREAT UR: 74.14 MG/G CR (ref 0–17)
NONHDLC SERPL-MCNC: 169 MG/DL
POTASSIUM BLD-SCNC: 4 MMOL/L (ref 3.4–5.3)
PROT SERPL-MCNC: 8.1 G/DL (ref 6.8–8.8)
SODIUM SERPL-SCNC: 136 MMOL/L (ref 133–144)
TRIGL SERPL-MCNC: 254 MG/DL

## 2022-02-04 PROCEDURE — 80061 LIPID PANEL: CPT | Performed by: FAMILY MEDICINE

## 2022-02-04 PROCEDURE — 82043 UR ALBUMIN QUANTITATIVE: CPT | Performed by: FAMILY MEDICINE

## 2022-02-04 PROCEDURE — 87340 HEPATITIS B SURFACE AG IA: CPT | Performed by: FAMILY MEDICINE

## 2022-02-04 PROCEDURE — 36415 COLL VENOUS BLD VENIPUNCTURE: CPT | Performed by: FAMILY MEDICINE

## 2022-02-04 PROCEDURE — 83036 HEMOGLOBIN GLYCOSYLATED A1C: CPT | Performed by: FAMILY MEDICINE

## 2022-02-04 PROCEDURE — 86704 HEP B CORE ANTIBODY TOTAL: CPT | Performed by: FAMILY MEDICINE

## 2022-02-04 PROCEDURE — 80053 COMPREHEN METABOLIC PANEL: CPT | Performed by: FAMILY MEDICINE

## 2022-02-04 PROCEDURE — 86803 HEPATITIS C AB TEST: CPT | Performed by: FAMILY MEDICINE

## 2022-02-04 PROCEDURE — 86706 HEP B SURFACE ANTIBODY: CPT | Performed by: FAMILY MEDICINE

## 2022-02-04 PROCEDURE — 83550 IRON BINDING TEST: CPT | Performed by: FAMILY MEDICINE

## 2022-02-04 PROCEDURE — 99214 OFFICE O/P EST MOD 30 MIN: CPT | Performed by: FAMILY MEDICINE

## 2022-02-04 PROCEDURE — 84443 ASSAY THYROID STIM HORMONE: CPT | Performed by: FAMILY MEDICINE

## 2022-02-04 RX ORDER — SILDENAFIL 50 MG/1
50 TABLET, FILM COATED ORAL DAILY PRN
Qty: 18 TABLET | Refills: 3 | Status: SHIPPED | OUTPATIENT
Start: 2022-02-04 | End: 2023-11-30

## 2022-02-04 ASSESSMENT — PAIN SCALES - GENERAL: PAINLEVEL: NO PAIN (0)

## 2022-02-04 ASSESSMENT — MIFFLIN-ST. JEOR: SCORE: 2148.95

## 2022-02-04 NOTE — LETTER
"February 8, 2022      Bon Perkins  [address] [facility] [address]        Dear ,    We are writing to inform you of your test results.    Hepatitis C test is negative.    Urine tests show positive MAC (microalbumin/creatinine ratio) meaning protein leaking from the kidneys into the urine. This is a sign of some kidney damage from the diabetes mellitus.  It has been present in the past.   The blood chemistries (Basic metabolic panel) are all normal including electrolytes (salt balances in the blood) and kidney tests.   Glucose (blood sugar) is high.  ALT liver test is high similar to previous. This indicates some ongoing liver inflammation.   Triglycerides, a type of fat found in the bloodstream is high.  LDL (\"bad cholesterol\") is high.  This increases risk for cardiovascular disease (heart attacks and strokes).  Hgb A1C has improved from a year ago. It is still higher that I would like to see.   PLAN:   Losing weight and regular exercise can help the diabetes mellitus.   I suggest adding another medication for the diabetes mellitus. We can try a newer medication that helps for weight.  It can be expensive.  We will see if your insurance covers.   Start semaglutide (Ozempic)   3mg daily for a month, then increase to 7mg daily.   REcheck in 3 months.      I recommend a few tests to see why liver is irritated.  Might be fatty liver.    I will add additional tests to recent blood sample  Schedule an appointment with diagnostics for a liver ultrasound to check liver structure.   Call [phone] to schedule.     Resulted Orders   Comprehensive metabolic panel (BMP + Alb, Alk Phos, ALT, AST, Total. Bili, TP)   Result Value Ref Range    Sodium 136 133 - 144 mmol/L    Potassium 4.0 3.4 - 5.3 mmol/L    Chloride 102 94 - 109 mmol/L    Carbon Dioxide (CO2) 27 20 - 32 mmol/L    Anion Gap 7 3 - 14 mmol/L    Urea Nitrogen 20 7 - 30 mg/dL    Creatinine 0.79 0.66 - 1.25 mg/dL    Calcium 9.2 8.5 - 10.1 mg/dL    " Glucose 141 (H) 70 - 99 mg/dL    Alkaline Phosphatase 75 40 - 150 U/L    AST 43 0 - 45 U/L    ALT 92 (H) 0 - 70 U/L    Protein Total 8.1 6.8 - 8.8 g/dL    Albumin 4.1 3.4 - 5.0 g/dL    Bilirubin Total 0.5 0.2 - 1.3 mg/dL    GFR Estimate >90 >60 mL/min/1.73m2      Comment:      Effective December 21, 2021 eGFRcr in adults is calculated using the 2021 CKD-EPI creatinine equation which includes age and gender (Jossue et al., NEJ, DOI: 10.1056/TEHMle0179412)   HEMOGLOBIN A1C   Result Value Ref Range    Hemoglobin A1C 7.8 (H) 0.0 - 5.6 %      Comment:      Normal <5.7%   Prediabetes 5.7-6.4%    Diabetes 6.5% or higher     Note: Adopted from ADA consensus guidelines.   Lipid panel reflex to direct LDL Fasting   Result Value Ref Range    Cholesterol 211 (H) <200 mg/dL    Triglycerides 254 (H) <150 mg/dL    Direct Measure HDL 42 >=40 mg/dL    LDL Cholesterol Calculated 118 (H) <=100 mg/dL    Non HDL Cholesterol 169 (H) <130 mg/dL    Narrative    Cholesterol  Desirable:  <200 mg/dL    Triglycerides  Normal:  Less than 150 mg/dL  Borderline High:  150-199 mg/dL  High:  200-499 mg/dL  Very High:  Greater than or equal to 500 mg/dL    Direct Measure HDL  Female:  Greater than or equal to 50 mg/dL   Male:  Greater than or equal to 40 mg/dL    LDL Cholesterol  Desirable:  <100mg/dL  Above Desirable:  100-129 mg/dL   Borderline High:  130-159 mg/dL   High:  160-189 mg/dL   Very High:  >= 190 mg/dL    Non HDL Cholesterol  Desirable:  130 mg/dL  Above Desirable:  130-159 mg/dL  Borderline High:  160-189 mg/dL  High:  190-219 mg/dL  Very High:  Greater than or equal to 220 mg/dL   Albumin Random Urine Quantitative with Creat Ratio   Result Value Ref Range    Creatinine Urine mg/dL 116 mg/dL    Albumin Urine mg/L 86 mg/L    Albumin Urine mg/g Cr 74.14 (H) 0.00 - 17.00 mg/g Cr   Hepatitis C Screen Reflex to HCV RNA Quant and Genotype   Result Value Ref Range    Hepatitis C Antibody Nonreactive Nonreactive    Narrative    Assay  performance characteristics have not been established for newborns, infants, and children.       If you have any questions or concerns, please call the clinic at the number listed above.       Sincerely,      Justin Acosta MD/ ss

## 2022-02-04 NOTE — PROGRESS NOTES
ASSESSMENT/PLAN:                                                    Lifestyle recommendations:regular exercise, at least 150 minutes per week (average 30 minutes 5 times a week)  keep working on losing weight (ideal BMI-body mass index is <25)  Diabetic recommendations:-return for follow-up visit in 6 month(s) if Hgb A1C is oK or 3 months if changes made.      (E11.22,  N18.1) Type 2 diabetes mellitus with stage 1 chronic kidney disease, without long-term current use of insulin (H)  (primary encounter diagnosis)  Comment: due for follow-up   Plan: HEMOGLOBIN A1C, Albumin Random Urine         Quantitative with Creat Ratio, Comprehensive         metabolic panel (BMP + Alb, Alk Phos, ALT, AST,        Total. Bili, TP)        Hgb A1C pending.    If Hgb A1C is above 7, I will recommend adding another medication.    Take 81mg aspirin daily.     (I10) Essential hypertension with goal blood pressure less than 140/90  Comment: looks good.  Plan: No change in current treatment plan.      (E78.5) Hyperlipidemia LDL goal <100  Comment: due for follow-up   Plan: Fasting blood tests today.  No change in current treatment plan.     (N52.9) Erectile dysfunction, unspecified erectile dysfunction type  Comment: new problem  Plan: sildenafil (VIAGRA) 50 MG tablet        Try Viagra (sildenafil) for erectile dysfunction.  There are several different strengths of the Viaga pills.  Start with Viagra 50mg pills 30min to 4 hours before planned intercourse.  If there is food in your stomach, it may delay absorption of the medication so it takes a little longer for the Viagra to become effective.   You could try 1/2 pill (25mg) if you have side effects on the 50mg or try 100 mg (2 pills) if the 50mg pills do not help with the erections. Taking more than 100mg will not help-don't take more than 100mg.    Potential side effects including flushing, headache, and visual symptoms.    There is an interaction of Viagra with nitro medications (used  for heart disease and angina).   Do not take these two medications within 24 hours of each other, there can be serious consequences.       Sidenafil (Viagra) comes in 20mg strength pills. This is an alternative to Viagra if it is too expensive.  It is typically not covered by insurance.    Take as many pills as needed up to maximum of 5 pills at a time prior to intercourse.   They are about $1 a pill. You can try 2 or 3 at a time initially.  Take at least 30 minutes before planned sexual intercourse initially.   Effects of medication will last up to about 4 hours.  Take up to 5 pills total if needed.  Taking more than 5 will not help erectons and will increase chances of side effects.      (E66.01,  Z68.35) Class 2 severe obesity with serious comorbidity and body mass index (BMI) of 35.0 to 35.9 in adult, unspecified obesity type (H)  Comment: weight loss can be one of the best things to help diabetes mellitus     (Z12.11) Screen for colon cancer  Comment:   Plan: Fecal colorectal cancer screen (FIT)        Complete FIT colon cancer screening test and send in the mail.      (Z11.59) Need for hepatitis C screening test  Comment:   Plan: Hepatitis C Screen Reflex to HCV RNA Quant and         Genotype      Diabetes complications/Comorbid conditions:  Nephropathy:ckd     Retinopathy: no  Neuropathy: no  Vascular disease: no  High/low problems: no  Tobacco use: no    Treatments:  Statin: atorvastatin   Exercise: no  ACE: lisinopril    ASA: no        Subjective   Bon is a 46 year old who presents for the following health issues   No chief complaint on file.     He has been feeling well.   In for  diabetes mellitus follow-up.   Erectile problems.  Can get erections.  Takes longer to get an erection. Sometimes loses steam.  Can reach orgasm.      Diabetes Follow-up    How often are you checking your blood sugar? Continuous glucose monitor  Diabetes medications currently taking: empagliflozin (Jardiance) 25mg,  WQC=102.   Checking twice daily usually. Glucometer range within the past month=130-300    Did  Not tolerate metformin for diarrhea.   What time of day are you checking your blood sugars (select all that apply)?  After meals  Have you had any blood sugars above 200?  Yes   Have you had any blood sugars below 70?  No    What symptoms do you notice when your blood sugar is low?  None    What concerns do you have today about your diabetes? None     Do you have any of these symptoms? (Select all that apply)  No numbness or tingling in feet.  No redness, sores or blisters on feet.  No complaints of excessive thirst.  No reports of blurry vision.  No significant changes to weight.    Have you had a diabetic eye exam in the last 12 months? Yes-  Location: 2021    BP Readings from Last 2 Encounters:   03/29/21 138/86   01/27/21 128/84     Hemoglobin A1C POCT (%)   Date Value   01/27/2021 10.6 (H)   08/26/2020 7.7 (H)     LDL Cholesterol Calculated (mg/dL)   Date Value   01/27/2021 101 (H)   07/26/2018 97         Last clinic visit:3/29/21  Medication or other changes at last visit:increased empagliflozin (Jardiance)   Weight since last visit?  Down 1#  Wt Readings from Last 5 Encounters:   02/04/22 121 kg (266 lb 12.8 oz)   03/29/21 121.1 kg (267 lb)   01/27/21 120.7 kg (266 lb)   08/26/20 124.3 kg (274 lb)   12/19/19 121.1 kg (267 lb)      History   Smoking Status     Never Smoker   Smokeless Tobacco     Former User     Types: Chew     Comment: quit in July 2006       Past medical history reviewed and updated    Patient Active Problem List    Diagnosis Date Noted     Hyperlipidemia LDL goal <100 01/28/2021     Priority: Medium     CKD (chronic kidney disease) stage 1, GFR 90 ml/min or greater 01/28/2021     Priority: Medium     Anxiety 08/31/2020     Priority: Medium     Microalbuminuria 08/27/2020     Priority: Medium     Type 2 diabetes mellitus with stage 1 chronic kidney disease, without long-term current use of insulin (H)  04/22/2020     Priority: Medium     November 2019 diagnosis.    He had diarrhea from immediate release and XR metformin.   Unable to tolerate.       Class 2 severe obesity with serious comorbidity and body mass index (BMI) of 35.0 to 35.9 in adult (H) 07/26/2018     Priority: Medium     Elevated liver enzymes 07/26/2018     Priority: Medium     Preseptal cellulitis 07/02/2012     Priority: Medium     Essential hypertension with goal blood pressure less than 140/90 02/20/2012     Priority: Medium     2/20/2012 off meds for years, had OK blood pressure. Today, 142/101. Will start on atenolol, as that had worked well before, and he needs NSAIDS, so will not use ACEI/ARB.       CARDIOVASCULAR SCREENING; LDL GOAL LESS THAN 130 10/31/2010     Priority: Medium     Idiopathic chronic gout of multiple sites without tophus 05/18/2010     Priority: Medium     ESOPHAGEAL REFLUX 12/20/2006     Priority: Medium     Current Outpatient Medications   Medication Sig Dispense Refill     aspirin (ASA) 81 MG chewable tablet Take 1 tablet (81 mg) by mouth daily 30 tablet 11     atenolol (TENORMIN) 50 MG tablet TAKE 1 TABLET DAILY 90 tablet 3     blood glucose (ONETOUCH ULTRA) test strip 100 strips by In Vitro route daily Use to test blood sugar 1 times daily or as directed. 100 strip 0     blood glucose monitoring (NO BRAND SPECIFIED) meter device kit Use to test blood sugar 2 times daily  Preferred blood glucose meter OR supplies to accompany: Blood Glucose Monitor Brands: per insurance. 1 kit 0     Continuous Blood Gluc Sensor (FREESTYLE WOJCIECH 14 DAY SENSOR) MISC USE 1 SENSOR EVERY 14 DAYS 2 each 3     JARDIANCE 25 MG TABS tablet TAKE 1 TABLET DAILY (MUST HAVE LAB AND APPOINTMENT) 30 tablet 0     lisinopril (ZESTRIL) 30 MG tablet TAKE 1 TABLET DAILY 90 tablet 3     omeprazole (PRILOSEC) 20 MG DR capsule TAKE 1 CAPSULE DAILY 90 capsule 3     thin (NO BRAND SPECIFIED) lancets Use with lanceting device. To accompany: Blood Glucose Monitor  "Brands: per insurance.  Test twice a day 60 each 6     venlafaxine (EFFEXOR) 75 MG tablet Take 1 tablet (75 mg) by mouth 3 times daily 270 tablet 3     allopurinol (ZYLOPRIM) 300 MG tablet TAKE 1 TABLET DAILY (Patient not taking: Reported on 2/4/2022) 90 tablet 3     atorvastatin (LIPITOR) 20 MG tablet TAKE 1 TABLET DAILY (Patient not taking: Reported on 2/4/2022) 30 tablet 0     ROS:General: No change in weight, sleep or appetite.  Normal energy.  No fever or chills  Resp: No coughing, wheezing or shortness of breath  CV: No chest pains or palpitations  GI: No nausea, vomiting,  heartburn, abdominal pain, diarrhea, constipation or change in bowel habits  : No urinary frequency or dysuria, bladder or kidney problems  Musculoskeletal: POSITIVE  for:, pain low back pain   Psychiatric: No problems with anxiety, depression or mental health    OBJECTIVE:Blood pressure 126/86, pulse 69, temperature 97.8  F (36.6  C), temperature source Tympanic, resp. rate 18, height 1.854 m (6' 0.99\"), weight 121 kg (266 lb 12.8 oz), SpO2 95 %. BMI=Body mass index is 35.21 kg/m .  GENERAL APPEARANCE ADULT: Alert, no acute distress, obese  NECK: No adenopathy,masses or thyromegaly  RESP: lungs clear to auscultation   CV: normal rate, regular rhythm, no murmur or gallop  ABDOMEN: soft, no organomegaly, masses or tenderness  MS: extremities normal, no peripheral edema   Foot exam:normal DP and PT pulses, no trophic changes or ulcerative lesions and normal monofilament exam    "

## 2022-02-04 NOTE — PATIENT INSTRUCTIONS
ASSESSMENT/PLAN:                                                    Lifestyle recommendations:regular exercise, at least 150 minutes per week (average 30 minutes 5 times a week)  keep working on losing weight (ideal BMI-body mass index is <25)  Diabetic recommendations:-return for follow-up visit in 6 month(s) if Hgb A1C is oK or 3 months if changes made.      (E11.22,  N18.1) Type 2 diabetes mellitus with stage 1 chronic kidney disease, without long-term current use of insulin (H)  (primary encounter diagnosis)  Comment: due for follow-up   Plan: HEMOGLOBIN A1C, Albumin Random Urine         Quantitative with Creat Ratio, Comprehensive         metabolic panel (BMP + Alb, Alk Phos, ALT, AST,        Total. Bili, TP)        Hgb A1C pending.    If Hgb A1C is above 7, I will recommend adding another medication.      (I10) Essential hypertension with goal blood pressure less than 140/90  Comment: looks good.  Plan: No change in current treatment plan.      (E78.5) Hyperlipidemia LDL goal <100  Comment: due for follow-up   Plan: Fasting blood tests today.  No change in current treatment plan.     (N52.9) Erectile dysfunction, unspecified erectile dysfunction type  Comment: new problem  Plan: sildenafil (VIAGRA) 50 MG tablet        Try Viagra (sildenafil) for erectile dysfunction.  There are several different strengths of the Viaga pills.  Start with Viagra 50mg pills 30min to 4 hours before planned intercourse.  If there is food in your stomach, it may delay absorption of the medication so it takes a little longer for the Viagra to become effective.   You could try 1/2 pill (25mg) if you have side effects on the 50mg or try 100 mg (2 pills) if the 50mg pills do not help with the erections. Taking more than 100mg will not help-don't take more than 100mg.    Potential side effects including flushing, headache, and visual symptoms.    There is an interaction of Viagra with nitro medications (used for heart disease and  angina).   Do not take these two medications within 24 hours of each other, there can be serious consequences.       Sidenafil (Viagra) comes in 20mg strength pills. This is an alternative to Viagra if it is too expensive.  It is typically not covered by insurance.    Take as many pills as needed up to maximum of 5 pills at a time prior to intercourse.   They are about $1 a pill. You can try 2 or 3 at a time initially.  Take at least 30 minutes before planned sexual intercourse initially.   Effects of medication will last up to about 4 hours.  Take up to 5 pills total if needed.  Taking more than 5 will not help erectons and will increase chances of side effects.      (E66.01,  Z68.35) Class 2 severe obesity with serious comorbidity and body mass index (BMI) of 35.0 to 35.9 in adult, unspecified obesity type (H)  Comment: weight loss can be one of the best things to help diabetes mellitus     (Z12.11) Screen for colon cancer  Comment:   Plan: Fecal colorectal cancer screen (FIT)        Complete FIT colon cancer screening test and send in the mail.      (Z11.59) Need for hepatitis C screening test  Comment:   Plan: Hepatitis C Screen Reflex to HCV RNA Quant and         Genotype      Diabetes complications/Comorbid conditions:  Nephropathy:ckd     Retinopathy: no  Neuropathy: no  Vascular disease: no  High/low problems: no  Tobacco use: no    Treatments:  Statin: atorvastatin   Exercise: no  ACE: lisinopril    ASA: no

## 2022-02-07 LAB — HCV AB SERPL QL IA: NONREACTIVE

## 2022-02-08 DIAGNOSIS — E11.22 TYPE 2 DIABETES MELLITUS WITH STAGE 1 CHRONIC KIDNEY DISEASE, WITHOUT LONG-TERM CURRENT USE OF INSULIN (H): ICD-10-CM

## 2022-02-08 DIAGNOSIS — N18.1 TYPE 2 DIABETES MELLITUS WITH STAGE 1 CHRONIC KIDNEY DISEASE, WITHOUT LONG-TERM CURRENT USE OF INSULIN (H): ICD-10-CM

## 2022-02-08 DIAGNOSIS — R74.8 ELEVATED LIVER ENZYMES: Primary | ICD-10-CM

## 2022-02-08 DIAGNOSIS — E78.5 HYPERLIPIDEMIA LDL GOAL <100: ICD-10-CM

## 2022-02-08 LAB
IRON SATN MFR SERPL: 27 % (ref 15–46)
IRON SERPL-MCNC: 109 UG/DL (ref 35–180)
TIBC SERPL-MCNC: 408 UG/DL (ref 240–430)
TSH SERPL DL<=0.005 MIU/L-ACNC: 1.57 MU/L (ref 0.4–4)

## 2022-02-08 RX ORDER — ATORVASTATIN CALCIUM 20 MG/1
20 TABLET, FILM COATED ORAL DAILY
Qty: 90 TABLET | Refills: 3 | Status: SHIPPED | OUTPATIENT
Start: 2022-02-08 | End: 2022-12-23

## 2022-02-08 NOTE — RESULT ENCOUNTER NOTE
"Please call to see if he has questions.     Hi Bon,   Hepatitis C test is negative.    Urine tests show positive MAC (microalbumin/creatinine ratio) meaning protein leaking from the kidneys into the urine. This is a sign of some kidney damage from the diabetes mellitus.  It has been present in the past.   The blood chemistries (Basic metabolic panel) are all normal including electrolytes (salt balances in the blood) and kidney tests.   Glucose (blood sugar) is high.  ALT liver test is high similar to previous. This indicates some ongoing liver inflammation.   Triglycerides, a type of fat found in the bloodstream is high.  LDL (\"bad cholesterol\") is high.  This increases risk for cardiovascular disease (heart attacks and strokes).  Hgb A1C has improved from a year ago. It is still higher that I would like to see.   PLAN:   Losing weight and regular exercise can help the diabetes mellitus.   I suggest adding another medication for the diabetes mellitus. We can try a newer medication that helps for weight.  It can be expensive.  We will see if your insurance covers.   Start semaglutide (Ozempic)   3mg daily for a month, then increase to 7mg daily.   REcheck in 3 months.     I recommend a few tests to see why liver is irritated.  Might be fatty liver.    I will add additional tests to recent blood sample  Schedule an appointment with diagnostics for a liver ultrasound to check liver structure.   Call [phone] to schedule.      Restart the atorvastatin.  prescription sent.     TANYA ANDRADE MD   "

## 2022-02-09 LAB
HBV SURFACE AB SERPL IA-ACNC: 51.41 M[IU]/ML
HBV SURFACE AG SERPL QL IA: NONREACTIVE

## 2022-02-10 LAB — HBV CORE AB SERPL QL IA: REACTIVE

## 2022-02-10 NOTE — RESULT ENCOUNTER NOTE
Giovanni rCockett,   Your tests for hepatitis B indicate a past infection or immunization.  There is no sign of current infection.  TSH is normal indicating that the level of thyroid hormone is in the normal range.   Hepatitis C test is negative.    Iron tests are normal.  TANYA ANDRADE MD

## 2022-02-17 ENCOUNTER — HOSPITAL ENCOUNTER (OUTPATIENT)
Dept: ULTRASOUND IMAGING | Facility: CLINIC | Age: 47
Discharge: HOME | End: 2022-02-17
Attending: FAMILY MEDICINE | Admitting: FAMILY MEDICINE
Payer: COMMERCIAL

## 2022-02-17 DIAGNOSIS — R74.8 ELEVATED LIVER ENZYMES: ICD-10-CM

## 2022-02-17 PROCEDURE — 76705 ECHO EXAM OF ABDOMEN: CPT

## 2022-02-17 NOTE — RESULT ENCOUNTER NOTE
Giovanni Crockett,  Your abdominal ultrasound showed an enlarged liver with a lot of fatty infiltration.  Gallbladder and other organs looked okay.  I think your liver tests are abnormal because of the fatty liver.  This can lead to liver damage over the years of time.  The best treatment is losing weight.  Let me know if you have additional questions.  TANYA ANDRADE MD

## 2022-03-04 DIAGNOSIS — E11.9 TYPE 2 DIABETES MELLITUS WITHOUT COMPLICATION, WITHOUT LONG-TERM CURRENT USE OF INSULIN (H): ICD-10-CM

## 2022-03-09 RX ORDER — FLASH GLUCOSE SENSOR
KIT MISCELLANEOUS
Qty: 1 EACH | Refills: 12 | Status: SHIPPED | OUTPATIENT
Start: 2022-03-09 | End: 2022-06-16

## 2022-03-09 NOTE — TELEPHONE ENCOUNTER
Routing refill request to provider for review/approval because:  Drug not on the [facility] refill protocol     Tamara Nazario RN BSN

## 2022-05-25 DIAGNOSIS — E11.9 TYPE 2 DIABETES MELLITUS WITHOUT COMPLICATION, WITHOUT LONG-TERM CURRENT USE OF INSULIN (H): ICD-10-CM

## 2022-05-25 RX ORDER — EMPAGLIFLOZIN 25 MG/1
TABLET, FILM COATED ORAL
Qty: 90 TABLET | Refills: 3 | Status: SHIPPED | OUTPATIENT
Start: 2022-05-25 | End: 2022-08-23

## 2022-06-01 ENCOUNTER — TRANSFERRED RECORDS (OUTPATIENT)
Dept: MULTI SPECIALTY CLINIC | Facility: CLINIC | Age: 47
End: 2022-06-01

## 2022-06-01 LAB — RETINOPATHY: NORMAL

## 2022-06-16 DIAGNOSIS — E11.9 TYPE 2 DIABETES MELLITUS WITHOUT COMPLICATION, WITHOUT LONG-TERM CURRENT USE OF INSULIN (H): ICD-10-CM

## 2022-06-16 NOTE — TELEPHONE ENCOUNTER
Reason for Call:  Medication or medication refill:  WOJCIECH FREESTYLE 14 DAY SENSOR   Do you use a [facility]?  Name of the pharmacy and phone number for the current request:  [facility]  Name of the medication requested: FREESTYLE WOJCIECH 14 DAY SENSOR      Other request: please re write rx so it will be good for 90 days instead of 30  Can we leave a detailed message on this number? YES    Phone number patient can be reached at: Home number on file [phone] (home)    Best Time: ANY    Call taken on 6/16/2022 at 5:12 PM by Kenyetta Bonilla       Detail Level: Zone

## 2022-06-17 RX ORDER — FLASH GLUCOSE SENSOR
KIT MISCELLANEOUS
Qty: 1 EACH | Refills: 12 | Status: SHIPPED | OUTPATIENT
Start: 2022-06-17 | End: 2022-06-28

## 2022-06-17 NOTE — TELEPHONE ENCOUNTER
Routing refill request to provider for review/approval because:  Drug not on the [facility] refill protocol     Pending Prescriptions:                       Disp   Refills    Continuous Blood Gluc Sensor (FREESTYLE LI*1 each 12       Sig: Change every 14 days.    Eulalai Garg RN on 6/17/2022 at 12:06 PM

## 2022-06-19 ENCOUNTER — HEALTH MAINTENANCE LETTER (OUTPATIENT)
Age: 47
End: 2022-06-19

## 2022-06-24 NOTE — TELEPHONE ENCOUNTER
Reason for Call:  Other    Detailed comments: Pt's wife calling in regards to below prescription of the Sheree Freestyle sensor. They have only been getting 1 box every 2 weeks. They talked to pharmacy and said the quantity can be changed from 1 to 6 so 6 boxes can be sent at once. Wondering if this is possible.    Phone Number Patient can be reached at: Home number on file [phone] (home)    Best Time: anytime    Can we leave a detailed message on this number? YES    Call taken on 6/24/2022 at 12:09 PM by Roselyn Vigil

## 2022-06-28 RX ORDER — FLASH GLUCOSE SENSOR
KIT MISCELLANEOUS
Qty: 6 EACH | Refills: 1 | Status: SHIPPED | OUTPATIENT
Start: 2022-06-28 | End: 2022-11-28

## 2022-08-10 ENCOUNTER — TELEPHONE (OUTPATIENT)
Dept: FAMILY MEDICINE | Facility: CLINIC | Age: 47
End: 2022-08-10

## 2022-08-10 NOTE — TELEPHONE ENCOUNTER
PA Initiation    Medication: Jardiance 25MG tablets  Insurance Company: [employer] [facility] - Phone [phone] Fax [phone]  Pharmacy Filling the Rx: [facility] - [address]  Filling Pharmacy Phone: [phone]  Filling Pharmacy Fax: [phone]  Start Date: 8/10/2022    JEFF COE Darling: [id number]

## 2022-08-10 NOTE — TELEPHONE ENCOUNTER
Prior Authorization Retail Medication Request    Medication/Dose: Jardiance  ICD code (if different than what is on RX):    Previously Tried and Failed:    Rationale:      Key: [id number]      Pharmacy Information (if different than what is on RX)  Name:  [facility]

## 2022-08-15 NOTE — TELEPHONE ENCOUNTER
Prior Authorization Not Needed per Insurance    Medication: Jardiance 25MG tablets  Insurance Company: [employer] - Phone [phone] Fax [phone]  Expected CoPay:      Pharmacy Filling the Rx: [facility] - [address]  Pharmacy Notified: Yes  Patient Notified: No    Drug is covered by current benefit plan. No further PA activity needed  No PA needed per rep.

## 2022-08-22 DIAGNOSIS — E11.9 TYPE 2 DIABETES MELLITUS WITHOUT COMPLICATION, WITHOUT LONG-TERM CURRENT USE OF INSULIN (H): ICD-10-CM

## 2022-08-26 ENCOUNTER — APPOINTMENT (OUTPATIENT)
Dept: LAB | Facility: CLINIC | Age: 47
End: 2022-08-26
Payer: COMMERCIAL

## 2022-08-26 PROCEDURE — 82274 ASSAY TEST FOR BLOOD FECAL: CPT

## 2022-08-31 DIAGNOSIS — Z12.11 SCREEN FOR COLON CANCER: ICD-10-CM

## 2022-08-31 DIAGNOSIS — R19.5 POSITIVE FIT (FECAL IMMUNOCHEMICAL TEST): Primary | ICD-10-CM

## 2022-08-31 LAB — HEMOCCULT STL QL IA: POSITIVE

## 2022-08-31 NOTE — RESULT ENCOUNTER NOTE
Please contact him.   Needs info on scheduling colonoscopy.     Hi Bon,  Your FIT stool test screening for colon cancer was positive.  This can sometimes be a sign of colon cancer.  Sometimes it is falsely positive because of bleeding from another cause in the intestinal tract.  Due to the potential that this could be a cancer, I recommend that you have a colonoscopy so that we know for sure about your colon.  I will have my staff contact you and we will get you information to get a colonoscopy scheduled.  Please let me know if you have additional questions.  TANYA ANDRADE MD

## 2022-09-01 DIAGNOSIS — I10 ESSENTIAL HYPERTENSION WITH GOAL BLOOD PRESSURE LESS THAN 140/90: ICD-10-CM

## 2022-09-01 RX ORDER — LISINOPRIL 30 MG/1
TABLET ORAL
Qty: 90 TABLET | Refills: 1 | Status: SHIPPED | OUTPATIENT
Start: 2022-09-01 | End: 2023-02-28

## 2022-09-02 ENCOUNTER — ANESTHESIA EVENT (OUTPATIENT)
Dept: GASTROENTEROLOGY | Facility: CLINIC | Age: 47
End: 2022-09-02
Payer: COMMERCIAL

## 2022-09-02 NOTE — ANESTHESIA PREPROCEDURE EVALUATION
Anesthesia Pre-Procedure Evaluation    Patient: Bon Perkins   MRN: [id number] : 1975        Procedure : Procedure(s):  COLONOSCOPY          Past Medical History:   Diagnosis Date     Arthritis     gout      Past Surgical History:   Procedure Laterality Date     ARTHROSCOPY KNEE RT/LT      right knee     ORTHOPEDIC SURGERY      R hand fracture- open reduction.      No Known Allergies   Social History     Tobacco Use     Smoking status: Never Smoker     Smokeless tobacco: Former User     Types: Chew     Tobacco comment: quit in 2006   Substance Use Topics     Alcohol use: Yes     Comment: daily to occ      Wt Readings from Last 1 Encounters:   22 121 kg (266 lb 12.8 oz)        Anesthesia Evaluation   Pt has had prior anesthetic.         ROS/MED HX  ENT/Pulmonary:       Neurologic:       Cardiovascular:     (+) Dyslipidemia hypertension-----    METS/Exercise Tolerance:     Hematologic:       Musculoskeletal:   (+) arthritis,     GI/Hepatic:     (+) GERD, liver disease,     Renal/Genitourinary:     (+) renal disease, type: CRI,     Endo:     (+) type II DM, Obesity,     Psychiatric/Substance Use:     (+) psychiatric history anxiety     Infectious Disease:       Malignancy:       Other:            Physical Exam    Airway        Mallampati: I   TM distance: > 3 FB   Neck ROM: full   Mouth opening: > 3 cm    Respiratory Devices and Support         Dental  no notable dental history         Cardiovascular   cardiovascular exam normal          Pulmonary   pulmonary exam normal                OUTSIDE LABS:  CBC:   Lab Results   Component Value Date    WBC 11.3 (H) 2019    WBC 11.6 (H) 2018    HGB 15.5 2019    HGB 15.7 2018    HCT 46.9 2019    HCT 48.3 2018     2019     2018     BMP:   Lab Results   Component Value Date     2022     2020    POTASSIUM 4.0 2022    POTASSIUM 4.0 2020    CHLORIDE 102  02/04/2022    CHLORIDE 102 08/26/2020    CO2 27 02/04/2022    CO2 27 08/26/2020    BUN 20 02/04/2022    BUN 18 08/26/2020    CR 0.79 02/04/2022    CR 0.77 08/26/2020     (H) 02/04/2022     (H) 08/26/2020     COAGS: No results found for: PTT, INR, FIBR  POC: No results found for: BGM, HCG, HCGS  HEPATIC:   Lab Results   Component Value Date    ALBUMIN 4.1 02/04/2022    PROTTOTAL 8.1 02/04/2022    ALT 92 (H) 02/04/2022    AST 43 02/04/2022    ALKPHOS 75 02/04/2022    BILITOTAL 0.5 02/04/2022     OTHER:   Lab Results   Component Value Date    A1C 7.8 (H) 02/04/2022    RAJ 9.2 02/04/2022    PHOS 2.4 (L) 10/10/2003    MAG 2.1 10/10/2003    TSH 1.57 02/04/2022    CRP 43.9 (H) 06/26/2015    SED 9 10/05/2015       Anesthesia Plan    ASA Status:  3      Anesthesia Type: General.              Consents    Anesthesia Plan(s) and associated risks, benefits, and realistic alternatives discussed. Questions answered and patient/representative(s) expressed understanding.     - Discussed: Risks, Benefits and Alternatives for BOTH SEDATION and the PROCEDURE were discussed     - Discussed with:  Patient         Postoperative Care       PONV prophylaxis: Background Propofol Infusion     Comments:                Yolanda Hernandez CRNA, APRN DYLAN

## 2022-09-04 DIAGNOSIS — E79.0 HYPERURICEMIA: ICD-10-CM

## 2022-09-06 ENCOUNTER — ANESTHESIA (OUTPATIENT)
Dept: GASTROENTEROLOGY | Facility: CLINIC | Age: 47
End: 2022-09-06
Payer: COMMERCIAL

## 2022-09-06 ENCOUNTER — HOSPITAL ENCOUNTER (OUTPATIENT)
Facility: CLINIC | Age: 47
Discharge: HOME | End: 2022-09-06
Attending: SURGERY | Admitting: SURGERY
Payer: COMMERCIAL

## 2022-09-06 VITALS
WEIGHT: 260 LBS | SYSTOLIC BLOOD PRESSURE: 142 MMHG | RESPIRATION RATE: 18 BRPM | HEIGHT: 74 IN | BODY MASS INDEX: 33.37 KG/M2 | TEMPERATURE: 97.7 F | HEART RATE: 91 BPM | OXYGEN SATURATION: 96 % | DIASTOLIC BLOOD PRESSURE: 102 MMHG

## 2022-09-06 LAB — COLONOSCOPY: NORMAL

## 2022-09-06 PROCEDURE — 45378 DIAGNOSTIC COLONOSCOPY: CPT | Performed by: SURGERY

## 2022-09-06 PROCEDURE — 370N000017 HC ANESTHESIA TECHNICAL FEE, PER MIN: Performed by: SURGERY

## 2022-09-06 PROCEDURE — 250N000011 HC RX IP 250 OP 636: Performed by: NURSE ANESTHETIST, CERTIFIED REGISTERED

## 2022-09-06 PROCEDURE — 88305 TISSUE EXAM BY PATHOLOGIST: CPT | Mod: TC | Performed by: SURGERY

## 2022-09-06 PROCEDURE — 45385 COLONOSCOPY W/LESION REMOVAL: CPT | Performed by: SURGERY

## 2022-09-06 PROCEDURE — 88305 TISSUE EXAM BY PATHOLOGIST: CPT | Mod: 26 | Performed by: PATHOLOGY

## 2022-09-06 RX ORDER — ONDANSETRON 4 MG/1
4 TABLET, ORALLY DISINTEGRATING ORAL EVERY 30 MIN PRN
Status: DISCONTINUED | OUTPATIENT
Start: 2022-09-06 | End: 2022-09-06 | Stop reason: HOSPADM

## 2022-09-06 RX ORDER — ONDANSETRON 2 MG/ML
4 INJECTION INTRAMUSCULAR; INTRAVENOUS EVERY 30 MIN PRN
Status: DISCONTINUED | OUTPATIENT
Start: 2022-09-06 | End: 2022-09-06 | Stop reason: HOSPADM

## 2022-09-06 RX ORDER — NALOXONE HYDROCHLORIDE 0.4 MG/ML
0.4 INJECTION, SOLUTION INTRAMUSCULAR; INTRAVENOUS; SUBCUTANEOUS
Status: DISCONTINUED | OUTPATIENT
Start: 2022-09-06 | End: 2022-09-06 | Stop reason: HOSPADM

## 2022-09-06 RX ORDER — FENTANYL CITRATE 50 UG/ML
25 INJECTION, SOLUTION INTRAMUSCULAR; INTRAVENOUS EVERY 5 MIN PRN
Status: DISCONTINUED | OUTPATIENT
Start: 2022-09-06 | End: 2022-09-06 | Stop reason: HOSPADM

## 2022-09-06 RX ORDER — MEPERIDINE HYDROCHLORIDE 25 MG/ML
12.5 INJECTION INTRAMUSCULAR; INTRAVENOUS; SUBCUTANEOUS
Status: DISCONTINUED | OUTPATIENT
Start: 2022-09-06 | End: 2022-09-06 | Stop reason: HOSPADM

## 2022-09-06 RX ORDER — OXYCODONE HYDROCHLORIDE 5 MG/1
5 TABLET ORAL EVERY 4 HOURS PRN
Status: DISCONTINUED | OUTPATIENT
Start: 2022-09-06 | End: 2022-09-06 | Stop reason: HOSPADM

## 2022-09-06 RX ORDER — PROPOFOL 10 MG/ML
INJECTION, EMULSION INTRAVENOUS PRN
Status: DISCONTINUED | OUTPATIENT
Start: 2022-09-06 | End: 2022-09-06

## 2022-09-06 RX ORDER — SODIUM CHLORIDE, SODIUM LACTATE, POTASSIUM CHLORIDE, CALCIUM CHLORIDE 600; 310; 30; 20 MG/100ML; MG/100ML; MG/100ML; MG/100ML
INJECTION, SOLUTION INTRAVENOUS CONTINUOUS
Status: DISCONTINUED | OUTPATIENT
Start: 2022-09-06 | End: 2022-09-06 | Stop reason: HOSPADM

## 2022-09-06 RX ORDER — FENTANYL CITRATE 50 UG/ML
25 INJECTION, SOLUTION INTRAMUSCULAR; INTRAVENOUS
Status: DISCONTINUED | OUTPATIENT
Start: 2022-09-06 | End: 2022-09-06 | Stop reason: HOSPADM

## 2022-09-06 RX ORDER — NALOXONE HYDROCHLORIDE 0.4 MG/ML
0.2 INJECTION, SOLUTION INTRAMUSCULAR; INTRAVENOUS; SUBCUTANEOUS
Status: DISCONTINUED | OUTPATIENT
Start: 2022-09-06 | End: 2022-09-06 | Stop reason: HOSPADM

## 2022-09-06 RX ORDER — HYDROMORPHONE HCL IN WATER/PF 6 MG/30 ML
0.2 PATIENT CONTROLLED ANALGESIA SYRINGE INTRAVENOUS EVERY 5 MIN PRN
Status: DISCONTINUED | OUTPATIENT
Start: 2022-09-06 | End: 2022-09-06 | Stop reason: HOSPADM

## 2022-09-06 RX ADMIN — PROPOFOL 150 MG: 10 INJECTION, EMULSION INTRAVENOUS at 07:39

## 2022-09-06 RX ADMIN — PROPOFOL 150 MG: 10 INJECTION, EMULSION INTRAVENOUS at 07:42

## 2022-09-06 RX ADMIN — PROPOFOL 150 MG: 10 INJECTION, EMULSION INTRAVENOUS at 07:41

## 2022-09-06 RX ADMIN — PROPOFOL 150 MG: 10 INJECTION, EMULSION INTRAVENOUS at 07:40

## 2022-09-06 ASSESSMENT — ACTIVITIES OF DAILY LIVING (ADL): ADLS_ACUITY_SCORE: 35

## 2022-09-06 NOTE — ANESTHESIA CARE TRANSFER NOTE
Patient: Bon Perkins    Procedure: Procedure(s):  COLONOSCOPY with polypectomy       Diagnosis: Positive FIT (fecal immunochemical test) [R19.5]  Diagnosis Additional Information: No value filed.    Anesthesia Type:   General     Note:    Oropharynx: oropharynx clear of all foreign objects and spontaneously breathing  Level of Consciousness: awake  Oxygen Supplementation: room air      Dentition: dentition unchanged  Vital Signs Stable: post-procedure vital signs reviewed and stable  Report to RN Given: handoff report given  Patient transferred to: Phase II    Handoff Report: Identifed the Patient, Identified the Reponsible Provider, Reviewed the pertinent medical history, Discussed the surgical course, Reviewed Intra-OP anesthesia mangement and issues during anesthesia, Set expectations for post-procedure period and Allowed opportunity for questions and acknowledgement of understanding      Vitals:  Vitals Value Taken Time   BP     Temp     Pulse     Resp     SpO2         Electronically Signed By: TANNER Ramirez CRNA  September 6, 2022  8:03 AM

## 2022-09-06 NOTE — LETTER
[facility]           [address] [place] [address]           Tel [phone]  Bon LEWIS Sanjeev  [address] [address]      September 7, 2022    Dear Bon,  This letter is to inform you of the results of your pathology report on your colonoscopy.  If you have questions please feel free to call:  Please call [phone], for  [facility] or  for [facility], to schedule a follow up appointment in 2 weeks.       Your pathology report was:  Showed an Adenomatous polyp. This is a benign (not cancerous) growth; however these can become cancer over time. This polyp is usually removed completely at the time of the biopsy. Because it is an Adenomatous polyp you do have a slight higher risk for colon cancer. This is why you will need a repeat colonoscopy in approximately 7 years.  If you do have further questions please don t hesitate to call the below number.    To make an appointment call:  Please call [phone], for  [facility] or  for [facility], to schedule a follow up appointment in 2 weeks.     Sincerely,     Magen Issa M.D.  ___

## 2022-09-06 NOTE — H&P
ENDOSCOPY PRE-SEDATION H&P FOR OUTPATIENT PROCEDURES    Bon Perkins  [id number]  1975    Procedure:   Colonoscopy possible biopsy, possible polypectomy, with moderate sedation.     Pre-procedure diagnosis: positive cologuard    Past medical history:   Past Medical History:   Diagnosis Date     Arthritis     gout       Past surgical history:   Past Surgical History:   Procedure Laterality Date     ARTHROSCOPY KNEE RT/LT  2006    right knee     ORTHOPEDIC SURGERY  1995    R hand fracture- open reduction.       No current facility-administered medications for this encounter.       No Known Allergies    History of Anesthesia/Sedation Problems: no    Physical Exam:    Mental status: alert  Heart: Normal  Lung: Normal  Assessment of patient's airway: Normal  Other as pertinent for procedure: None     ASA Score: See Provation note    Mallampati score:  II - Faucial pillars and soft palate may be seen, but uvula is masked by the base of the tongue    Assessment/Plan:     The patient is an appropriate candidate to receive sedation.    Informed consent was discussed with the patient/family, including the risks, benefits, potential complications and any alternative options associated with sedation.    Patient assessment completed just prior to sedation and while under constant observation by the provider. Condition determined to be adequate for proceeding with sedation.    The specific risks for the procedure were discussed with the patient at the time of informed consent and include but are not limited to perforation which could require surgery, missing significant neoplasm or lesion, hemorrhage and adverse sedative complication.      Magen Issa MD

## 2022-09-06 NOTE — ANESTHESIA POSTPROCEDURE EVALUATION
Patient: Bon Perkins    Procedure: Procedure(s):  COLONOSCOPY with polypectomy       Anesthesia Type:  General    Note:  Disposition: Outpatient   Postop Pain Control: Uneventful            Sign Out: Well controlled pain   PONV: No   Neuro/Psych: Uneventful            Sign Out: Acceptable/Baseline neuro status   Airway/Respiratory: Uneventful            Sign Out: Acceptable/Baseline resp. status   CV/Hemodynamics: Uneventful            Sign Out: Acceptable CV status; No obvious hypovolemia; No obvious fluid overload   Other NRE: NONE   DID A NON-ROUTINE EVENT OCCUR? No           Last vitals:  Vitals Value Taken Time   /97 09/06/22 0806   Temp 36.5  C (97.7  F) 09/06/22 0806   Pulse 85 09/06/22 0806   Resp 18 09/06/22 0806   SpO2 96 % 09/06/22 0820   Vitals shown include unvalidated device data.    Electronically Signed By: TANNER Ramirez CRNA  September 6, 2022  8:20 AM

## 2022-09-07 PROBLEM — Z86.0100 HISTORY OF COLONIC POLYPS: Status: ACTIVE | Noted: 2022-09-07

## 2022-09-07 LAB
PATH REPORT.COMMENTS IMP SPEC: NORMAL
PATH REPORT.COMMENTS IMP SPEC: NORMAL
PATH REPORT.FINAL DX SPEC: NORMAL
PATH REPORT.GROSS SPEC: NORMAL
PATH REPORT.MICROSCOPIC SPEC OTHER STN: NORMAL
PATH REPORT.RELEVANT HX SPEC: NORMAL
PHOTO IMAGE: NORMAL

## 2022-09-07 RX ORDER — ALLOPURINOL 300 MG/1
TABLET ORAL
Qty: 90 TABLET | Refills: 0 | Status: SHIPPED | OUTPATIENT
Start: 2022-09-07 | End: 2022-12-03

## 2022-10-29 DIAGNOSIS — F41.9 ANXIETY: ICD-10-CM

## 2022-11-01 RX ORDER — VENLAFAXINE 75 MG/1
TABLET ORAL
Qty: 90 TABLET | Refills: 0 | Status: SHIPPED | OUTPATIENT
Start: 2022-11-01 | End: 2023-01-24

## 2022-11-14 DIAGNOSIS — K21.9 GASTROESOPHAGEAL REFLUX DISEASE WITHOUT ESOPHAGITIS: ICD-10-CM

## 2022-11-14 DIAGNOSIS — I10 ESSENTIAL HYPERTENSION WITH GOAL BLOOD PRESSURE LESS THAN 140/90: ICD-10-CM

## 2022-11-16 RX ORDER — ATENOLOL 50 MG/1
TABLET ORAL
Qty: 90 TABLET | Refills: 0 | Status: SHIPPED | OUTPATIENT
Start: 2022-11-16 | End: 2022-12-23

## 2022-11-16 NOTE — TELEPHONE ENCOUNTER
Last Written Prescription Date:  11/17/21  Last Fill Quantity: 90,  # refills: 3   Last office visit: 2/4/2022 with prescribing provider:     Future Office Visit:    Routing refill request to provider for review/approval because:  BP out of range:  142/102 was day of colonoscopy  BP Readings from Last 3 Encounters:   09/06/22 (!) 142/102   02/04/22 126/86   03/29/21 138/86   Eulalia MOJICA RN

## 2022-11-19 ENCOUNTER — HEALTH MAINTENANCE LETTER (OUTPATIENT)
Age: 47
End: 2022-11-19

## 2022-11-26 DIAGNOSIS — E11.9 TYPE 2 DIABETES MELLITUS WITHOUT COMPLICATION, WITHOUT LONG-TERM CURRENT USE OF INSULIN (H): ICD-10-CM

## 2022-11-28 RX ORDER — FLASH GLUCOSE SENSOR
KIT MISCELLANEOUS
Qty: 6 EACH | Refills: 3 | Status: SHIPPED | OUTPATIENT
Start: 2022-11-28 | End: 2023-12-22

## 2022-11-28 NOTE — TELEPHONE ENCOUNTER
Routing to ordering provider for consideration, not on refill protocol.           Caterina Aparicio     RN MSN

## 2022-11-29 ENCOUNTER — TELEPHONE (OUTPATIENT)
Dept: FAMILY MEDICINE | Facility: CLINIC | Age: 47
End: 2022-11-29

## 2022-11-29 DIAGNOSIS — E11.9 TYPE 2 DIABETES MELLITUS WITHOUT COMPLICATION, WITHOUT LONG-TERM CURRENT USE OF INSULIN (H): ICD-10-CM

## 2022-11-29 NOTE — TELEPHONE ENCOUNTER
Pt's wife says Bon's Jardiance needs to be sent as a 90 day Rx to [facility]. She says they would charge him $900 for one month but if it is sent as 90 day he only pays $75.  I did tell her he is due for an appointment. She says she will let him know. She says he has a difficult schedule so she was not able to make apt for him but will let him know he needs to schedule diabetic visit. He needs this sent as soon as possible please.

## 2022-12-23 ENCOUNTER — OFFICE VISIT (OUTPATIENT)
Dept: FAMILY MEDICINE | Facility: CLINIC | Age: 47
End: 2022-12-23
Payer: COMMERCIAL

## 2022-12-23 VITALS
DIASTOLIC BLOOD PRESSURE: 92 MMHG | RESPIRATION RATE: 18 BRPM | HEART RATE: 85 BPM | HEIGHT: 74 IN | WEIGHT: 268 LBS | OXYGEN SATURATION: 95 % | BODY MASS INDEX: 34.39 KG/M2 | SYSTOLIC BLOOD PRESSURE: 150 MMHG | TEMPERATURE: 98.2 F

## 2022-12-23 DIAGNOSIS — N18.1 TYPE 2 DIABETES MELLITUS WITH STAGE 1 CHRONIC KIDNEY DISEASE, WITHOUT LONG-TERM CURRENT USE OF INSULIN (H): Primary | ICD-10-CM

## 2022-12-23 DIAGNOSIS — E78.2 MIXED HYPERLIPIDEMIA: ICD-10-CM

## 2022-12-23 DIAGNOSIS — I10 ESSENTIAL HYPERTENSION WITH GOAL BLOOD PRESSURE LESS THAN 140/90: ICD-10-CM

## 2022-12-23 DIAGNOSIS — E11.22 TYPE 2 DIABETES MELLITUS WITH STAGE 1 CHRONIC KIDNEY DISEASE, WITHOUT LONG-TERM CURRENT USE OF INSULIN (H): Primary | ICD-10-CM

## 2022-12-23 LAB
ALBUMIN SERPL BCG-MCNC: 4.6 G/DL (ref 3.5–5.2)
ALP SERPL-CCNC: 87 U/L (ref 40–129)
ALT SERPL W P-5'-P-CCNC: 95 U/L (ref 10–50)
ANION GAP SERPL CALCULATED.3IONS-SCNC: 13 MMOL/L (ref 7–15)
AST SERPL W P-5'-P-CCNC: 53 U/L (ref 10–50)
BILIRUB DIRECT SERPL-MCNC: <0.2 MG/DL (ref 0–0.3)
BILIRUB SERPL-MCNC: 0.5 MG/DL
BUN SERPL-MCNC: 23.9 MG/DL (ref 6–20)
CALCIUM SERPL-MCNC: 9.9 MG/DL (ref 8.6–10)
CHLORIDE SERPL-SCNC: 98 MMOL/L (ref 98–107)
CREAT SERPL-MCNC: 0.69 MG/DL (ref 0.67–1.17)
DEPRECATED HCO3 PLAS-SCNC: 25 MMOL/L (ref 22–29)
ERYTHROCYTE [DISTWIDTH] IN BLOOD BY AUTOMATED COUNT: 12.2 % (ref 10–15)
GFR SERPL CREATININE-BSD FRML MDRD: >90 ML/MIN/1.73M2
GLUCOSE SERPL-MCNC: 155 MG/DL (ref 70–99)
HBA1C MFR BLD: 7.5 % (ref 0–5.6)
HCT VFR BLD AUTO: 50.1 % (ref 40–53)
HGB BLD-MCNC: 17.3 G/DL (ref 13.3–17.7)
MCH RBC QN AUTO: 30.8 PG (ref 26.5–33)
MCHC RBC AUTO-ENTMCNC: 34.5 G/DL (ref 31.5–36.5)
MCV RBC AUTO: 89 FL (ref 78–100)
PLATELET # BLD AUTO: 171 10E3/UL (ref 150–450)
POTASSIUM SERPL-SCNC: 4.4 MMOL/L (ref 3.4–5.3)
PROT SERPL-MCNC: 8 G/DL (ref 6.4–8.3)
RBC # BLD AUTO: 5.62 10E6/UL (ref 4.4–5.9)
SODIUM SERPL-SCNC: 136 MMOL/L (ref 136–145)
WBC # BLD AUTO: 12.8 10E3/UL (ref 4–11)

## 2022-12-23 PROCEDURE — 36415 COLL VENOUS BLD VENIPUNCTURE: CPT | Performed by: FAMILY MEDICINE

## 2022-12-23 PROCEDURE — 82248 BILIRUBIN DIRECT: CPT | Performed by: FAMILY MEDICINE

## 2022-12-23 PROCEDURE — 85027 COMPLETE CBC AUTOMATED: CPT | Performed by: FAMILY MEDICINE

## 2022-12-23 PROCEDURE — 99214 OFFICE O/P EST MOD 30 MIN: CPT | Performed by: FAMILY MEDICINE

## 2022-12-23 PROCEDURE — 99207 PR FOOT EXAM NO CHARGE: CPT | Performed by: FAMILY MEDICINE

## 2022-12-23 PROCEDURE — 80053 COMPREHEN METABOLIC PANEL: CPT | Performed by: FAMILY MEDICINE

## 2022-12-23 PROCEDURE — 83036 HEMOGLOBIN GLYCOSYLATED A1C: CPT | Performed by: FAMILY MEDICINE

## 2022-12-23 RX ORDER — ATORVASTATIN CALCIUM 20 MG/1
20 TABLET, FILM COATED ORAL DAILY
Qty: 90 TABLET | Refills: 1
Start: 2022-12-23 | End: 2023-04-24

## 2022-12-23 RX ORDER — ATENOLOL 50 MG/1
50 TABLET ORAL DAILY
Qty: 90 TABLET | Refills: 1 | Status: SHIPPED | OUTPATIENT
Start: 2022-12-23 | End: 2023-04-24

## 2022-12-23 ASSESSMENT — PAIN SCALES - GENERAL: PAINLEVEL: NO PAIN (0)

## 2022-12-23 NOTE — PROGRESS NOTES
"  Assessment & Plan     Type 2 diabetes mellitus with stage 1 chronic kidney disease, without long-term current use of insulin (H)  Last hemoglobin A1c 7.8, above target goal of less than 7.  Management options discussed.  Ozempic prescribed and suggested to continue Jardiance.  History of metformin intolerance.  Patient deferred diabetic educator referral.  Suggested regular exercise, healthy diet and weight loss.  - Hemoglobin A1c; Future  - FOOT EXAM  - semaglutide (OZEMPIC) 2 MG/1.5ML SOPN pen; Initial: 0.25 mg once weekly for 4 weeks, then increase to 0.5 mg once weekly. May increase to 1 mg once weekly after 4 weeks on the 0.5 mg/week dose if needed to achieve glycemic goals    Essential hypertension with goal blood pressure less than 140/90  Blood pressure above target goal of less than 140/90.  Recommended healthy lifestyle modifications including regular exercise, strict diabetic diet and weight loss.  Hopefully blood pressure will be better once starts taking Ozempic as well  - atenolol (TENORMIN) 50 MG tablet; Take 1 tablet (50 mg) by mouth daily  - Basic metabolic panel  (Ca, Cl, CO2, Creat, Gluc, K, Na, BUN); Future  - CBC with platelets; Future    Mixed hyperlipidemia  Cardiovascular risk explained in detail and suggested to take Lipitor as recommended by previous provider  - atorvastatin (LIPITOR) 20 MG tablet; Take 1 tablet (20 mg) by mouth daily       BMI:   Estimated body mass index is 34.41 kg/m  as calculated from the following:    Height as of this encounter: 1.88 m (6' 2\").    Weight as of this encounter: 121.6 kg (268 lb).   Weight management plan: Discussed healthy diet and exercise guidelines      Sergio Ulloa MD  [facility]    Frank Crockett is a 47 year old, presenting for the following health issues:  Diabetes      History of Present Illness       Diabetes:   He presents for follow up of diabetes.  He is checking home blood glucose three times daily. He " "checks blood glucose before and after meals.  Blood glucose is sometimes over 200 and never under 70. When his blood glucose is low, the patient is asymptomatic for confusion, blurred vision, lethargy and reports not feeling dizzy, shaky, or weak.  He has no concerns regarding his diabetes at this time.  He is not experiencing numbness or burning in feet, excessive thirst, blurry vision, weight changes or redness, sores or blisters on feet.         He eats 0-1 servings of fruits and vegetables daily.He consumes 0 sweetened beverage(s) daily.He exercises with enough effort to increase his heart rate 9 or less minutes per day.  He exercises with enough effort to increase his heart rate 3 or less days per week.   He is taking medications regularly.        Review of Systems   Constitutional, HEENT, cardiovascular, pulmonary, GI, , musculoskeletal, neuro, skin, endocrine and psych systems are negative, except as otherwise noted.      Objective    BP (!) 150/92 (Cuff Size: Adult Large)   Pulse 85   Temp 98.2  F (36.8  C) (Tympanic)   Resp 18   Ht 1.88 m (6' 2\")   Wt 121.6 kg (268 lb)   SpO2 95%   BMI 34.41 kg/m    Body mass index is 34.41 kg/m .  Physical Exam   GENERAL: alert, no distress and obese  EYES: Eyes grossly normal to inspection, PERRL and conjunctivae and sclerae normal  HENT: normal cephalic/atraumatic, nose and mouth without ulcers or lesions, oropharynx clear and oral mucous membranes moist  NECK: no adenopathy, no asymmetry, masses, or scars and thyroid normal to palpation  RESP: lungs clear to auscultation - no rales, rhonchi or wheezes  CV: regular rate and rhythm, normal S1 S2, no S3 or S4, no murmur, click or rub, no peripheral edema and peripheral pulses strong  ABDOMEN: soft, nontender, no hepatosplenomegaly, no masses and bowel sounds normal  MS: no gross musculoskeletal defects noted, no edema  SKIN: no suspicious lesions or rashes  NEURO: Normal strength and tone, mentation intact and " speech normal  PSYCH: mentation appears normal, affect normal/bright  Diabetic foot exam: normal DP and PT pulses, no trophic changes or ulcerative lesions and normal sensory exam      Lab Results   Component Value Date    A1C 7.8 02/04/2022    A1C 10.6 01/27/2021    A1C 7.7 08/26/2020    A1C 11.2 11/25/2019     Wt Readings from Last 10 Encounters:   12/23/22 121.6 kg (268 lb)   09/06/22 117.9 kg (260 lb)   02/04/22 121 kg (266 lb 12.8 oz)   03/29/21 121.1 kg (267 lb)   01/27/21 120.7 kg (266 lb)   08/26/20 124.3 kg (274 lb)   12/19/19 121.1 kg (267 lb)   11/25/19 123.4 kg (272 lb)   07/26/18 132 kg (291 lb)   07/23/17 132.1 kg (291 lb 3.2 oz)       The 10-year ASCVD risk score (Imer FUCHS, et al., 2019) is: 8.9%    Values used to calculate the score:      Age: 47 years      Sex: Male      Is Non- : No      Diabetic: Yes      Tobacco smoker: No      Systolic Blood Pressure: 150 mmHg      Is BP treated: Yes      HDL Cholesterol: 42 mg/dL      Total Cholesterol: 211 mg/dL

## 2022-12-23 NOTE — NURSING NOTE
"Chief Complaint   Patient presents with     Diabetes     BP (!) 150/92 (Cuff Size: Adult Large)   Pulse 85   Temp 98.2  F (36.8  C) (Tympanic)   Resp 18   Ht 1.88 m (6' 2\")   Wt 121.6 kg (268 lb)   SpO2 95%   BMI 34.41 kg/m   Estimated body mass index is 34.41 kg/m  as calculated from the following:    Height as of this encounter: 1.88 m (6' 2\").    Weight as of this encounter: 121.6 kg (268 lb).  Patient presents to the clinic using No DME      Health Maintenance that is potentially due pending provider review:    Health Maintenance Due   Topic Date Due     YEARLY PREVENTIVE VISIT  Never done     ADVANCE CARE PLANNING  Never done     HEPATITIS B IMMUNIZATION (1 of 3 - 3-dose series) Never done     COVID-19 Vaccine (1) Never done     URINALYSIS  Never done     HIV SCREENING  Never done     DIABETIC FOOT EXAM  08/26/2021     Pneumococcal Vaccine: Pediatrics (0 to 5 Years) and At-Risk Patients (6 to 64 Years) (2 - PCV) 01/27/2022     A1C  05/04/2022     INFLUENZA VACCINE (1) Never done                "

## 2023-01-03 ENCOUNTER — TELEPHONE (OUTPATIENT)
Dept: FAMILY MEDICINE | Facility: CLINIC | Age: 48
End: 2023-01-03

## 2023-01-03 DIAGNOSIS — E11.22 TYPE 2 DIABETES MELLITUS WITH STAGE 1 CHRONIC KIDNEY DISEASE, WITHOUT LONG-TERM CURRENT USE OF INSULIN (H): ICD-10-CM

## 2023-01-03 DIAGNOSIS — N18.1 TYPE 2 DIABETES MELLITUS WITH STAGE 1 CHRONIC KIDNEY DISEASE, WITHOUT LONG-TERM CURRENT USE OF INSULIN (H): ICD-10-CM

## 2023-01-03 NOTE — TELEPHONE ENCOUNTER
[facility] reaching out for new semaglutide prescription.    Current prescription combines sliding scale in one Rx.    Requesting Rx to be  into two:    1) Rx for initial 0.25 mg once weekly for 4 weeks, then increase to 0.5 mg once weekly for 4 weeks    2) Rx for 1 mg once weekly after 4 weeks on 0.5 mg.    Jessa Carrillo RN

## 2023-01-22 DIAGNOSIS — F41.9 ANXIETY: ICD-10-CM

## 2023-01-24 RX ORDER — VENLAFAXINE 75 MG/1
75 TABLET ORAL 3 TIMES DAILY
Qty: 270 TABLET | Refills: 1 | Status: SHIPPED | OUTPATIENT
Start: 2023-01-24 | End: 2023-12-22

## 2023-02-28 DIAGNOSIS — I10 ESSENTIAL HYPERTENSION WITH GOAL BLOOD PRESSURE LESS THAN 140/90: ICD-10-CM

## 2023-02-28 RX ORDER — LISINOPRIL 30 MG/1
TABLET ORAL
Qty: 90 TABLET | Refills: 0 | Status: SHIPPED | OUTPATIENT
Start: 2023-02-28 | End: 2023-04-24

## 2023-03-02 DIAGNOSIS — E79.0 HYPERURICEMIA: ICD-10-CM

## 2023-03-03 RX ORDER — ALLOPURINOL 300 MG/1
TABLET ORAL
Qty: 90 TABLET | Refills: 3 | Status: SHIPPED | OUTPATIENT
Start: 2023-03-03 | End: 2024-02-27

## 2023-03-03 NOTE — TELEPHONE ENCOUNTER
Last Written Prescription Date:  12/03/22  Last Fill Quantity: 90,  # refills: 0   Last office visit: 12/23/2022 with prescribing provider:    Future Office Visit:    Routing refill request to provider for review/approval because:  Labs not current:    Uric Acid   Date Value Ref Range Status   11/25/2019 4.4 3.5 - 7.2 mg/dL Nadya MOJICA RN

## 2023-04-09 ENCOUNTER — HEALTH MAINTENANCE LETTER (OUTPATIENT)
Age: 48
End: 2023-04-09

## 2023-04-10 DIAGNOSIS — N18.1 TYPE 2 DIABETES MELLITUS WITH STAGE 1 CHRONIC KIDNEY DISEASE, WITHOUT LONG-TERM CURRENT USE OF INSULIN (H): ICD-10-CM

## 2023-04-10 DIAGNOSIS — E11.22 TYPE 2 DIABETES MELLITUS WITH STAGE 1 CHRONIC KIDNEY DISEASE, WITHOUT LONG-TERM CURRENT USE OF INSULIN (H): ICD-10-CM

## 2023-04-10 RX ORDER — SEMAGLUTIDE 1.34 MG/ML
INJECTION, SOLUTION SUBCUTANEOUS
Qty: 3 ML | Refills: 1 | Status: SHIPPED | OUTPATIENT
Start: 2023-04-10 | End: 2023-04-25

## 2023-04-24 ENCOUNTER — OFFICE VISIT (OUTPATIENT)
Dept: FAMILY MEDICINE | Facility: CLINIC | Age: 48
End: 2023-04-24
Payer: COMMERCIAL

## 2023-04-24 VITALS
HEART RATE: 89 BPM | SYSTOLIC BLOOD PRESSURE: 140 MMHG | TEMPERATURE: 97.5 F | HEIGHT: 74 IN | WEIGHT: 267 LBS | OXYGEN SATURATION: 98 % | RESPIRATION RATE: 18 BRPM | BODY MASS INDEX: 34.27 KG/M2 | DIASTOLIC BLOOD PRESSURE: 94 MMHG

## 2023-04-24 DIAGNOSIS — N18.1 CKD (CHRONIC KIDNEY DISEASE) STAGE 1, GFR 90 ML/MIN OR GREATER: ICD-10-CM

## 2023-04-24 DIAGNOSIS — E66.01 CLASS 2 SEVERE OBESITY WITH SERIOUS COMORBIDITY AND BODY MASS INDEX (BMI) OF 35.0 TO 35.9 IN ADULT, UNSPECIFIED OBESITY TYPE (H): ICD-10-CM

## 2023-04-24 DIAGNOSIS — N18.1 TYPE 2 DIABETES MELLITUS WITH STAGE 1 CHRONIC KIDNEY DISEASE, WITHOUT LONG-TERM CURRENT USE OF INSULIN (H): ICD-10-CM

## 2023-04-24 DIAGNOSIS — E66.812 CLASS 2 SEVERE OBESITY WITH SERIOUS COMORBIDITY AND BODY MASS INDEX (BMI) OF 35.0 TO 35.9 IN ADULT, UNSPECIFIED OBESITY TYPE (H): ICD-10-CM

## 2023-04-24 DIAGNOSIS — Z00.00 ROUTINE GENERAL MEDICAL EXAMINATION AT A HEALTH CARE FACILITY: Primary | ICD-10-CM

## 2023-04-24 DIAGNOSIS — E11.22 TYPE 2 DIABETES MELLITUS WITH STAGE 1 CHRONIC KIDNEY DISEASE, WITHOUT LONG-TERM CURRENT USE OF INSULIN (H): ICD-10-CM

## 2023-04-24 DIAGNOSIS — E78.2 MIXED HYPERLIPIDEMIA: ICD-10-CM

## 2023-04-24 DIAGNOSIS — I10 ESSENTIAL HYPERTENSION WITH GOAL BLOOD PRESSURE LESS THAN 140/90: ICD-10-CM

## 2023-04-24 LAB
CHOLEST SERPL-MCNC: 211 MG/DL
CREAT UR-MCNC: 117 MG/DL
HBA1C MFR BLD: 7.3 % (ref 0–5.6)
HDLC SERPL-MCNC: 45 MG/DL
LDLC SERPL CALC-MCNC: 127 MG/DL
MICROALBUMIN UR-MCNC: 71.9 MG/L
MICROALBUMIN/CREAT UR: 61.45 MG/G CR (ref 0–17)
NONHDLC SERPL-MCNC: 166 MG/DL
TRIGL SERPL-MCNC: 195 MG/DL

## 2023-04-24 PROCEDURE — 80061 LIPID PANEL: CPT | Performed by: FAMILY MEDICINE

## 2023-04-24 PROCEDURE — 82570 ASSAY OF URINE CREATININE: CPT | Performed by: FAMILY MEDICINE

## 2023-04-24 PROCEDURE — 83036 HEMOGLOBIN GLYCOSYLATED A1C: CPT | Performed by: FAMILY MEDICINE

## 2023-04-24 PROCEDURE — 99396 PREV VISIT EST AGE 40-64: CPT | Performed by: FAMILY MEDICINE

## 2023-04-24 PROCEDURE — 82043 UR ALBUMIN QUANTITATIVE: CPT | Performed by: FAMILY MEDICINE

## 2023-04-24 PROCEDURE — 99214 OFFICE O/P EST MOD 30 MIN: CPT | Mod: 25 | Performed by: FAMILY MEDICINE

## 2023-04-24 PROCEDURE — 36415 COLL VENOUS BLD VENIPUNCTURE: CPT | Performed by: FAMILY MEDICINE

## 2023-04-24 RX ORDER — LISINOPRIL 30 MG/1
30 TABLET ORAL DAILY
Qty: 90 TABLET | Refills: 3 | Status: SHIPPED | OUTPATIENT
Start: 2023-04-24 | End: 2023-12-22

## 2023-04-24 RX ORDER — ATORVASTATIN CALCIUM 10 MG/1
10 TABLET, FILM COATED ORAL DAILY
Qty: 90 TABLET | Refills: 1 | Status: SHIPPED | OUTPATIENT
Start: 2023-04-24 | End: 2023-04-25

## 2023-04-24 RX ORDER — ATENOLOL 50 MG/1
50 TABLET ORAL DAILY
Qty: 90 TABLET | Refills: 3 | Status: SHIPPED | OUTPATIENT
Start: 2023-04-24 | End: 2024-05-15

## 2023-04-24 ASSESSMENT — ENCOUNTER SYMPTOMS
NAUSEA: 0
HEMATURIA: 0
CONSTIPATION: 0
HEADACHES: 0
HEARTBURN: 0
PALPITATIONS: 0
DYSURIA: 0
ARTHRALGIAS: 0
PARESTHESIAS: 0
COUGH: 0
CHILLS: 0
NERVOUS/ANXIOUS: 0
EYE PAIN: 0
DIZZINESS: 0
FREQUENCY: 0
HEMATOCHEZIA: 0
ABDOMINAL PAIN: 0
SORE THROAT: 0
DIARRHEA: 0
SHORTNESS OF BREATH: 0
JOINT SWELLING: 0
MYALGIAS: 0
WEAKNESS: 0
FEVER: 0

## 2023-04-24 ASSESSMENT — PAIN SCALES - GENERAL: PAINLEVEL: NO PAIN (0)

## 2023-04-24 NOTE — NURSING NOTE
"Chief Complaint   Patient presents with     Physical     BP (!) 140/94 (Cuff Size: Adult Large)   Pulse 89   Temp 97.5  F (36.4  C) (Tympanic)   Resp 18   Ht 1.88 m (6' 2\")   Wt 121.1 kg (267 lb)   SpO2 98%   BMI 34.28 kg/m   Estimated body mass index is 34.28 kg/m  as calculated from the following:    Height as of this encounter: 1.88 m (6' 2\").    Weight as of this encounter: 121.1 kg (267 lb).  Patient presents to the clinic using No DME      Health Maintenance that is potentially due pending provider review:    Health Maintenance Due   Topic Date Due     YEARLY PREVENTIVE VISIT  Never done     HEPATITIS B IMMUNIZATION (1 of 3 - 3-dose series) Never done     COVID-19 Vaccine (1) Never done     URINALYSIS  Never done     HIV SCREENING  Never done     Pneumococcal Vaccine: Pediatrics (0 to 5 Years) and At-Risk Patients (6 to 64 Years) (2 - PCV) 01/27/2022     INFLUENZA VACCINE (1) Never done     EYE EXAM  02/01/2023     LIPID  02/04/2023     MICROALBUMIN  02/04/2023     ANNUAL REVIEW OF HM ORDERS  02/04/2023     A1C  03/23/2023                "

## 2023-04-24 NOTE — PROGRESS NOTES
SUBJECTIVE:   CC: Bon is an 47 year old who presents for preventative health visit.   Patient has been advised of split billing requirements and indicates understanding: Yes  Healthy Habits:     Getting at least 3 servings of Calcium per day:  Yes    Bi-annual eye exam:  Yes    Dental care twice a year:  Yes    Sleep apnea or symptoms of sleep apnea:  None    Diet:  Diabetic    Frequency of exercise:  None    Taking medications regularly:  Yes    Medication side effects:  None    PHQ-2 Total Score: 0    Additional concerns today:  No      PROBLEMS TO ADD ON...  Diabetes Follow-up    How often are you checking your blood sugar? Continuous glucose monitor  What time of day are you checking your blood sugars (select all that apply)?  Before and after meals and At bedtime  Have you had any blood sugars above 200?  No  Have you had any blood sugars below 70?  No    What symptoms do you notice when your blood sugar is low?  None    What concerns do you have today about your diabetes? None     Do you have any of these symptoms? (Select all that apply)  No numbness or tingling in feet.  No redness, sores or blisters on feet.  No complaints of excessive thirst.  No reports of blurry vision.  No significant changes to weight.    Have you had a diabetic eye exam in the last 12 months? Yes- Date of last eye exam: last summer,  Location: [facility]        BP Readings from Last 2 Encounters:   04/24/23 (!) 140/94   12/23/22 (!) 150/92     Hemoglobin A1C (%)   Date Value   12/23/2022 7.5 (H)   02/04/2022 7.8 (H)   01/27/2021 10.6 (H)   08/26/2020 7.7 (H)     LDL Cholesterol Calculated (mg/dL)   Date Value   02/04/2022 118 (H)   01/27/2021 101 (H)   07/26/2018 97         Today's PHQ-2 Score:       4/24/2023    10:09 AM   PHQ-2 ( 1999 [employer])   Q1: Little interest or pleasure in doing things 0   Q2: Feeling down, depressed or hopeless 0   PHQ-2 Score 0   Q1: Little interest or pleasure in doing things Not at all    Not  at all   Q2: Feeling down, depressed or hopeless Not at all    Not at all   PHQ-2 Score 0    0       Have you ever done Advance Care Planning? (For example, a Health Directive, POLST, or a discussion with a medical provider or your loved ones about your wishes): No, advance care planning information given to patient to review.  Patient plans to discuss their wishes with loved ones or provider.      Social History     Tobacco Use     Smoking status: Never     Passive exposure: Never     Smokeless tobacco: Former     Types: Chew     Tobacco comments:     quit in July 2006   Vaping Use     Vaping status: Never Used   Substance Use Topics     Alcohol use: Yes     Comment: daily to occ             4/24/2023    10:09 AM   Alcohol Use   Prescreen: >3 drinks/day or >7 drinks/week? Yes   AUDIT SCORE  6       Last PSA: No results found for: PSA    Reviewed orders with patient. Reviewed health maintenance and updated orders accordingly - Yes  Lab work is in process  Labs reviewed in EPIC  BP Readings from Last 3 Encounters:   04/24/23 (!) 140/94   12/23/22 (!) 150/92   09/06/22 (!) 142/102    Wt Readings from Last 3 Encounters:   04/24/23 121.1 kg (267 lb)   12/23/22 121.6 kg (268 lb)   09/06/22 117.9 kg (260 lb)                  Patient Active Problem List   Diagnosis     ESOPHAGEAL REFLUX     Idiopathic chronic gout of multiple sites without tophus     Essential hypertension with goal blood pressure less than 140/90     Preseptal cellulitis     Class 2 severe obesity with serious comorbidity and body mass index (BMI) of 35.0 to 35.9 in adult (H)     Elevated liver enzymes     Type 2 diabetes mellitus with stage 1 chronic kidney disease, without long-term current use of insulin (H)     Anxiety     Hyperlipidemia LDL goal <100     CKD (chronic kidney disease) stage 1, GFR 90 ml/min or greater     History of colonic polyps     Past Surgical History:   Procedure Laterality Date     ARTHROSCOPY KNEE RT/LT  2006    right knee      COLONOSCOPY N/A 9/6/2022    Procedure: COLONOSCOPY with polypectomy;  Surgeon: Magen Issa MD;  Location: [facility]     ORTHOPEDIC SURGERY  1995    R hand fracture- open reduction.       Social History     Tobacco Use     Smoking status: Never     Passive exposure: Never     Smokeless tobacco: Former     Types: Chew     Tobacco comments:     quit in July 2006   Vaping Use     Vaping status: Never Used   Substance Use Topics     Alcohol use: Yes     Comment: daily to occ     Family History   Problem Relation Age of Onset     Breast Cancer Mother      Hypertension Father      C.A.D. Father      Cancer Father 64        lung, back, brain, liver         Current Outpatient Medications   Medication Sig Dispense Refill     allopurinol (ZYLOPRIM) 300 MG tablet TAKE 1 TABLET DAILY (NEEDS DIABETES APPOINTMENT FOR FURTHER REFILLS) 90 tablet 3     atenolol (TENORMIN) 50 MG tablet Take 1 tablet (50 mg) by mouth daily 90 tablet 3     atorvastatin (LIPITOR) 10 MG tablet Take 1 tablet (10 mg) by mouth daily 90 tablet 1     blood glucose (ONETOUCH ULTRA) test strip 100 strips by In Vitro route daily Use to test blood sugar 1 times daily or as directed. 100 strip 0     blood glucose monitoring (NO BRAND SPECIFIED) meter device kit Use to test blood sugar 2 times daily  Preferred blood glucose meter OR supplies to accompany: Blood Glucose Monitor Brands: per insurance. 1 kit 0     Continuous Blood Gluc Sensor (FREESTYLE WOJCIECH 14 DAY SENSOR) [facility] CHANGE SENSOR EVERY 14 DAYS 6 each 3     empagliflozin (JARDIANCE) 25 MG TABS tablet Take 1 tablet (25 mg) by mouth daily 90 tablet 0     lisinopril (ZESTRIL) 30 MG tablet Take 1 tablet (30 mg) by mouth daily 90 tablet 3     omeprazole (PRILOSEC) 20 MG DR capsule Take 1 capsule (20 mg) by mouth daily 90 capsule 2     OZEMPIC, 1 MG/DOSE, 4 MG/3ML pen INJECT 1 MG UNDER THE SKIN ONCE A WEEK 3 mL 1     sildenafil (VIAGRA) 50 MG tablet Take 1 tablet (50 mg) by mouth daily as needed (prior to  intercourse.) 18 tablet 3     thin (NO BRAND SPECIFIED) lancets Use with lanceting device. To accompany: Blood Glucose Monitor Brands: per insurance.  Test twice a day 60 each 6     venlafaxine (EFFEXOR) 75 MG tablet Take 1 tablet (75 mg) by mouth 3 times daily 270 tablet 1     Allergies   Allergen Reactions     Metformin Diarrhea     Recent Labs   Lab Test 12/23/22  1043 02/04/22  1254 01/27/21  1340 08/26/20  1105 11/25/19  1936 07/26/18  1051 07/26/18  1051   A1C 7.5* 7.8* 10.6* 7.7* 11.2*   < >  --    LDL  --  118* 101*  --   --   --  97   HDL  --  42 42  --   --   --  37*   TRIG  --  254* 318*  --   --   --  251*   ALT 95* 92*  --   --  99*  --  105*   CR 0.69 0.79  --  0.77 0.65*  --  0.77   GFRESTIMATED >90 >90  --  >90 >90  --  >90   GFRESTBLACK  --   --   --  >90 >90  --  >90   POTASSIUM 4.4 4.0  --  4.0 4.0  --  4.0   TSH  --  1.57  --   --   --   --   --     < > = values in this interval not displayed.        Reviewed and updated as needed this visit by clinical staff   Tobacco  Allergies  Meds              Reviewed and updated as needed this visit by Provider                     Review of Systems   Constitutional: Negative for chills and fever.   HENT: Negative for congestion, ear pain, hearing loss and sore throat.    Eyes: Negative for pain and visual disturbance.   Respiratory: Negative for cough and shortness of breath.    Cardiovascular: Negative for chest pain, palpitations and peripheral edema.   Gastrointestinal: Negative for abdominal pain, constipation, diarrhea, heartburn, hematochezia and nausea.   Genitourinary: Negative for dysuria, frequency, genital sores, hematuria and urgency.   Musculoskeletal: Negative for arthralgias, joint swelling and myalgias.   Skin: Negative for rash.   Neurological: Negative for dizziness, weakness, headaches and paresthesias.   Psychiatric/Behavioral: Negative for mood changes. The patient is not nervous/anxious.      The 10-year ASCVD risk score (Imer  "JEF, et al., 2019) is: 7.9%    Values used to calculate the score:      Age: 47 years      Sex: Male      Is Non- : No      Diabetic: Yes      Tobacco smoker: No      Systolic Blood Pressure: 140 mmHg      Is BP treated: Yes      HDL Cholesterol: 42 mg/dL      Total Cholesterol: 211 mg/dL      OBJECTIVE:   BP (!) 140/94 (Cuff Size: Adult Large)   Pulse 89   Temp 97.5  F (36.4  C) (Tympanic)   Resp 18   Ht 1.88 m (6' 2\")   Wt 121.1 kg (267 lb)   SpO2 98%   BMI 34.28 kg/m      Physical Exam  GENERAL: alert, no distress and obese  EYES: Eyes grossly normal to inspection, PERRL and conjunctivae and sclerae normal  HENT: normal cephalic/atraumatic, nose and mouth without ulcers or lesions, oropharynx clear and oral mucous membranes moist  NECK: no adenopathy, no asymmetry, masses, or scars and thyroid normal to palpation  RESP: lungs clear to auscultation - no rales, rhonchi or wheezes  CV: regular rates and rhythm, normal S1 S2, no S3 or S4 and no murmur, click or rub  MS: no gross musculoskeletal defects noted, no edema  SKIN: no suspicious lesions or rashes  NEURO: Normal strength and tone, mentation intact and speech normal  PSYCH: mentation appears normal, affect normal/bright      ASSESSMENT/PLAN:   (Z00.00) Routine general medical examination at a health care facility  (primary encounter diagnosis)  Comment: Medically doing well.  Medications reviewed and no changes made.  Patient deferred routine vaccines.  Recommended regular exercise, healthy diet and weight loss.      (N18.1) CKD (chronic kidney disease) stage 1, GFR 90 ml/min or greater  Comment:   Plan: Albumin Random Urine Quantitative with Creat         Ratio, CANCELED: UA reflex to Microscopic and         Culture ([id number])            (E11.22,  N18.1) Type 2 diabetes mellitus with stage 1 chronic kidney disease, without long-term current use of insulin (H)  Comment: Last hemoglobin A1c 7.5, above target goal of less than 7.  " Suggested to continue strict diabetic diet, Ozempic and Jardiance.  Plan: Lipid panel reflex to direct LDL Non-fasting,         HEMOGLOBIN A1C            (I10) Essential hypertension with goal blood pressure less than 140/90  Comment: Home blood pressure readings are stable.  Recommended to continue atenolol, Zestril  Plan: atenolol (TENORMIN) 50 MG tablet, lisinopril         (ZESTRIL) 30 MG tablet            (E66.01,  Z68.35) Class 2 severe obesity with serious comorbidity and body mass index (BMI) of 35.0 to 35.9 in adult, unspecified obesity type (H)  Comment: Healthy lifestyle modifications stressed including regular exercise, balanced diet, weight loss and limiting salt/caffeine/pop intake  Plan:     (E78.2) Mixed hyperlipidemia  Comment: Patient agreeable to start Lipitor at a lower dose.  Risks and benefits explained in detail.  Plan: atorvastatin (LIPITOR) 10 MG tablet            COUNSELING:   Reviewed preventive health counseling, as reflected in patient instructions        He reports that he has never smoked. He has never been exposed to tobacco smoke. He has quit using smokeless tobacco.  His smokeless tobacco use included chew.        Sergio Ulloa MD  [facility]

## 2023-04-25 DIAGNOSIS — E78.2 MIXED HYPERLIPIDEMIA: ICD-10-CM

## 2023-04-25 DIAGNOSIS — E11.22 TYPE 2 DIABETES MELLITUS WITH STAGE 1 CHRONIC KIDNEY DISEASE, WITHOUT LONG-TERM CURRENT USE OF INSULIN (H): Primary | ICD-10-CM

## 2023-04-25 DIAGNOSIS — N18.1 TYPE 2 DIABETES MELLITUS WITH STAGE 1 CHRONIC KIDNEY DISEASE, WITHOUT LONG-TERM CURRENT USE OF INSULIN (H): Primary | ICD-10-CM

## 2023-04-25 RX ORDER — ATORVASTATIN CALCIUM 20 MG/1
20 TABLET, FILM COATED ORAL DAILY
Qty: 90 TABLET | Refills: 3 | Status: SHIPPED | OUTPATIENT
Start: 2023-04-25 | End: 2024-04-25

## 2023-04-25 RX ORDER — SEMAGLUTIDE 1.34 MG/ML
2 INJECTION, SOLUTION SUBCUTANEOUS
Qty: 12 ML | Refills: 1 | Status: SHIPPED | OUTPATIENT
Start: 2023-04-25 | End: 2023-08-10

## 2023-06-20 DIAGNOSIS — E11.9 TYPE 2 DIABETES MELLITUS WITHOUT COMPLICATION, WITHOUT LONG-TERM CURRENT USE OF INSULIN (H): ICD-10-CM

## 2023-08-09 DIAGNOSIS — N18.1 TYPE 2 DIABETES MELLITUS WITH STAGE 1 CHRONIC KIDNEY DISEASE, WITHOUT LONG-TERM CURRENT USE OF INSULIN (H): ICD-10-CM

## 2023-08-09 DIAGNOSIS — E11.22 TYPE 2 DIABETES MELLITUS WITH STAGE 1 CHRONIC KIDNEY DISEASE, WITHOUT LONG-TERM CURRENT USE OF INSULIN (H): ICD-10-CM

## 2023-08-10 RX ORDER — SEMAGLUTIDE 1.34 MG/ML
INJECTION, SOLUTION SUBCUTANEOUS
Qty: 3 ML | Refills: 12 | Status: SHIPPED | OUTPATIENT
Start: 2023-08-10 | End: 2024-02-06

## 2023-08-30 DIAGNOSIS — E11.9 TYPE 2 DIABETES MELLITUS WITHOUT COMPLICATION, WITHOUT LONG-TERM CURRENT USE OF INSULIN (H): ICD-10-CM

## 2023-08-31 RX ORDER — EMPAGLIFLOZIN 25 MG/1
TABLET, FILM COATED ORAL
Qty: 90 TABLET | Refills: 3 | OUTPATIENT
Start: 2023-08-31

## 2023-08-31 NOTE — TELEPHONE ENCOUNTER
Overdue for needed care. Please call to schedule diabetic follow up. Once appt is scheduled, route back to me.    Julie Behrendt RN

## 2023-09-10 ENCOUNTER — HEALTH MAINTENANCE LETTER (OUTPATIENT)
Age: 48
End: 2023-09-10

## 2023-11-02 ENCOUNTER — PATIENT OUTREACH (OUTPATIENT)
Dept: GASTROENTEROLOGY | Facility: CLINIC | Age: 48
End: 2023-11-02
Payer: COMMERCIAL

## 2023-11-16 DIAGNOSIS — K21.9 GASTROESOPHAGEAL REFLUX DISEASE WITHOUT ESOPHAGITIS: ICD-10-CM

## 2023-11-24 ENCOUNTER — MEDICAL CORRESPONDENCE (OUTPATIENT)
Dept: HEALTH INFORMATION MANAGEMENT | Facility: CLINIC | Age: 48
End: 2023-11-24
Payer: COMMERCIAL

## 2023-11-30 DIAGNOSIS — N52.9 ERECTILE DYSFUNCTION, UNSPECIFIED ERECTILE DYSFUNCTION TYPE: ICD-10-CM

## 2023-11-30 RX ORDER — SILDENAFIL 50 MG/1
50 TABLET, FILM COATED ORAL DAILY PRN
Qty: 18 TABLET | Refills: 3 | Status: SHIPPED | OUTPATIENT
Start: 2023-11-30 | End: 2023-12-05

## 2023-12-03 ENCOUNTER — MEDICAL CORRESPONDENCE (OUTPATIENT)
Dept: HEALTH INFORMATION MANAGEMENT | Facility: CLINIC | Age: 48
End: 2023-12-03
Payer: COMMERCIAL

## 2023-12-05 ENCOUNTER — MYC REFILL (OUTPATIENT)
Dept: FAMILY MEDICINE | Facility: CLINIC | Age: 48
End: 2023-12-05
Payer: COMMERCIAL

## 2023-12-05 DIAGNOSIS — E11.9 TYPE 2 DIABETES MELLITUS WITHOUT COMPLICATION, WITHOUT LONG-TERM CURRENT USE OF INSULIN (H): ICD-10-CM

## 2023-12-05 DIAGNOSIS — N52.9 ERECTILE DYSFUNCTION, UNSPECIFIED ERECTILE DYSFUNCTION TYPE: ICD-10-CM

## 2023-12-05 RX ORDER — SILDENAFIL 50 MG/1
50 TABLET, FILM COATED ORAL DAILY PRN
Qty: 18 TABLET | Refills: 3 | Status: SHIPPED | OUTPATIENT
Start: 2023-12-05 | End: 2023-12-22

## 2023-12-22 ENCOUNTER — OFFICE VISIT (OUTPATIENT)
Dept: FAMILY MEDICINE | Facility: CLINIC | Age: 48
End: 2023-12-22
Payer: COMMERCIAL

## 2023-12-22 VITALS
HEIGHT: 75 IN | HEART RATE: 88 BPM | BODY MASS INDEX: 32.75 KG/M2 | WEIGHT: 263.4 LBS | TEMPERATURE: 97.8 F | SYSTOLIC BLOOD PRESSURE: 128 MMHG | RESPIRATION RATE: 16 BRPM | DIASTOLIC BLOOD PRESSURE: 88 MMHG

## 2023-12-22 DIAGNOSIS — N18.1 CKD (CHRONIC KIDNEY DISEASE) STAGE 1, GFR 90 ML/MIN OR GREATER: Primary | ICD-10-CM

## 2023-12-22 DIAGNOSIS — N18.1 TYPE 2 DIABETES MELLITUS WITH STAGE 1 CHRONIC KIDNEY DISEASE, WITHOUT LONG-TERM CURRENT USE OF INSULIN (H): ICD-10-CM

## 2023-12-22 DIAGNOSIS — K76.0 NON-ALCOHOLIC FATTY LIVER DISEASE: ICD-10-CM

## 2023-12-22 DIAGNOSIS — R73.09 ELEVATED GLUCOSE: ICD-10-CM

## 2023-12-22 DIAGNOSIS — F41.9 ANXIETY: ICD-10-CM

## 2023-12-22 DIAGNOSIS — I10 ESSENTIAL HYPERTENSION WITH GOAL BLOOD PRESSURE LESS THAN 140/90: ICD-10-CM

## 2023-12-22 DIAGNOSIS — M1A.09X0 IDIOPATHIC CHRONIC GOUT OF MULTIPLE SITES WITHOUT TOPHUS: ICD-10-CM

## 2023-12-22 DIAGNOSIS — R79.89 DECREASED TESTOSTERONE LEVEL IN MALE: ICD-10-CM

## 2023-12-22 DIAGNOSIS — E11.22 TYPE 2 DIABETES MELLITUS WITH STAGE 1 CHRONIC KIDNEY DISEASE, WITHOUT LONG-TERM CURRENT USE OF INSULIN (H): ICD-10-CM

## 2023-12-22 DIAGNOSIS — N52.9 ERECTILE DYSFUNCTION, UNSPECIFIED ERECTILE DYSFUNCTION TYPE: ICD-10-CM

## 2023-12-22 DIAGNOSIS — E78.2 MIXED HYPERLIPIDEMIA: ICD-10-CM

## 2023-12-22 LAB
ALBUMIN SERPL BCG-MCNC: 4.6 G/DL (ref 3.5–5.2)
ALBUMIN UR-MCNC: NEGATIVE MG/DL
ALP SERPL-CCNC: 82 U/L (ref 40–150)
ALT SERPL W P-5'-P-CCNC: 91 U/L (ref 0–70)
ANION GAP SERPL CALCULATED.3IONS-SCNC: 16 MMOL/L (ref 7–15)
APPEARANCE UR: CLEAR
AST SERPL W P-5'-P-CCNC: 69 U/L (ref 0–45)
BILIRUB SERPL-MCNC: 0.6 MG/DL
BILIRUB UR QL STRIP: ABNORMAL
BUN SERPL-MCNC: 15.9 MG/DL (ref 6–20)
CALCIUM SERPL-MCNC: 9.6 MG/DL (ref 8.6–10)
CHLORIDE SERPL-SCNC: 101 MMOL/L (ref 98–107)
COLOR UR AUTO: YELLOW
CREAT SERPL-MCNC: 0.7 MG/DL (ref 0.67–1.17)
DEPRECATED HCO3 PLAS-SCNC: 23 MMOL/L (ref 22–29)
EGFRCR SERPLBLD CKD-EPI 2021: >90 ML/MIN/1.73M2
GLUCOSE SERPL-MCNC: 163 MG/DL (ref 70–99)
GLUCOSE UR STRIP-MCNC: >=1000 MG/DL
HBA1C MFR BLD: 9.3 % (ref 0–5.6)
HGB UR QL STRIP: NEGATIVE
KETONES UR STRIP-MCNC: 40 MG/DL
LEUKOCYTE ESTERASE UR QL STRIP: NEGATIVE
NITRATE UR QL: NEGATIVE
PH UR STRIP: 5.5 [PH] (ref 5–7)
POTASSIUM SERPL-SCNC: 4.3 MMOL/L (ref 3.4–5.3)
PROT SERPL-MCNC: 7.7 G/DL (ref 6.4–8.3)
SODIUM SERPL-SCNC: 140 MMOL/L (ref 135–145)
SP GR UR STRIP: 1.02 (ref 1–1.03)
URATE SERPL-MCNC: 5.2 MG/DL (ref 3.4–7)
UROBILINOGEN UR STRIP-ACNC: 0.2 E.U./DL

## 2023-12-22 PROCEDURE — 83036 HEMOGLOBIN GLYCOSYLATED A1C: CPT | Performed by: FAMILY MEDICINE

## 2023-12-22 PROCEDURE — 99214 OFFICE O/P EST MOD 30 MIN: CPT | Mod: 25 | Performed by: FAMILY MEDICINE

## 2023-12-22 PROCEDURE — 81003 URINALYSIS AUTO W/O SCOPE: CPT | Performed by: FAMILY MEDICINE

## 2023-12-22 PROCEDURE — 84403 ASSAY OF TOTAL TESTOSTERONE: CPT | Performed by: FAMILY MEDICINE

## 2023-12-22 PROCEDURE — 96127 BRIEF EMOTIONAL/BEHAV ASSMT: CPT | Performed by: FAMILY MEDICINE

## 2023-12-22 PROCEDURE — 90677 PCV20 VACCINE IM: CPT | Performed by: FAMILY MEDICINE

## 2023-12-22 PROCEDURE — 36415 COLL VENOUS BLD VENIPUNCTURE: CPT | Performed by: FAMILY MEDICINE

## 2023-12-22 PROCEDURE — 84550 ASSAY OF BLOOD/URIC ACID: CPT | Performed by: FAMILY MEDICINE

## 2023-12-22 PROCEDURE — 90471 IMMUNIZATION ADMIN: CPT | Performed by: FAMILY MEDICINE

## 2023-12-22 PROCEDURE — 80053 COMPREHEN METABOLIC PANEL: CPT | Performed by: FAMILY MEDICINE

## 2023-12-22 RX ORDER — ATORVASTATIN CALCIUM 20 MG/1
20 TABLET, FILM COATED ORAL DAILY
Qty: 90 TABLET | Refills: 3 | Status: CANCELLED | OUTPATIENT
Start: 2023-12-22

## 2023-12-22 RX ORDER — ATENOLOL 50 MG/1
50 TABLET ORAL DAILY
Qty: 90 TABLET | Refills: 3 | Status: CANCELLED | OUTPATIENT
Start: 2023-12-22

## 2023-12-22 RX ORDER — LANCETS
1 EACH MISCELLANEOUS 2 TIMES DAILY
Qty: 200 EACH | Refills: 6 | Status: SHIPPED | OUTPATIENT
Start: 2023-12-22 | End: 2024-07-02

## 2023-12-22 RX ORDER — SILDENAFIL 50 MG/1
50 TABLET, FILM COATED ORAL DAILY PRN
Qty: 18 TABLET | Refills: 3 | Status: SHIPPED | OUTPATIENT
Start: 2023-12-22 | End: 2024-09-13

## 2023-12-22 RX ORDER — ALLOPURINOL 300 MG/1
TABLET ORAL
Qty: 90 TABLET | Refills: 3 | Status: CANCELLED | OUTPATIENT
Start: 2023-12-22

## 2023-12-22 RX ORDER — LANCETS
EACH MISCELLANEOUS
Qty: 60 EACH | Refills: 6 | Status: CANCELLED | OUTPATIENT
Start: 2023-12-22

## 2023-12-22 RX ORDER — VENLAFAXINE 75 MG/1
75 TABLET ORAL 3 TIMES DAILY
Qty: 270 TABLET | Refills: 1 | Status: SHIPPED | OUTPATIENT
Start: 2023-12-22 | End: 2024-06-30

## 2023-12-22 RX ORDER — FLASH GLUCOSE SENSOR
KIT MISCELLANEOUS
Qty: 6 KIT | Refills: 3 | Status: SHIPPED | OUTPATIENT
Start: 2023-12-22

## 2023-12-22 RX ORDER — LISINOPRIL 40 MG/1
40 TABLET ORAL DAILY
Qty: 90 TABLET | Refills: 0 | Status: SHIPPED | OUTPATIENT
Start: 2023-12-22 | End: 2024-03-07

## 2023-12-22 ASSESSMENT — ANXIETY QUESTIONNAIRES
5. BEING SO RESTLESS THAT IT IS HARD TO SIT STILL: NEARLY EVERY DAY
IF YOU CHECKED OFF ANY PROBLEMS ON THIS QUESTIONNAIRE, HOW DIFFICULT HAVE THESE PROBLEMS MADE IT FOR YOU TO DO YOUR WORK, TAKE CARE OF THINGS AT HOME, OR GET ALONG WITH OTHER PEOPLE: NOT DIFFICULT AT ALL
6. BECOMING EASILY ANNOYED OR IRRITABLE: MORE THAN HALF THE DAYS
3. WORRYING TOO MUCH ABOUT DIFFERENT THINGS: NEARLY EVERY DAY
1. FEELING NERVOUS, ANXIOUS, OR ON EDGE: NEARLY EVERY DAY
7. FEELING AFRAID AS IF SOMETHING AWFUL MIGHT HAPPEN: NOT AT ALL
2. NOT BEING ABLE TO STOP OR CONTROL WORRYING: NOT AT ALL
GAD7 TOTAL SCORE: 14
GAD7 TOTAL SCORE: 14

## 2023-12-22 ASSESSMENT — ENCOUNTER SYMPTOMS
SHORTNESS OF BREATH: 0
ACTIVITY CHANGE: 0
APPETITE CHANGE: 0

## 2023-12-22 ASSESSMENT — PAIN SCALES - GENERAL: PAINLEVEL: NO PAIN (0)

## 2023-12-22 ASSESSMENT — PATIENT HEALTH QUESTIONNAIRE - PHQ9
SUM OF ALL RESPONSES TO PHQ QUESTIONS 1-9: 0
5. POOR APPETITE OR OVEREATING: NEARLY EVERY DAY

## 2023-12-22 NOTE — PROGRESS NOTES
Assessment & Plan     Bon was seen today for recheck medication.    Diagnoses and all orders for this visit:    CKD (chronic kidney disease) stage 1, GFR 90 ml/min or greater  -     Cancel: UA Macroscopic with reflex to Microscopic and Culture; Future  -     UA Macroscopic with reflex to Microscopic and Culture; Future    Lab as above    Type 2 diabetes mellitus with stage 1 chronic kidney disease, without long-term current use of insulin (H)  -     HEMOGLOBIN A1C; Future  -     Continuous Blood Gluc Sensor (FREESTYLE WOJCIECH 14 DAY SENSOR) MISC; CHANGE SENSOR EVERY 14 DAYS  -     Fingerstix Lancets MISC; 1 ampule 2 times daily    Labs as above  Continue Jardiance and Ozempic, titrate according to lab results    Essential hypertension with goal blood pressure less than 140/90  -     lisinopril (ZESTRIL) 40 MG tablet; Take 1 tablet (40 mg) by mouth daily for 90 days  -     Home Blood Pressure Monitor Order for DME - ONLY FOR DME    Increase lisinopril to 40mg daily, taper down atenolol: half to 25mg for at least two weeks and discontinue. Start measuring blood pressure at home    Mixed hyperlipidemia  Patient not taking statin    Erectile dysfunction, unspecified erectile dysfunction type  -     sildenafil (VIAGRA) 50 MG tablet; Take 1 tablet (50 mg) by mouth daily as needed (prior to intercourse.)  -     Testosterone, total; Future    Refilled medication, lab as above - stable    Anxiety  -     venlafaxine (EFFEXOR) 75 MG tablet; Take 1 tablet (75 mg) by mouth 3 times daily    Refilled as above    Idiopathic chronic gout of multiple sites without tophus  -     Uric acid; Future    Consider discontinuing allopurinol - patient expresses wanting to decrease amount of medication he is taking    Non-alcoholic fatty liver disease  -     Comprehensive metabolic panel (BMP + Alb, Alk Phos, ALT, AST, Total. Bili, TP); Future    Continue ozempic, lifestyle changes for weight loss    Elevated glucose  -     blood glucose  "monitoring (NO BRAND SPECIFIED) meter device kit; Use to test blood sugar 2 times daily  Preferred blood glucose meter OR supplies to accompany: Blood Glucose Monitor Brands: per insurance.    Other orders  -     Pneumococcal 20 Valent Conjugate (Prevnar 20)                  BMI:   Estimated body mass index is 33.14 kg/m  as calculated from the following:    Height as of this encounter: 1.899 m (6' 2.75\").    Weight as of this encounter: 119.5 kg (263 lb 6.4 oz).           Vanessa Carrillo MD  [facility]    Frank Crockett is a 48 year old, presenting for the following health issues:  Recheck Medication        12/22/2023     9:16 AM   Additional Questions   Roomed by Eve NEGRO   Accompanied by self         12/22/2023     9:16 AM   Patient Reported Additional Medications   Patient reports taking the following new medications none       History of Present Illness       Diabetes:   He presents for follow up of diabetes.  He is checking home blood glucose two times daily.   He checks blood glucose before meals.  Blood glucose is sometimes over 200 and never under 70. He is aware of hypoglycemia symptoms including none.   He is concerned about blood sugar frequently over 200.   He is having numbness in feet.  The patient has had a diabetic eye exam in the last 12 months. Eye exam performed on unsure. Location of last eye exam unsure.        Hypertension: He presents for follow up of hypertension.  He does not check blood pressure  regularly outside of the clinic. Outpatient blood pressures have not been over 140/90. He follows a low salt diet.     He eats 0-1 servings of fruits and vegetables daily.He consumes 0 sweetened beverage(s) daily.He exercises with enough effort to increase his heart rate 9 or less minutes per day.  He exercises with enough effort to increase his heart rate 3 or less days per week.   He is taking medications regularly.           Hyperlipidemia Follow-Up    Are you " "regularly taking any medication or supplement to lower your cholesterol?   No  Are you having muscle aches or other side effects that you think could be caused by your cholesterol lowering medication?  States his kidneys hurt while taking      BP Readings from Last 2 Encounters:   12/22/23 128/88   04/24/23 (!) 140/94     Hemoglobin A1C (%)   Date Value   12/22/2023 9.3 (H)   04/24/2023 7.3 (H)   01/27/2021 10.6 (H)   08/26/2020 7.7 (H)     LDL Cholesterol Calculated (mg/dL)   Date Value   04/24/2023 127 (H)   02/04/2022 118 (H)   01/27/2021 101 (H)   07/26/2018 97         Anxiety Follow-Up  How are you doing with your anxiety since your last visit? No change  Are you having other symptoms that might be associated with anxiety? No  Have you had a significant life event? No   Are you feeling depressed? No  Do you have any concerns with your use of alcohol or other drugs? No    Social History     Tobacco Use    Smoking status: Former     Types: Cigarettes     Passive exposure: Never    Smokeless tobacco: Former     Types: Chew    Tobacco comments:     quit in July 2006   Vaping Use    Vaping Use: Never used   Substance Use Topics    Alcohol use: Yes     Comment: daily to occ    Drug use: No         1/27/2021    12:52 PM 3/29/2021     2:55 PM 12/22/2023     9:24 AM   YOSHI-7 SCORE   Total Score 19 3 14         12/22/2023     9:24 AM   PHQ   PHQ-9 Total Score 0   Q9: Thoughts of better off dead/self-harm past 2 weeks Not at all               Review of Systems   Constitutional:  Negative for activity change and appetite change.   Respiratory:  Negative for shortness of breath.    Cardiovascular:  Negative for chest pain.            Objective    /88   Pulse 88   Temp 97.8  F (36.6  C) (Tympanic)   Resp 16   Ht 1.899 m (6' 2.75\")   Wt 119.5 kg (263 lb 6.4 oz)   BMI 33.14 kg/m    Body mass index is 33.14 kg/m .  Physical Exam  Vitals reviewed.   Cardiovascular:      Rate and Rhythm: Normal rate and regular rhythm. "   Pulmonary:      Effort: Pulmonary effort is normal.      Breath sounds: Normal breath sounds.   Musculoskeletal:      Right lower leg: No edema.      Left lower leg: No edema.   Neurological:      Mental Status: He is alert and oriented to person, place, and time.   Psychiatric:         Mood and Affect: Mood normal.         Behavior: Behavior normal.         Thought Content: Thought content normal.         Judgment: Judgment normal.

## 2023-12-30 LAB — TESTOST SERPL-MCNC: 186 NG/DL (ref 240–950)

## 2024-01-15 ENCOUNTER — TRANSFERRED RECORDS (OUTPATIENT)
Dept: HEALTH INFORMATION MANAGEMENT | Facility: CLINIC | Age: 49
End: 2024-01-15
Payer: COMMERCIAL

## 2024-01-15 LAB — RETINOPATHY: NEGATIVE

## 2024-01-26 ENCOUNTER — MYC REFILL (OUTPATIENT)
Dept: FAMILY MEDICINE | Facility: CLINIC | Age: 49
End: 2024-01-26
Payer: COMMERCIAL

## 2024-01-26 DIAGNOSIS — E11.22 TYPE 2 DIABETES MELLITUS WITH STAGE 1 CHRONIC KIDNEY DISEASE, WITHOUT LONG-TERM CURRENT USE OF INSULIN (H): ICD-10-CM

## 2024-01-26 DIAGNOSIS — N18.1 TYPE 2 DIABETES MELLITUS WITH STAGE 1 CHRONIC KIDNEY DISEASE, WITHOUT LONG-TERM CURRENT USE OF INSULIN (H): ICD-10-CM

## 2024-01-26 RX ORDER — SEMAGLUTIDE 1.34 MG/ML
INJECTION, SOLUTION SUBCUTANEOUS
Qty: 3 ML | Refills: 12 | Status: CANCELLED | OUTPATIENT
Start: 2024-01-26

## 2024-01-30 ENCOUNTER — MYC REFILL (OUTPATIENT)
Dept: FAMILY MEDICINE | Facility: CLINIC | Age: 49
End: 2024-01-30
Payer: COMMERCIAL

## 2024-01-30 DIAGNOSIS — E11.22 TYPE 2 DIABETES MELLITUS WITH STAGE 1 CHRONIC KIDNEY DISEASE, WITHOUT LONG-TERM CURRENT USE OF INSULIN (H): ICD-10-CM

## 2024-01-30 DIAGNOSIS — N18.1 TYPE 2 DIABETES MELLITUS WITH STAGE 1 CHRONIC KIDNEY DISEASE, WITHOUT LONG-TERM CURRENT USE OF INSULIN (H): ICD-10-CM

## 2024-01-30 NOTE — TELEPHONE ENCOUNTER
Pt calling as states Ozempic 2 mg  has been denied.   Informed Rx was prescribed yesterday by Dr. Carrillo, sent to [facility].  Duplicate request denied today.   Advised pt contact [facility], contact care team if any issues getting Rx.  SIMEON Harp RN

## 2024-02-06 ENCOUNTER — OFFICE VISIT (OUTPATIENT)
Dept: FAMILY MEDICINE | Facility: CLINIC | Age: 49
End: 2024-02-06
Payer: COMMERCIAL

## 2024-02-06 VITALS
TEMPERATURE: 98.1 F | RESPIRATION RATE: 16 BRPM | SYSTOLIC BLOOD PRESSURE: 134 MMHG | OXYGEN SATURATION: 96 % | HEIGHT: 75 IN | DIASTOLIC BLOOD PRESSURE: 86 MMHG | WEIGHT: 259 LBS | BODY MASS INDEX: 32.2 KG/M2 | HEART RATE: 104 BPM

## 2024-02-06 DIAGNOSIS — R79.89 LOW TESTOSTERONE IN MALE: ICD-10-CM

## 2024-02-06 DIAGNOSIS — E66.01 CLASS 2 SEVERE OBESITY WITH SERIOUS COMORBIDITY AND BODY MASS INDEX (BMI) OF 35.0 TO 35.9 IN ADULT, UNSPECIFIED OBESITY TYPE (H): ICD-10-CM

## 2024-02-06 DIAGNOSIS — N18.1 TYPE 2 DIABETES MELLITUS WITH STAGE 1 CHRONIC KIDNEY DISEASE, WITHOUT LONG-TERM CURRENT USE OF INSULIN (H): Primary | ICD-10-CM

## 2024-02-06 DIAGNOSIS — E66.812 CLASS 2 SEVERE OBESITY WITH SERIOUS COMORBIDITY AND BODY MASS INDEX (BMI) OF 35.0 TO 35.9 IN ADULT, UNSPECIFIED OBESITY TYPE (H): ICD-10-CM

## 2024-02-06 DIAGNOSIS — E11.22 TYPE 2 DIABETES MELLITUS WITH STAGE 1 CHRONIC KIDNEY DISEASE, WITHOUT LONG-TERM CURRENT USE OF INSULIN (H): Primary | ICD-10-CM

## 2024-02-06 LAB
ALBUMIN SERPL BCG-MCNC: 4.6 G/DL (ref 3.5–5.2)
ALP SERPL-CCNC: 78 U/L (ref 40–150)
ALT SERPL W P-5'-P-CCNC: 65 U/L (ref 0–70)
ANION GAP SERPL CALCULATED.3IONS-SCNC: 14 MMOL/L (ref 7–15)
AST SERPL W P-5'-P-CCNC: 40 U/L (ref 0–45)
BILIRUB SERPL-MCNC: 0.5 MG/DL
BUN SERPL-MCNC: 20.2 MG/DL (ref 6–20)
CALCIUM SERPL-MCNC: 9.7 MG/DL (ref 8.6–10)
CHLORIDE SERPL-SCNC: 99 MMOL/L (ref 98–107)
CREAT SERPL-MCNC: 0.77 MG/DL (ref 0.67–1.17)
DEPRECATED HCO3 PLAS-SCNC: 23 MMOL/L (ref 22–29)
EGFRCR SERPLBLD CKD-EPI 2021: >90 ML/MIN/1.73M2
GLUCOSE SERPL-MCNC: 142 MG/DL (ref 70–99)
HBA1C MFR BLD: 7.6 % (ref 0–5.6)
POTASSIUM SERPL-SCNC: 4.4 MMOL/L (ref 3.4–5.3)
PROT SERPL-MCNC: 8.1 G/DL (ref 6.4–8.3)
SODIUM SERPL-SCNC: 136 MMOL/L (ref 135–145)

## 2024-02-06 PROCEDURE — 99213 OFFICE O/P EST LOW 20 MIN: CPT | Performed by: FAMILY MEDICINE

## 2024-02-06 PROCEDURE — 83036 HEMOGLOBIN GLYCOSYLATED A1C: CPT | Performed by: FAMILY MEDICINE

## 2024-02-06 PROCEDURE — 84403 ASSAY OF TOTAL TESTOSTERONE: CPT | Performed by: FAMILY MEDICINE

## 2024-02-06 PROCEDURE — 80053 COMPREHEN METABOLIC PANEL: CPT | Performed by: FAMILY MEDICINE

## 2024-02-06 PROCEDURE — 36415 COLL VENOUS BLD VENIPUNCTURE: CPT | Performed by: FAMILY MEDICINE

## 2024-02-06 ASSESSMENT — PAIN SCALES - GENERAL: PAINLEVEL: NO PAIN (0)

## 2024-02-06 NOTE — NURSING NOTE
"Chief Complaint   Patient presents with    Diabetes    Hypertension       Initial BP (!) 148/90   Pulse 104   Temp 98.1  F (36.7  C) (Tympanic)   Resp 16   Ht 1.899 m (6' 2.75\")   Wt 117.5 kg (259 lb)   SpO2 96%   BMI 32.59 kg/m   Estimated body mass index is 32.59 kg/m  as calculated from the following:    Height as of this encounter: 1.899 m (6' 2.75\").    Weight as of this encounter: 117.5 kg (259 lb).    Patient presents to the clinic using No DME    Is there anyone who you would like to be able to receive your results? No  If yes have patient fill out IRMA      "

## 2024-02-06 NOTE — PROGRESS NOTES
"  Assessment & Plan     Type 2 diabetes mellitus with stage 1 chronic kidney disease, without long-term current use of insulin (H)  Labs as below. Fasting blood sugars per free style juan has improved. Continue Ozempic 2mg, jardiance 25  - Hemoglobin A1c; Future  - Comprehensive metabolic panel (BMP + Alb, Alk Phos, ALT, AST, Total. Bili, TP); Future    Class 2 severe obesity with serious comorbidity and body mass index (BMI) of 35.0 to 35.9 in adult, unspecified obesity type (H)  Tolerating Ozempic well, has not had significant weight loss. Patient states 2mg dose seems to be significantly more effective as he has noticed decreased appetite. Continue to monitor    Low testosterone in male  Previously had low testosterone level, will repeat to confirm. If low refer to endocrinology.  - Testosterone, total; Future              BMI  Estimated body mass index is 32.59 kg/m  as calculated from the following:    Height as of this encounter: 1.899 m (6' 2.75\").    Weight as of this encounter: 117.5 kg (259 lb).             Frank Crockett is a 48 year old, presenting for the following health issues:  Diabetes and Hypertension    History of Present Illness       Diabetes:   He presents for follow up of diabetes.  He is checking home blood glucose three times daily.   He checks blood glucose before meals and before and after meals.  Blood glucose is sometimes over 200 and never under 70. He is aware of hypoglycemia symptoms including none.   He is concerned about blood sugar frequently over 200.   He is having numbness in feet and blurry vision.            Hypertension: He presents for follow up of hypertension.  He does not check blood pressure  regularly outside of the clinic. Outside blood pressures have been over 140/90. He follows a low salt diet.     He eats 0-1 servings of fruits and vegetables daily.He consumes 0 sweetened beverage(s) daily.He exercises with enough effort to increase his heart rate 9 or less " "minutes per day.  He exercises with enough effort to increase his heart rate 3 or less days per week.   He is taking medications regularly.                     Objective    /86   Pulse 104   Temp 98.1  F (36.7  C) (Tympanic)   Resp 16   Ht 1.899 m (6' 2.75\")   Wt 117.5 kg (259 lb)   SpO2 96%   BMI 32.59 kg/m    Body mass index is 32.59 kg/m .  Physical Exam  Vitals reviewed.   Cardiovascular:      Rate and Rhythm: Normal rate.   Pulmonary:      Effort: Pulmonary effort is normal.   Neurological:      Mental Status: He is alert and oriented to person, place, and time.   Psychiatric:         Mood and Affect: Mood normal.         Behavior: Behavior normal.         Thought Content: Thought content normal.         Judgment: Judgment normal.                    Signed Electronically by: Vanessa Carrillo MD    "

## 2024-02-08 LAB — TESTOST SERPL-MCNC: 196 NG/DL (ref 240–950)

## 2024-02-14 DIAGNOSIS — E11.9 TYPE 2 DIABETES MELLITUS WITHOUT COMPLICATION, WITHOUT LONG-TERM CURRENT USE OF INSULIN (H): ICD-10-CM

## 2024-02-15 RX ORDER — EMPAGLIFLOZIN 25 MG/1
25 TABLET, FILM COATED ORAL DAILY
Qty: 100 TABLET | Refills: 1 | Status: SHIPPED | OUTPATIENT
Start: 2024-02-15 | End: 2024-08-13

## 2024-03-07 DIAGNOSIS — I10 ESSENTIAL HYPERTENSION WITH GOAL BLOOD PRESSURE LESS THAN 140/90: ICD-10-CM

## 2024-03-07 RX ORDER — LISINOPRIL 40 MG/1
40 TABLET ORAL DAILY
Qty: 90 TABLET | Refills: 1 | Status: SHIPPED | OUTPATIENT
Start: 2024-03-07

## 2024-03-25 ENCOUNTER — PATIENT OUTREACH (OUTPATIENT)
Dept: CARE COORDINATION | Facility: CLINIC | Age: 49
End: 2024-03-25
Payer: COMMERCIAL

## 2024-04-08 ENCOUNTER — PATIENT OUTREACH (OUTPATIENT)
Dept: CARE COORDINATION | Facility: CLINIC | Age: 49
End: 2024-04-08
Payer: COMMERCIAL

## 2024-04-24 DIAGNOSIS — E78.2 MIXED HYPERLIPIDEMIA: ICD-10-CM

## 2024-04-25 RX ORDER — ATORVASTATIN CALCIUM 20 MG/1
20 TABLET, FILM COATED ORAL DAILY
Qty: 90 TABLET | Refills: 0 | Status: SHIPPED | OUTPATIENT
Start: 2024-04-25 | End: 2024-07-24

## 2024-05-14 DIAGNOSIS — I10 ESSENTIAL HYPERTENSION WITH GOAL BLOOD PRESSURE LESS THAN 140/90: ICD-10-CM

## 2024-05-15 RX ORDER — ATENOLOL 50 MG/1
50 TABLET ORAL DAILY
Qty: 90 TABLET | Refills: 0 | Status: SHIPPED | OUTPATIENT
Start: 2024-05-15 | End: 2024-08-13

## 2024-06-04 ENCOUNTER — MYC REFILL (OUTPATIENT)
Dept: FAMILY MEDICINE | Facility: CLINIC | Age: 49
End: 2024-06-04
Payer: COMMERCIAL

## 2024-06-04 DIAGNOSIS — N18.1 TYPE 2 DIABETES MELLITUS WITH STAGE 1 CHRONIC KIDNEY DISEASE, WITHOUT LONG-TERM CURRENT USE OF INSULIN (H): ICD-10-CM

## 2024-06-04 DIAGNOSIS — E11.22 TYPE 2 DIABETES MELLITUS WITH STAGE 1 CHRONIC KIDNEY DISEASE, WITHOUT LONG-TERM CURRENT USE OF INSULIN (H): ICD-10-CM

## 2024-06-04 RX ORDER — FLASH GLUCOSE SENSOR
KIT MISCELLANEOUS
Qty: 6 EACH | Refills: 3 | OUTPATIENT
Start: 2024-06-04

## 2024-06-16 ENCOUNTER — HEALTH MAINTENANCE LETTER (OUTPATIENT)
Age: 49
End: 2024-06-16

## 2024-07-02 ENCOUNTER — OFFICE VISIT (OUTPATIENT)
Dept: ENDOCRINOLOGY | Facility: CLINIC | Age: 49
End: 2024-07-02
Payer: COMMERCIAL

## 2024-07-02 VITALS
OXYGEN SATURATION: 96 % | HEIGHT: 75 IN | DIASTOLIC BLOOD PRESSURE: 102 MMHG | TEMPERATURE: 98.1 F | WEIGHT: 252.8 LBS | RESPIRATION RATE: 16 BRPM | HEART RATE: 99 BPM | BODY MASS INDEX: 31.43 KG/M2 | SYSTOLIC BLOOD PRESSURE: 138 MMHG

## 2024-07-02 DIAGNOSIS — E11.22 TYPE 2 DIABETES MELLITUS WITH STAGE 1 CHRONIC KIDNEY DISEASE, WITHOUT LONG-TERM CURRENT USE OF INSULIN (H): ICD-10-CM

## 2024-07-02 DIAGNOSIS — E66.812 CLASS 2 SEVERE OBESITY WITH SERIOUS COMORBIDITY AND BODY MASS INDEX (BMI) OF 35.0 TO 35.9 IN ADULT, UNSPECIFIED OBESITY TYPE (H): ICD-10-CM

## 2024-07-02 DIAGNOSIS — E66.01 CLASS 2 SEVERE OBESITY WITH SERIOUS COMORBIDITY AND BODY MASS INDEX (BMI) OF 35.0 TO 35.9 IN ADULT, UNSPECIFIED OBESITY TYPE (H): ICD-10-CM

## 2024-07-02 DIAGNOSIS — N18.1 TYPE 2 DIABETES MELLITUS WITH STAGE 1 CHRONIC KIDNEY DISEASE, WITHOUT LONG-TERM CURRENT USE OF INSULIN (H): ICD-10-CM

## 2024-07-02 DIAGNOSIS — R79.89 LOW TESTOSTERONE IN MALE: ICD-10-CM

## 2024-07-02 PROCEDURE — 99204 OFFICE O/P NEW MOD 45 MIN: CPT | Performed by: PHYSICIAN ASSISTANT

## 2024-07-02 RX ORDER — SYRINGE W-NEEDLE,DISPOSAB,3 ML 25GX5/8"
1 SYRINGE, EMPTY DISPOSABLE MISCELLANEOUS
Qty: 50 EACH | Refills: 0 | Status: SHIPPED | OUTPATIENT
Start: 2024-07-02

## 2024-07-02 RX ORDER — TESTOSTERONE CYPIONATE 200 MG/ML
100 INJECTION, SOLUTION INTRAMUSCULAR
Qty: 2 ML | Refills: 0 | Status: SHIPPED | OUTPATIENT
Start: 2024-07-02 | End: 2024-07-24

## 2024-07-02 NOTE — PROGRESS NOTES
Assessment/Plan :   Low testosterone. We discussed Bon's recent laboratory results. He has low testosterone. We also reviewed the signs and symptoms of low testosterone and I am hopeful that it will help improve his energy, libido, and general mood. We also reviewed the possible adverse effects. Lastly, we discussed the difference between topical testosterone therapy and injections. He has a very physical job and he is worried that topical therapy may not absorb appropriately. We will start with testosterone cypionate injections 0.5 ml every 10 days. We reviewed proper injection technique. I will send a new prescription to the [facility] in [place]. If he has any trouble filling the prescription he will reach out to our office. We will repeat laboratory testing in 2 mos and we will follow-up in 6 mos.       I have independently reviewed and interpreted labs, imaging as indicated.      Chief complaint:  Bon is a 48 year old male seen in consultation at the request of Dr. Carrillo for low testosterone.     I have reviewed Care Everywhere including [facility], [facility], [facility], [facility], [facility] , [facility], [place] lab reports, imaging reports and provider notes as indicated.      HISTORY OF PRESENT ILLNESS  Bon noticed a significant decline in his overall energy over the last 8 yrs. He feels like he is tired all the time, despite getting a good night's sleep. He has also noticed that he feels more anxious and depressed, as well. He started venlafaxine a few years ago, which has helped but he still has episodes of anxiousness. He has also noticed that his libido has decreased, as well. He wouldn't say that he has erectile dysfunction but it does take longer for him to achieve an erection. He mentioned this to his primary care provider and asked for his testosterone level to be checked. He was told that it was low.    Bon has a history of Type 2 DM, hyperlipidemia, and  HTN. He states that he was really struggling with weight gain and poor diet about 5 yrs ago. He had gained weight and he just didn't feel good. He scheduled a follow-up with his primary care provider and he was diagnosed with diabetes. He reports that it was a wake up call for him to get back on track. Since that time he has been working hard to be healthier. He has been able to lose weight and his blood sugars have improved but he continues to struggle with fatigue and malaise.    Endocrine relevant labs are as follows:   Latest Reference Range & Units 02/06/24 09:18   Testosterone Total 240 - 950 ng/dL 196 (L)   (L): Data is abnormally low   Latest Reference Range & Units 12/22/23 10:23   Testosterone Total 240 - 950 ng/dL 186 (L)   (L): Data is abnormally low    REVIEW OF SYSTEMS    Endocrine: positive for diabetes and low testosterone  Skin: negative  Eyes: negative for, visual blurring, redness, tearing  Ears/Nose/Throat: negative  Respiratory: No shortness of breath, dyspnea on exertion, cough, or hemoptysis  Cardiovascular: positive for exercise intolerance and he gets fatigued easily, negative for, chest pain, dyspnea on exertion, and lower extremity edema  Gastrointestinal: negative for, nausea, vomiting, constipation, and diarrhea  Genitourinary: positive for erectile dysfunction and low libido, negative for, nocturia, dysuria, frequency, and urgency  Musculoskeletal: positive for muscular weakness, negative for, nocturnal cramping, and foot pain  Neurologic: negative for, local weakness, numbness or tingling of hands, and numbness or tingling of feet  Psychiatric: negative  Hematologic/Lymphatic/Immunologic: negative    Past Medical History  Past Medical History:   Diagnosis Date    Arthritis     gout       Medications  Current Outpatient Medications   Medication Sig Dispense Refill    allopurinol (ZYLOPRIM) 300 MG tablet TAKE 1 TABLET DAILY (NEEDS DIABETES APPOINTMENT FOR FURTHER REFILLS) 90 tablet 1     "atenolol (TENORMIN) 50 MG tablet TAKE 1 TABLET DAILY 90 tablet 0    atorvastatin (LIPITOR) 20 MG tablet TAKE 1 TABLET DAILY 90 tablet 0    Continuous Blood Gluc Sensor (FREESTYLE WOJCIECH 14 DAY SENSOR) MISC CHANGE SENSOR EVERY 14 DAYS 6 kit 3    empagliflozin (JARDIANCE) 25 MG TABS tablet TAKE 1 TABLET DAILY 100 tablet 1    lisinopril (ZESTRIL) 40 MG tablet Take 1 tablet (40 mg) by mouth daily 90 tablet 1    omeprazole (PRILOSEC) 20 MG DR capsule TAKE 1 CAPSULE DAILY 90 capsule 2    Semaglutide, 2 MG/DOSE, (OZEMPIC) 8 MG/3ML pen Inject 2 mg Subcutaneous every 7 days 9 mL 3    sildenafil (VIAGRA) 50 MG tablet Take 1 tablet (50 mg) by mouth daily as needed (prior to intercourse.) 18 tablet 3    venlafaxine (EFFEXOR) 75 MG tablet TAKE 1 TABLET THREE TIMES A  tablet 3       Allergies  Allergies   Allergen Reactions    Metformin Diarrhea         Family History  family history includes Breast Cancer in his mother; C.A.D. in his father; Cancer (age of onset: 64) in his father; Hypertension in his father.    Social History  Social History     Tobacco Use    Smoking status: Former     Types: Cigarettes     Passive exposure: Never    Smokeless tobacco: Former     Types: Chew    Tobacco comments:     quit in July 2006   Vaping Use    Vaping status: Never Used   Substance Use Topics    Alcohol use: Yes     Comment: daily to occ    Drug use: No       Physical Exam  BP (!) 145/100 (BP Location: Left arm, Patient Position: Chair, Cuff Size: Adult Large)   Pulse 99   Temp 98.1  F (36.7  C) (Tympanic)   Resp 16   Ht 1.899 m (6' 2.76\")   Wt 114.7 kg (252 lb 12.8 oz)   SpO2 96%   BMI 31.80 kg/m    Body mass index is 31.8 kg/m .  GENERAL :  In no apparent distress  SKIN: Normal color, normal temperature, texture.  No hirsutism, alopecia or purple striae.     EYES: PERRL, EOMI, No scleral icterus,  No proptosis, conjunctival redness, stare, retraction  RESP: Lungs clear to auscultation bilaterally  CARDIAC: Regular rate and " rhythm, normal S1 S2, without murmurs, rubs or gallops  NEURO: awake, alert, responds appropriately to questions.  Cranial nerves intact.   Moves all extremities; Gait normal.  No tremor of the outstretched hand.    EXTREMITIES: No clubbing, cyanosis or edema.

## 2024-07-02 NOTE — LETTER
7/2/2024      Bon Perkins  [id number] [address] [id number]      Dear Colleague,    Thank you for referring your patient, Bon Perkins, to the [facility]. Please see a copy of my visit note below.    Assessment/Plan :   Low testosterone. We discussed Bon's recent laboratory results. He has low testosterone. We also reviewed the signs and symptoms of low testosterone and I am hopeful that it will help improve his energy, libido, and general mood. We also reviewed the possible adverse effects. Lastly, we discussed the difference between topical testosterone therapy and injections. He has a very physical job and he is worried that topical therapy may not absorb appropriately. We will start with testosterone cypionate injections 0.5 ml every 10 days. We reviewed proper injection technique. I will send a new prescription to the [facility] in [place]. If he has any trouble filling the prescription he will reach out to our office. We will repeat laboratory testing in 2 mos and we will follow-up in 6 mos.       I have independently reviewed and interpreted labs, imaging as indicated.      Chief complaint:  Bon is a 48 year old male seen in consultation at the request of Dr. Carrillo for low testosterone.     I have reviewed Care Everywhere including [facility], [facility] [place] [facility], [facility], [facility], [facility] , [facility], [place] lab reports, imaging reports and provider notes as indicated.      HISTORY OF PRESENT ILLNESS  Bon noticed a significant decline in his overall energy over the last 8 yrs. He feels like he is tired all the time, despite getting a good night's sleep. He has also noticed that he feels more anxious and depressed, as well. He started venlafaxine a few years ago, which has helped but he still has episodes of anxiousness. He has also noticed that his libido has decreased, as well. He wouldn't say that he has erectile dysfunction but it  does take longer for him to achieve an erection. He mentioned this to his primary care provider and asked for his testosterone level to be checked. He was told that it was low.    Bon has a history of Type 2 DM, hyperlipidemia, and HTN. He states that he was really struggling with weight gain and poor diet about 5 yrs ago. He had gained weight and he just didn't feel good. He scheduled a follow-up with his primary care provider and he was diagnosed with diabetes. He reports that it was a wake up call for him to get back on track. Since that time he has been working hard to be healthier. He has been able to lose weight and his blood sugars have improved but he continues to struggle with fatigue and malaise.    Endocrine relevant labs are as follows:   Latest Reference Range & Units 02/06/24 09:18   Testosterone Total 240 - 950 ng/dL 196 (L)   (L): Data is abnormally low   Latest Reference Range & Units 12/22/23 10:23   Testosterone Total 240 - 950 ng/dL 186 (L)   (L): Data is abnormally low    REVIEW OF SYSTEMS    Endocrine: positive for diabetes and low testosterone  Skin: negative  Eyes: negative for, visual blurring, redness, tearing  Ears/Nose/Throat: negative  Respiratory: No shortness of breath, dyspnea on exertion, cough, or hemoptysis  Cardiovascular: positive for exercise intolerance and he gets fatigued easily, negative for, chest pain, dyspnea on exertion, and lower extremity edema  Gastrointestinal: negative for, nausea, vomiting, constipation, and diarrhea  Genitourinary: positive for erectile dysfunction and low libido, negative for, nocturia, dysuria, frequency, and urgency  Musculoskeletal: positive for muscular weakness, negative for, nocturnal cramping, and foot pain  Neurologic: negative for, local weakness, numbness or tingling of hands, and numbness or tingling of feet  Psychiatric: negative  Hematologic/Lymphatic/Immunologic: negative    Past Medical History  Past Medical History:   Diagnosis  "Date     Arthritis     gout       Medications  Current Outpatient Medications   Medication Sig Dispense Refill     allopurinol (ZYLOPRIM) 300 MG tablet TAKE 1 TABLET DAILY (NEEDS DIABETES APPOINTMENT FOR FURTHER REFILLS) 90 tablet 1     atenolol (TENORMIN) 50 MG tablet TAKE 1 TABLET DAILY 90 tablet 0     atorvastatin (LIPITOR) 20 MG tablet TAKE 1 TABLET DAILY 90 tablet 0     Continuous Blood Gluc Sensor (FREESTYLE WOJCIECH 14 DAY SENSOR) MISC CHANGE SENSOR EVERY 14 DAYS 6 kit 3     empagliflozin (JARDIANCE) 25 MG TABS tablet TAKE 1 TABLET DAILY 100 tablet 1     lisinopril (ZESTRIL) 40 MG tablet Take 1 tablet (40 mg) by mouth daily 90 tablet 1     omeprazole (PRILOSEC) 20 MG DR capsule TAKE 1 CAPSULE DAILY 90 capsule 2     Semaglutide, 2 MG/DOSE, (OZEMPIC) 8 MG/3ML pen Inject 2 mg Subcutaneous every 7 days 9 mL 3     sildenafil (VIAGRA) 50 MG tablet Take 1 tablet (50 mg) by mouth daily as needed (prior to intercourse.) 18 tablet 3     venlafaxine (EFFEXOR) 75 MG tablet TAKE 1 TABLET THREE TIMES A  tablet 3       Allergies  Allergies   Allergen Reactions     Metformin Diarrhea         Family History  family history includes Breast Cancer in his mother; C.A.D. in his father; Cancer (age of onset: 64) in his father; Hypertension in his father.    Social History  Social History     Tobacco Use     Smoking status: Former     Types: Cigarettes     Passive exposure: Never     Smokeless tobacco: Former     Types: Chew     Tobacco comments:     quit in July 2006   Vaping Use     Vaping status: Never Used   Substance Use Topics     Alcohol use: Yes     Comment: daily to occ     Drug use: No       Physical Exam  BP (!) 145/100 (BP Location: Left arm, Patient Position: Chair, Cuff Size: Adult Large)   Pulse 99   Temp 98.1  F (36.7  C) (Tympanic)   Resp 16   Ht 1.899 m (6' 2.76\")   Wt 114.7 kg (252 lb 12.8 oz)   SpO2 96%   BMI 31.80 kg/m    Body mass index is 31.8 kg/m .  GENERAL :  In no apparent distress  SKIN: " Normal color, normal temperature, texture.  No hirsutism, alopecia or purple striae.     EYES: PERRL, EOMI, No scleral icterus,  No proptosis, conjunctival redness, stare, retraction  RESP: Lungs clear to auscultation bilaterally  CARDIAC: Regular rate and rhythm, normal S1 S2, without murmurs, rubs or gallops  NEURO: awake, alert, responds appropriately to questions.  Cranial nerves intact.   Moves all extremities; Gait normal.  No tremor of the outstretched hand.    EXTREMITIES: No clubbing, cyanosis or edema.            Again, thank you for allowing me to participate in the care of your patient.        Sincerely,        Freya Cortés PA-C

## 2024-07-02 NOTE — PATIENT INSTRUCTIONS
[facility]  Dr Zamudio, Endocrinology Department    [address] [id number]  [place] [address]  Appointment [phone]  Fax: [phone]  [place]: Monday - Thursday

## 2024-07-16 ENCOUNTER — MYC REFILL (OUTPATIENT)
Dept: ENDOCRINOLOGY | Facility: CLINIC | Age: 49
End: 2024-07-16
Payer: COMMERCIAL

## 2024-07-16 DIAGNOSIS — R79.89 LOW TESTOSTERONE IN MALE: ICD-10-CM

## 2024-07-16 RX ORDER — TESTOSTERONE CYPIONATE 200 MG/ML
100 INJECTION, SOLUTION INTRAMUSCULAR
Qty: 2 ML | Refills: 0 | OUTPATIENT
Start: 2024-07-16

## 2024-07-24 DIAGNOSIS — R79.89 LOW TESTOSTERONE IN MALE: ICD-10-CM

## 2024-07-24 DIAGNOSIS — E78.2 MIXED HYPERLIPIDEMIA: ICD-10-CM

## 2024-07-24 RX ORDER — TESTOSTERONE CYPIONATE 200 MG/ML
100 INJECTION, SOLUTION INTRAMUSCULAR
Qty: 4 ML | Refills: 0 | Status: SHIPPED | OUTPATIENT
Start: 2024-07-24

## 2024-07-24 RX ORDER — ATORVASTATIN CALCIUM 20 MG/1
TABLET, FILM COATED ORAL
Qty: 90 TABLET | Refills: 3 | Status: SHIPPED | OUTPATIENT
Start: 2024-07-24

## 2024-07-24 NOTE — TELEPHONE ENCOUNTER
testosterone cypionate (DEPOTESTOSTERONE) 200 MG/ML injection 2 mL 0 7/2/2024 -- No   Sig - Route: Inject 0.5 mLs (100 mg) into the muscle every 10 days - Intramuscular     ----------------------  Last Office Visit : 7/2/2024  [facility] Office visit:  1/8/25    ----------------------      Routing refill request to provider for review/approval because:  Not on refill protocol: CONTROLLED MEDICATION.

## 2024-07-24 NOTE — TELEPHONE ENCOUNTER
Requested Prescriptions   Pending Prescriptions Disp Refills    atorvastatin (LIPITOR) 20 MG tablet [Pharmacy Med Name: ATORVASTATIN TABS 20MG] 90 tablet 3     Sig: TAKE 1 TABLET DAILY (DUE FOR FASTING LABS)       Antihyperlipidemic agents Failed - 7/24/2024 12:15 AM        Failed - LDL on file in the past 12 months        Passed - Medication is active on med list        Passed - Recent (12 mo) or future (90 days) visit within the authorizing provider's specialty     The patient must have completed an in-person or virtual visit within the past 12 months or has a future visit scheduled within the next 90 days with the authorizing provider s specialty.  Urgent care and e-visits do not quality as an office visit for this protocol.          Passed - Patient is age 18 years or older

## 2024-08-05 ENCOUNTER — TELEPHONE (OUTPATIENT)
Dept: ENDOCRINOLOGY | Facility: CLINIC | Age: 49
End: 2024-08-05
Payer: COMMERCIAL

## 2024-08-05 DIAGNOSIS — R79.89 LOW TESTOSTERONE IN MALE: Primary | ICD-10-CM

## 2024-08-05 RX ORDER — TESTOSTERONE CYPIONATE 200 MG/ML
100 INJECTION, SOLUTION INTRAMUSCULAR
Qty: 1 ML | Refills: 0 | Status: SHIPPED | OUTPATIENT
Start: 2024-08-05

## 2024-08-05 NOTE — TELEPHONE ENCOUNTER
[facility]!    Bon has been without his testosterone injection for almost two weeks due to mail order pharmacy delays. Would you be willing to send a prescription for a single fill of it to our pharmacy, so that we can get him this while he is waiting for the mail order supply to come?    Thank you.    Tea Rogel, PharmD  Staff Pharmacist  [facility]  [phone]

## 2024-08-13 DIAGNOSIS — K21.9 GASTROESOPHAGEAL REFLUX DISEASE WITHOUT ESOPHAGITIS: ICD-10-CM

## 2024-08-13 DIAGNOSIS — I10 ESSENTIAL HYPERTENSION WITH GOAL BLOOD PRESSURE LESS THAN 140/90: ICD-10-CM

## 2024-08-13 DIAGNOSIS — E11.9 TYPE 2 DIABETES MELLITUS WITHOUT COMPLICATION, WITHOUT LONG-TERM CURRENT USE OF INSULIN (H): ICD-10-CM

## 2024-08-13 RX ORDER — ATENOLOL 50 MG/1
TABLET ORAL
Qty: 90 TABLET | Refills: 3 | Status: SHIPPED | OUTPATIENT
Start: 2024-08-13

## 2024-08-13 RX ORDER — EMPAGLIFLOZIN 25 MG/1
25 TABLET, FILM COATED ORAL DAILY
Qty: 90 TABLET | Refills: 3 | Status: SHIPPED | OUTPATIENT
Start: 2024-08-13

## 2024-08-25 ENCOUNTER — MYC REFILL (OUTPATIENT)
Dept: ENDOCRINOLOGY | Facility: CLINIC | Age: 49
End: 2024-08-25
Payer: COMMERCIAL

## 2024-08-25 DIAGNOSIS — R79.89 LOW TESTOSTERONE IN MALE: ICD-10-CM

## 2024-09-13 ENCOUNTER — MYC REFILL (OUTPATIENT)
Dept: FAMILY MEDICINE | Facility: CLINIC | Age: 49
End: 2024-09-13
Payer: COMMERCIAL

## 2024-09-13 DIAGNOSIS — N52.9 ERECTILE DYSFUNCTION, UNSPECIFIED ERECTILE DYSFUNCTION TYPE: ICD-10-CM

## 2024-09-16 RX ORDER — SILDENAFIL 50 MG/1
50 TABLET, FILM COATED ORAL DAILY PRN
Qty: 18 TABLET | Refills: 3 | Status: SHIPPED | OUTPATIENT
Start: 2024-09-16

## 2024-10-09 ENCOUNTER — PATIENT OUTREACH (OUTPATIENT)
Dept: CARE COORDINATION | Facility: CLINIC | Age: 49
End: 2024-10-09
Payer: COMMERCIAL

## 2024-10-21 ENCOUNTER — PATIENT OUTREACH (OUTPATIENT)
Dept: CARE COORDINATION | Facility: CLINIC | Age: 49
End: 2024-10-21
Payer: COMMERCIAL

## 2024-11-02 ENCOUNTER — HEALTH MAINTENANCE LETTER (OUTPATIENT)
Age: 49
End: 2024-11-02

## 2024-11-04 ENCOUNTER — OFFICE VISIT (OUTPATIENT)
Dept: FAMILY MEDICINE | Facility: CLINIC | Age: 49
End: 2024-11-04
Payer: COMMERCIAL

## 2024-11-04 VITALS
HEART RATE: 92 BPM | RESPIRATION RATE: 16 BRPM | BODY MASS INDEX: 31.95 KG/M2 | HEIGHT: 75 IN | WEIGHT: 257 LBS | SYSTOLIC BLOOD PRESSURE: 158 MMHG | DIASTOLIC BLOOD PRESSURE: 100 MMHG | TEMPERATURE: 99 F | OXYGEN SATURATION: 99 %

## 2024-11-04 DIAGNOSIS — N18.1 TYPE 2 DIABETES MELLITUS WITH STAGE 1 CHRONIC KIDNEY DISEASE, WITHOUT LONG-TERM CURRENT USE OF INSULIN (H): Primary | ICD-10-CM

## 2024-11-04 DIAGNOSIS — I10 ESSENTIAL HYPERTENSION WITH GOAL BLOOD PRESSURE LESS THAN 140/90: ICD-10-CM

## 2024-11-04 DIAGNOSIS — E79.0 HYPERURICEMIA: ICD-10-CM

## 2024-11-04 DIAGNOSIS — E11.22 TYPE 2 DIABETES MELLITUS WITH STAGE 1 CHRONIC KIDNEY DISEASE, WITHOUT LONG-TERM CURRENT USE OF INSULIN (H): Primary | ICD-10-CM

## 2024-11-04 DIAGNOSIS — N18.1 CKD (CHRONIC KIDNEY DISEASE) STAGE 1, GFR 90 ML/MIN OR GREATER: ICD-10-CM

## 2024-11-04 DIAGNOSIS — R79.89 LOW TESTOSTERONE IN MALE: ICD-10-CM

## 2024-11-04 DIAGNOSIS — B35.4 TINEA CORPORIS: ICD-10-CM

## 2024-11-04 LAB
ALBUMIN SERPL BCG-MCNC: 4.5 G/DL (ref 3.5–5.2)
ALP SERPL-CCNC: 82 U/L (ref 40–150)
ALT SERPL W P-5'-P-CCNC: 64 U/L (ref 0–70)
ANION GAP SERPL CALCULATED.3IONS-SCNC: 14 MMOL/L (ref 7–15)
AST SERPL W P-5'-P-CCNC: 38 U/L (ref 0–45)
BILIRUB SERPL-MCNC: 0.5 MG/DL
BUN SERPL-MCNC: 18.9 MG/DL (ref 6–20)
CALCIUM SERPL-MCNC: 9.7 MG/DL (ref 8.8–10.4)
CHLORIDE SERPL-SCNC: 99 MMOL/L (ref 98–107)
CHOLEST SERPL-MCNC: 223 MG/DL
CREAT SERPL-MCNC: 0.76 MG/DL (ref 0.67–1.17)
CREAT UR-MCNC: 67.4 MG/DL
EGFRCR SERPLBLD CKD-EPI 2021: >90 ML/MIN/1.73M2
ERYTHROCYTE [DISTWIDTH] IN BLOOD BY AUTOMATED COUNT: 13 % (ref 10–15)
EST. AVERAGE GLUCOSE BLD GHB EST-MCNC: 166 MG/DL
FASTING STATUS PATIENT QL REPORTED: NO
FASTING STATUS PATIENT QL REPORTED: NO
GLUCOSE SERPL-MCNC: 200 MG/DL (ref 70–99)
HBA1C MFR BLD: 7.4 % (ref 0–5.6)
HCO3 SERPL-SCNC: 26 MMOL/L (ref 22–29)
HCT VFR BLD AUTO: 54.7 % (ref 40–53)
HDLC SERPL-MCNC: 49 MG/DL
HGB BLD-MCNC: 18.3 G/DL (ref 13.3–17.7)
LDLC SERPL CALC-MCNC: 110 MG/DL
MCH RBC QN AUTO: 29.8 PG (ref 26.5–33)
MCHC RBC AUTO-ENTMCNC: 33.5 G/DL (ref 31.5–36.5)
MCV RBC AUTO: 89 FL (ref 78–100)
MICROALBUMIN UR-MCNC: 45.3 MG/L
MICROALBUMIN/CREAT UR: 67.21 MG/G CR (ref 0–17)
NONHDLC SERPL-MCNC: 174 MG/DL
PLATELET # BLD AUTO: 145 10E3/UL (ref 150–450)
POTASSIUM SERPL-SCNC: 4 MMOL/L (ref 3.4–5.3)
PROT SERPL-MCNC: 7.8 G/DL (ref 6.4–8.3)
RBC # BLD AUTO: 6.15 10E6/UL (ref 4.4–5.9)
SODIUM SERPL-SCNC: 139 MMOL/L (ref 135–145)
TRIGL SERPL-MCNC: 322 MG/DL
WBC # BLD AUTO: 9.1 10E3/UL (ref 4–11)

## 2024-11-04 PROCEDURE — 84270 ASSAY OF SEX HORMONE GLOBUL: CPT | Performed by: FAMILY MEDICINE

## 2024-11-04 PROCEDURE — 82570 ASSAY OF URINE CREATININE: CPT | Performed by: FAMILY MEDICINE

## 2024-11-04 PROCEDURE — 99214 OFFICE O/P EST MOD 30 MIN: CPT | Performed by: FAMILY MEDICINE

## 2024-11-04 PROCEDURE — 85027 COMPLETE CBC AUTOMATED: CPT | Performed by: FAMILY MEDICINE

## 2024-11-04 PROCEDURE — G2211 COMPLEX E/M VISIT ADD ON: HCPCS | Performed by: FAMILY MEDICINE

## 2024-11-04 PROCEDURE — 83036 HEMOGLOBIN GLYCOSYLATED A1C: CPT | Performed by: FAMILY MEDICINE

## 2024-11-04 PROCEDURE — 80053 COMPREHEN METABOLIC PANEL: CPT | Performed by: FAMILY MEDICINE

## 2024-11-04 PROCEDURE — 36415 COLL VENOUS BLD VENIPUNCTURE: CPT | Performed by: FAMILY MEDICINE

## 2024-11-04 PROCEDURE — 80061 LIPID PANEL: CPT | Performed by: FAMILY MEDICINE

## 2024-11-04 PROCEDURE — 82043 UR ALBUMIN QUANTITATIVE: CPT | Performed by: FAMILY MEDICINE

## 2024-11-04 PROCEDURE — 84403 ASSAY OF TOTAL TESTOSTERONE: CPT | Performed by: FAMILY MEDICINE

## 2024-11-04 RX ORDER — PRENATAL VIT 91/IRON/FOLIC/DHA 28-975-200
COMBINATION PACKAGE (EA) ORAL 2 TIMES DAILY
Qty: 42 G | Refills: 0 | Status: SHIPPED | OUTPATIENT
Start: 2024-11-04

## 2024-11-04 RX ORDER — SYRINGE W-NEEDLE,DISPOSAB,3 ML 25GX5/8"
1 SYRINGE, EMPTY DISPOSABLE MISCELLANEOUS
Qty: 50 EACH | Refills: 3 | Status: SHIPPED | OUTPATIENT
Start: 2024-11-04

## 2024-11-04 RX ORDER — ALLOPURINOL 300 MG/1
300 TABLET ORAL DAILY
Qty: 90 TABLET | Refills: 3 | Status: SHIPPED | OUTPATIENT
Start: 2024-11-04

## 2024-11-04 RX ORDER — LISINOPRIL 40 MG/1
40 TABLET ORAL DAILY
Qty: 90 TABLET | Refills: 3 | Status: CANCELLED | OUTPATIENT
Start: 2024-11-04

## 2024-11-04 RX ORDER — LOSARTAN POTASSIUM AND HYDROCHLOROTHIAZIDE 12.5; 5 MG/1; MG/1
1 TABLET ORAL DAILY
Qty: 90 TABLET | Refills: 0 | Status: SHIPPED | OUTPATIENT
Start: 2024-11-04 | End: 2025-02-02

## 2024-11-04 ASSESSMENT — PAIN SCALES - GENERAL: PAINLEVEL_OUTOF10: NO PAIN (0)

## 2024-11-04 NOTE — PROGRESS NOTES
"  Assessment & Plan     Essential hypertension with goal blood pressure less than 140/90  Not well controled  Recommend lower sodium diet  Switch anti hypertensive to a combined antihypertensive  Follow up in 2 months  Recommend RN bp check  - losartan-hydrochlorothiazide (HYZAAR) 50-12.5 MG tablet; Take 1 tablet by mouth daily.  - CBC with platelets and differential; Future    Type 2 diabetes mellitus with stage 1 chronic kidney disease, without long-term current use of insulin (H)  Discussed life style changes  Orders as below for monitoring of T2DM  Continue current management  - Lipid panel reflex to direct LDL Non-fasting; Future  - HEMOGLOBIN A1C; Future  - Semaglutide, 2 MG/DOSE, (OZEMPIC) 8 MG/3ML pen; Inject 2 mg subcutaneously every 7 days.  - HEMOGLOBIN A1C  - Lipid panel reflex to direct LDL Non-fasting    CKD (chronic kidney disease) stage 1, GFR 90 ml/min or greater  stable  - Albumin Random Urine Quantitative with Creat Ratio; Future  - Albumin Random Urine Quantitative with Creat Ratio  - CBC with platelets and differential; Future    Hyperuricemia  stable  - allopurinol (ZYLOPRIM) 300 MG tablet; Take 1 tablet (300 mg) by mouth daily.    Low testosterone in male  Follows with endocrine  - Needle, Disp, 25G X 1\" MISC; 1 each every 10 days.  - syringe 23G X 1\" 3 ML MISC; 1 each every 10 days.  - Testosterone Free and Total; Future  - CBC with platelets; Future  - Comprehensive metabolic panel (BMP + Alb, Alk Phos, ALT, AST, Total. Bili, TP); Future  - Comprehensive metabolic panel (BMP + Alb, Alk Phos, ALT, AST, Total. Bili, TP)  - CBC with platelets  - Testosterone Free and Total    Tinea corporis  Trial lamisil, if no improvement follow up with dermatology  Multiple areas of non pruritic erythematous circular rash on the torso, that has worsened over the past coupe of months  - terbinafine (LAMISIL) 1 % external cream; Apply topically 2 times daily.  - Adult Dermatology  Referral; " "Future    The longitudinal plan of care for the diagnosis(es)/condition(s) as documented were addressed during this visit. Due to the added complexity in care, I will continue to support Bon in the subsequent management and with ongoing continuity of care.       BMI  Estimated body mass index is 32.33 kg/m  as calculated from the following:    Height as of this encounter: 1.899 m (6' 2.76\").    Weight as of this encounter: 116.6 kg (257 lb).             Frank Crockett is a 49 year old, presenting for the following health issues:  Diabetes and Medication Reconciliation (No lisinopril for past several weeks, took his atenolol because he ran out.)        11/4/2024     3:36 PM   Additional Questions   Roomed by Nurys LEWIS     History of Present Illness       Diabetes:   He presents for follow up of diabetes.  He is checking home blood glucose three times daily.   He checks blood glucose before and after meals.  Blood glucose is sometimes over 200 and never under 70.  When his blood glucose is low, the patient is asymptomatic for confusion, blurred vision, lethargy and reports not feeling dizzy, shaky, or weak.  He is concerned about blood sugar frequently over 200.   He is having numbness in feet, burning in feet and blurry vision.            Hypertension: He presents for follow up of hypertension.  He does not check blood pressure  regularly outside of the clinic. Outside blood pressures have been over 140/90. He follows a low salt diet.     He eats 0-1 servings of fruits and vegetables daily.He consumes 0 sweetened beverage(s) daily.He exercises with enough effort to increase his heart rate 9 or less minutes per day.  He exercises with enough effort to increase his heart rate 3 or less days per week.   He is taking medications regularly.                     Objective    BP (!) 158/100   Pulse 92   Temp 99  F (37.2  C) (Tympanic)   Resp 16   Ht 1.899 m (6' 2.76\")   Wt 116.6 kg (257 lb)   SpO2 99%   BMI 32.33 " kg/m    Body mass index is 32.33 kg/m .  Physical Exam  Skin:     Comments: Multiple areas of clusters of non pruritic erythematous circular rash on the torso                    Signed Electronically by: Vanessa Carrillo MD

## 2024-11-05 LAB — SHBG SERPL-SCNC: 19 NMOL/L (ref 11–80)

## 2024-11-07 LAB
TESTOST FREE SERPL-MCNC: 8.88 NG/DL
TESTOST SERPL-MCNC: 337 NG/DL (ref 240–950)

## 2024-11-08 ENCOUNTER — MYC REFILL (OUTPATIENT)
Dept: ENDOCRINOLOGY | Facility: CLINIC | Age: 49
End: 2024-11-08
Payer: COMMERCIAL

## 2024-11-08 DIAGNOSIS — R79.89 LOW TESTOSTERONE IN MALE: ICD-10-CM

## 2024-11-08 RX ORDER — TESTOSTERONE CYPIONATE 200 MG/ML
100 INJECTION, SOLUTION INTRAMUSCULAR
Qty: 4 ML | Refills: 0 | OUTPATIENT
Start: 2024-11-08

## 2024-11-08 NOTE — TELEPHONE ENCOUNTER
Per 7/2/24 OV:  We will start with testosterone cypionate injections 0.5 ml every 10 days.     Please verify dose frequency.  RX pended    Labs 11/4:  Testosterone 337

## 2024-11-13 RX ORDER — TESTOSTERONE CYPIONATE 200 MG/ML
100 INJECTION, SOLUTION INTRAMUSCULAR
Qty: 1 ML | Refills: 0 | Status: SHIPPED | OUTPATIENT
Start: 2024-11-13

## 2024-11-27 ENCOUNTER — MYC REFILL (OUTPATIENT)
Dept: ENDOCRINOLOGY | Facility: CLINIC | Age: 49
End: 2024-11-27
Payer: COMMERCIAL

## 2024-11-27 DIAGNOSIS — R79.89 LOW TESTOSTERONE IN MALE: ICD-10-CM

## 2024-11-27 RX ORDER — TESTOSTERONE CYPIONATE 200 MG/ML
100 INJECTION, SOLUTION INTRAMUSCULAR
Qty: 2 ML | Refills: 0 | Status: SHIPPED | OUTPATIENT
Start: 2024-11-27

## 2024-11-27 NOTE — TELEPHONE ENCOUNTER
Please advise Rx sent on 11/13/24 for one month supply only.     Rx Pended for 2 months supplies  Current doses 0.5 mL every 14 days    FUO: 1/8/25    Per 7/2/24 OV:  We will start with testosterone cypionate injections 0.5 ml every 10 days.      Please verify dose frequency.  RX pended

## 2024-12-16 ENCOUNTER — MYC REFILL (OUTPATIENT)
Dept: FAMILY MEDICINE | Facility: CLINIC | Age: 49
End: 2024-12-16
Payer: COMMERCIAL

## 2024-12-16 DIAGNOSIS — E11.22 TYPE 2 DIABETES MELLITUS WITH STAGE 1 CHRONIC KIDNEY DISEASE, WITHOUT LONG-TERM CURRENT USE OF INSULIN (H): ICD-10-CM

## 2024-12-16 DIAGNOSIS — N18.1 TYPE 2 DIABETES MELLITUS WITH STAGE 1 CHRONIC KIDNEY DISEASE, WITHOUT LONG-TERM CURRENT USE OF INSULIN (H): ICD-10-CM

## 2024-12-16 DIAGNOSIS — N52.9 ERECTILE DYSFUNCTION, UNSPECIFIED ERECTILE DYSFUNCTION TYPE: ICD-10-CM

## 2024-12-17 RX ORDER — SILDENAFIL 50 MG/1
50 TABLET, FILM COATED ORAL DAILY PRN
Qty: 18 TABLET | Refills: 3 | Status: SHIPPED | OUTPATIENT
Start: 2024-12-17

## 2024-12-17 RX ORDER — FLASH GLUCOSE SENSOR
KIT MISCELLANEOUS
Qty: 6 EACH | Refills: 3 | Status: SHIPPED | OUTPATIENT
Start: 2024-12-17

## 2025-01-08 ENCOUNTER — VIRTUAL VISIT (OUTPATIENT)
Dept: ENDOCRINOLOGY | Facility: CLINIC | Age: 50
End: 2025-01-08
Payer: COMMERCIAL

## 2025-01-08 DIAGNOSIS — R79.89 LOW TESTOSTERONE IN MALE: ICD-10-CM

## 2025-01-08 RX ORDER — SYRINGE W-NEEDLE,DISPOSAB,3 ML 25GX5/8"
1 SYRINGE, EMPTY DISPOSABLE MISCELLANEOUS
Qty: 50 EACH | Refills: 3 | Status: SHIPPED | OUTPATIENT
Start: 2025-01-08

## 2025-01-08 RX ORDER — TESTOSTERONE CYPIONATE 200 MG/ML
150 INJECTION, SOLUTION INTRAMUSCULAR
Qty: 4 ML | Refills: 5 | Status: SHIPPED | OUTPATIENT
Start: 2025-01-08

## 2025-01-08 NOTE — LETTER
1/8/2025      Bon Perkins  [address] [facility] [address]      Dear Colleague,    Thank you for referring your patient, Bon Perkins, to the [facility]. Please see a copy of my visit note below.    Assessment/Plan :   Low Testosterone. Bon has not noticed any change since starting IM testosterone therapy. He is still tired and he has not notice any change in mood. He has not had any problems with the injections, he is just not feeling any different. We reviewed his recent laboratory results and his total testosterone has improved, slightly. We will increase his dose to 150 mg every 2 wks. We will then repeat laboratory testing in 3 mos. If he has any problems with the increased dose, he will contact our office.     Due to the COVID 19 pandemic this visit was a telephone/video visit in order to help prevent spread of infection in this patient and the general population. The patient gave verbal consent for the visit today. I have independently reviewed and interpreted labs, imaging as indicated.     {PROVIDER LOCATION On-site should be selected for visits conducted from your clinic location or adjoining [facility] hospital, academic office, or other nearby [facility] building. Off-site should be selected for all other provider locations, including home:[id number]}  Distant Location (provider location):  Off-site  Mode of Communication:  Video Conference via [employer]  Chart review/prep time 5    Joined the call at 1/8/2025, 9:55:57 am.  Left the call at 1/8/2025, 10:04:51 am.  You were on the call for 8 minutes 54 seconds .  18 minutes spent on the date of the encounter doing chart review, history and exam, documentation and further activities as noted above.      Chief complaint:  Bon is a 49 year old male who returns for follow-up of low testosterone.    I have reviewed Care Everywhere including [facility], [facility], [facility], [facility], [place], [facility], [facility] , [facility],  [place] lab reports, imaging reports and provider notes as indicated.      HISTORY OF PRESENT ILLNESS  Bon has not noticed any change since starting testosterone. He is currently injecting 100 mg/ml every 2 wks. He has not had any problems with the injections. He did develop some bruising when he used his thigh, so he has switched to injecting in his gluteus. The injections seem to be going well but he doesn't feel any different. He continues to struggle with fatigue and he has not noticed any change in his libido or ED.    Bon noticed a significant decline in his overall energy over the last 8 yrs. He feels like he is tired all the time, despite getting a good night's sleep. He has also noticed that he feels more anxious and depressed, as well. He started venlafaxine a few years ago, which has helped but he still has episodes of anxiousness. He has also noticed that his libido has decreased, as well. He mentioned this to his PCP and laboratory testing found him to have low testosterone. We started him on testosterone cypionate injections in July of 2024. He also has a history of Type 2 DM, hyperlipidemia, and HTN.     Endocrine relevant labs are as follows:   Latest Reference Range & Units 11/04/24 16:41   Testosterone Total 240 - 950 ng/dL 337      Latest Reference Range & Units 11/04/24 16:41   Free Testosterone Calculated ng/dL 8.88      Latest Reference Range & Units 02/06/24 09:18   Testosterone Total 240 - 950 ng/dL 196 (L)   (L): Data is abnormally low   Latest Reference Range & Units 12/22/23 10:23   Testosterone Total 240 - 950 ng/dL 186 (L)   (L): Data is abnormally low    REVIEW OF SYSTEMS    Endocrine: positive for diabetes, obesity, and low testosterone  Skin: negative for, acne on face  Eyes: negative for, visual blurring  Ears/Nose/Throat: negative  Respiratory: No shortness of breath, dyspnea on exertion, cough, or hemoptysis  Cardiovascular: negative for, chest pain, and exercise  "intolerance  Gastrointestinal: negative for, nausea, vomiting, constipation, and diarrhea  Genitourinary: positive for erectile dysfunction, negative for, nocturia, dysuria, frequency, and urgency  Musculoskeletal: positive for muscular weakness, negative for, nocturnal cramping, and foot pain  Neurologic: negative  Psychiatric: negative  Hematologic/Lymphatic/Immunologic: negative    Past Medical History  Past Medical History:   Diagnosis Date     Arthritis     gout       Medications  Current Outpatient Medications   Medication Sig Dispense Refill     allopurinol (ZYLOPRIM) 300 MG tablet Take 1 tablet (300 mg) by mouth daily. 90 tablet 3     atenolol (TENORMIN) 50 MG tablet TAKE 1 TABLET DAILY (NEEDS FOLLOW UP APPOINTMENT FOR FURTHER REFILLS) (Patient not taking: Reported on 11/4/2024) 90 tablet 3     atorvastatin (LIPITOR) 20 MG tablet TAKE 1 TABLET DAILY (DUE FOR FASTING LABS) (Patient not taking: Reported on 11/4/2024) 90 tablet 3     Continuous Glucose Sensor (FREESTYLE WOJCIECH 14 DAY SENSOR) MISC CHANGE SENSOR EVERY 14 DAYS 6 each 3     JARDIANCE 25 MG TABS tablet TAKE 1 TABLET DAILY 90 tablet 3     losartan-hydrochlorothiazide (HYZAAR) 50-12.5 MG tablet Take 1 tablet by mouth daily. 90 tablet 0     Needle, Disp, 25G X 1\" MISC 1 each every 10 days. 50 each 3     omeprazole (PRILOSEC) 20 MG DR capsule TAKE 1 CAPSULE DAILY 90 capsule 3     Semaglutide, 2 MG/DOSE, (OZEMPIC) 8 MG/3ML pen Inject 2 mg subcutaneously every 7 days. 9 mL 3     sildenafil (VIAGRA) 50 MG tablet Take 1 tablet (50 mg) by mouth daily as needed (prior to intercourse.). 18 tablet 3     syringe 23G X 1\" 3 ML MISC 1 each every 10 days. 50 each 3     terbinafine (LAMISIL) 1 % external cream Apply topically 2 times daily. 42 g 0     testosterone cypionate (DEPOTESTOSTERONE) 200 MG/ML injection Inject 0.5 mLs (100 mg) into the muscle every 14 days. 2 mL 0     testosterone cypionate (DEPOTESTOSTERONE) 200 MG/ML injection Inject 0.5 mLs (100 mg) into " the muscle every 14 days 4 mL 0     venlafaxine (EFFEXOR) 75 MG tablet TAKE 1 TABLET THREE TIMES A DAY (Patient taking differently: Take 75 mg by mouth daily.) 270 tablet 3       Allergies  Allergies   Allergen Reactions     Metformin Diarrhea       Family History  family history includes Breast Cancer in his mother; C.A.D. in his father; Cancer (age of onset: 64) in his father; Hypertension in his father.    Social History  Social History     Tobacco Use     Smoking status: Former     Types: Cigarettes     Passive exposure: Never     Smokeless tobacco: Former     Types: Chew     Tobacco comments:     quit in July 2006   Vaping Use     Vaping status: Never Used   Substance Use Topics     Alcohol use: Yes     Comment: daily to occ     Drug use: No       Physical Exam  There is no height or weight on file to calculate BMI.  GENERAL: no distress  SKIN: Visible skin clear. No significant rash, abnormal pigmentation or lesions.  EYES: Eyes grossly normal to inspection.  No discharge or erythema, or obvious scleral/conjunctival abnormalities.  NECK: visible goiter is not present; no visible neck masses  RESP: No audible wheeze, cough, or visible cyanosis.  No visible retractions or increased work of breathing.    NEURO: Awake, alert, responds appropriately to questions.  Mentation and speech fluent.  PSYCH:affect normal and appearance well-groomed.                Again, thank you for allowing me to participate in the care of your patient.        Sincerely,        Freya Cortés PA-C    Electronically signed

## 2025-01-08 NOTE — NURSING NOTE
Current patient location: [place]    Is the patient currently in the state of [place]? YES    Visit mode:VIDEO    If the visit is dropped, the patient can be reconnected by:VIDEO VISIT: Text to cell phone:   Telephone Information:   Mobile [phone]       Will anyone else be joining the visit? NO  (If patient encounters technical issues they should call [phone])    Are changes needed to the allergy or medication list? No  Bon reported no changes to e-check in information for visit. VF did not review e-check in information again with Bon due to this.       Are refills needed on medications prescribed by this physician? YES, testosterone and supplies.    Rooming Documentation:  Not applicable    Reason for visit: RECHECK    Marissa SHELTON

## 2025-01-08 NOTE — PROGRESS NOTES
Assessment/Plan :   Low Testosterone. Bon has not noticed any change since starting IM testosterone therapy. He is still tired and he has not notice any change in mood. He has not had any problems with the injections, he is just not feeling any different. We reviewed his recent laboratory results and his total testosterone has improved, slightly. We will increase his dose to 150 mg every 2 wks. We will then repeat laboratory testing in 3 mos. If he has any problems with the increased dose, he will contact our office.     Due to the COVID 19 pandemic this visit was a telephone/video visit in order to help prevent spread of infection in this patient and the general population. The patient gave verbal consent for the visit today. I have independently reviewed and interpreted labs, imaging as indicated.       Distant Location (provider location):  Off-site  Mode of Communication:  Video Conference via [employer]  Chart review/prep time 5    Joined the call at 1/8/2025, 9:55:57 am.  Left the call at 1/8/2025, 10:04:51 am.  You were on the call for 8 minutes 54 seconds .  18 minutes spent on the date of the encounter doing chart review, history and exam, documentation and further activities as noted above.      Chief complaint:  Bon is a 49 year old male who returns for follow-up of low testosterone.    I have reviewed Care Everywhere including [facility], [facility] [place] [facility], [facility], [facility], [facility] , [facility], [place] lab reports, imaging reports and provider notes as indicated.      HISTORY OF PRESENT ILLNESS  Bon has not noticed any change since starting testosterone. He is currently injecting 100 mg/ml every 2 wks. He has not had any problems with the injections. He did develop some bruising when he used his thigh, so he has switched to injecting in his gluteus. The injections seem to be going well but he doesn't feel any different. He continues to struggle with fatigue and he has not  noticed any change in his libido or ED.    Bon noticed a significant decline in his overall energy over the last 8 yrs. He feels like he is tired all the time, despite getting a good night's sleep. He has also noticed that he feels more anxious and depressed, as well. He started venlafaxine a few years ago, which has helped but he still has episodes of anxiousness. He has also noticed that his libido has decreased, as well. He mentioned this to his PCP and laboratory testing found him to have low testosterone. We started him on testosterone cypionate injections in July of 2024. He also has a history of Type 2 DM, hyperlipidemia, and HTN.     Endocrine relevant labs are as follows:   Latest Reference Range & Units 11/04/24 16:41   Testosterone Total 240 - 950 ng/dL 337      Latest Reference Range & Units 11/04/24 16:41   Free Testosterone Calculated ng/dL 8.88      Latest Reference Range & Units 02/06/24 09:18   Testosterone Total 240 - 950 ng/dL 196 (L)   (L): Data is abnormally low   Latest Reference Range & Units 12/22/23 10:23   Testosterone Total 240 - 950 ng/dL 186 (L)   (L): Data is abnormally low    REVIEW OF SYSTEMS    Endocrine: positive for diabetes, obesity, and low testosterone  Skin: negative for, acne on face  Eyes: negative for, visual blurring  Ears/Nose/Throat: negative  Respiratory: No shortness of breath, dyspnea on exertion, cough, or hemoptysis  Cardiovascular: negative for, chest pain, and exercise intolerance  Gastrointestinal: negative for, nausea, vomiting, constipation, and diarrhea  Genitourinary: positive for erectile dysfunction, negative for, nocturia, dysuria, frequency, and urgency  Musculoskeletal: positive for muscular weakness, negative for, nocturnal cramping, and foot pain  Neurologic: negative  Psychiatric: negative  Hematologic/Lymphatic/Immunologic: negative    Past Medical History  Past Medical History:   Diagnosis Date    Arthritis     gout       Medications  Current  "Outpatient Medications   Medication Sig Dispense Refill    allopurinol (ZYLOPRIM) 300 MG tablet Take 1 tablet (300 mg) by mouth daily. 90 tablet 3    atenolol (TENORMIN) 50 MG tablet TAKE 1 TABLET DAILY (NEEDS FOLLOW UP APPOINTMENT FOR FURTHER REFILLS) (Patient not taking: Reported on 11/4/2024) 90 tablet 3    atorvastatin (LIPITOR) 20 MG tablet TAKE 1 TABLET DAILY (DUE FOR FASTING LABS) (Patient not taking: Reported on 11/4/2024) 90 tablet 3    Continuous Glucose Sensor (FREESTYLE WOJCIECH 14 DAY SENSOR) MISC CHANGE SENSOR EVERY 14 DAYS 6 each 3    JARDIANCE 25 MG TABS tablet TAKE 1 TABLET DAILY 90 tablet 3    losartan-hydrochlorothiazide (HYZAAR) 50-12.5 MG tablet Take 1 tablet by mouth daily. 90 tablet 0    Needle, Disp, 25G X 1\" MISC 1 each every 10 days. 50 each 3    omeprazole (PRILOSEC) 20 MG DR capsule TAKE 1 CAPSULE DAILY 90 capsule 3    Semaglutide, 2 MG/DOSE, (OZEMPIC) 8 MG/3ML pen Inject 2 mg subcutaneously every 7 days. 9 mL 3    sildenafil (VIAGRA) 50 MG tablet Take 1 tablet (50 mg) by mouth daily as needed (prior to intercourse.). 18 tablet 3    syringe 23G X 1\" 3 ML MISC 1 each every 10 days. 50 each 3    terbinafine (LAMISIL) 1 % external cream Apply topically 2 times daily. 42 g 0    testosterone cypionate (DEPOTESTOSTERONE) 200 MG/ML injection Inject 0.5 mLs (100 mg) into the muscle every 14 days. 2 mL 0    testosterone cypionate (DEPOTESTOSTERONE) 200 MG/ML injection Inject 0.5 mLs (100 mg) into the muscle every 14 days 4 mL 0    venlafaxine (EFFEXOR) 75 MG tablet TAKE 1 TABLET THREE TIMES A DAY (Patient taking differently: Take 75 mg by mouth daily.) 270 tablet 3       Allergies  Allergies   Allergen Reactions    Metformin Diarrhea       Family History  family history includes Breast Cancer in his mother; C.A.D. in his father; Cancer (age of onset: 64) in his father; Hypertension in his father.    Social History  Social History     Tobacco Use    Smoking status: Former     Types: Cigarettes     " Passive exposure: Never    Smokeless tobacco: Former     Types: Chew    Tobacco comments:     quit in July 2006   Vaping Use    Vaping status: Never Used   Substance Use Topics    Alcohol use: Yes     Comment: daily to occ    Drug use: No       Physical Exam  There is no height or weight on file to calculate BMI.  GENERAL: no distress  SKIN: Visible skin clear. No significant rash, abnormal pigmentation or lesions.  EYES: Eyes grossly normal to inspection.  No discharge or erythema, or obvious scleral/conjunctival abnormalities.  NECK: visible goiter is not present; no visible neck masses  RESP: No audible wheeze, cough, or visible cyanosis.  No visible retractions or increased work of breathing.    NEURO: Awake, alert, responds appropriately to questions.  Mentation and speech fluent.  PSYCH:affect normal and appearance well-groomed.

## 2025-02-10 ENCOUNTER — MYC REFILL (OUTPATIENT)
Dept: FAMILY MEDICINE | Facility: CLINIC | Age: 50
End: 2025-02-10
Payer: COMMERCIAL

## 2025-02-10 DIAGNOSIS — N18.1 TYPE 2 DIABETES MELLITUS WITH STAGE 1 CHRONIC KIDNEY DISEASE, WITHOUT LONG-TERM CURRENT USE OF INSULIN (H): ICD-10-CM

## 2025-02-10 DIAGNOSIS — E11.22 TYPE 2 DIABETES MELLITUS WITH STAGE 1 CHRONIC KIDNEY DISEASE, WITHOUT LONG-TERM CURRENT USE OF INSULIN (H): ICD-10-CM

## 2025-02-10 DIAGNOSIS — R79.89 LOW TESTOSTERONE IN MALE: ICD-10-CM

## 2025-02-13 ENCOUNTER — MYC REFILL (OUTPATIENT)
Dept: FAMILY MEDICINE | Facility: CLINIC | Age: 50
End: 2025-02-13
Payer: COMMERCIAL

## 2025-02-13 DIAGNOSIS — R79.89 LOW TESTOSTERONE IN MALE: ICD-10-CM

## 2025-02-14 ENCOUNTER — MYC REFILL (OUTPATIENT)
Dept: ENDOCRINOLOGY | Facility: CLINIC | Age: 50
End: 2025-02-14
Payer: COMMERCIAL

## 2025-02-14 DIAGNOSIS — R79.89 LOW TESTOSTERONE IN MALE: ICD-10-CM

## 2025-02-18 RX ORDER — TESTOSTERONE CYPIONATE 200 MG/ML
150 INJECTION, SOLUTION INTRAMUSCULAR
Qty: 4 ML | Refills: 5 | Status: SHIPPED | OUTPATIENT
Start: 2025-02-18

## 2025-02-19 ENCOUNTER — ALLIED HEALTH/NURSE VISIT (OUTPATIENT)
Dept: FAMILY MEDICINE | Facility: CLINIC | Age: 50
End: 2025-02-19
Payer: COMMERCIAL

## 2025-02-19 ENCOUNTER — LAB (OUTPATIENT)
Dept: LAB | Facility: CLINIC | Age: 50
End: 2025-02-19
Payer: COMMERCIAL

## 2025-02-19 VITALS — SYSTOLIC BLOOD PRESSURE: 134 MMHG | HEART RATE: 81 BPM | DIASTOLIC BLOOD PRESSURE: 100 MMHG

## 2025-02-19 DIAGNOSIS — N18.1 TYPE 2 DIABETES MELLITUS WITH STAGE 1 CHRONIC KIDNEY DISEASE, WITHOUT LONG-TERM CURRENT USE OF INSULIN (H): ICD-10-CM

## 2025-02-19 DIAGNOSIS — R79.89 LOW TESTOSTERONE IN MALE: ICD-10-CM

## 2025-02-19 DIAGNOSIS — E11.22 TYPE 2 DIABETES MELLITUS WITH STAGE 1 CHRONIC KIDNEY DISEASE, WITHOUT LONG-TERM CURRENT USE OF INSULIN (H): ICD-10-CM

## 2025-02-19 DIAGNOSIS — E66.812 CLASS 2 SEVERE OBESITY WITH SERIOUS COMORBIDITY AND BODY MASS INDEX (BMI) OF 35.0 TO 35.9 IN ADULT, UNSPECIFIED OBESITY TYPE (H): ICD-10-CM

## 2025-02-19 DIAGNOSIS — I10 ESSENTIAL HYPERTENSION WITH GOAL BLOOD PRESSURE LESS THAN 140/90: Primary | ICD-10-CM

## 2025-02-19 DIAGNOSIS — E66.01 CLASS 2 SEVERE OBESITY WITH SERIOUS COMORBIDITY AND BODY MASS INDEX (BMI) OF 35.0 TO 35.9 IN ADULT, UNSPECIFIED OBESITY TYPE (H): ICD-10-CM

## 2025-02-19 LAB
ALBUMIN SERPL BCG-MCNC: 4.3 G/DL (ref 3.5–5.2)
ALP SERPL-CCNC: 97 U/L (ref 40–150)
ALT SERPL W P-5'-P-CCNC: 73 U/L (ref 0–70)
ANION GAP SERPL CALCULATED.3IONS-SCNC: 12 MMOL/L (ref 7–15)
AST SERPL W P-5'-P-CCNC: 43 U/L (ref 0–45)
BILIRUB SERPL-MCNC: 0.5 MG/DL
BUN SERPL-MCNC: 20.1 MG/DL (ref 6–20)
CALCIUM SERPL-MCNC: 9.6 MG/DL (ref 8.8–10.4)
CHLORIDE SERPL-SCNC: 104 MMOL/L (ref 98–107)
CREAT SERPL-MCNC: 0.7 MG/DL (ref 0.67–1.17)
EGFRCR SERPLBLD CKD-EPI 2021: >90 ML/MIN/1.73M2
ERYTHROCYTE [DISTWIDTH] IN BLOOD BY AUTOMATED COUNT: 12.7 % (ref 10–15)
GLUCOSE SERPL-MCNC: 252 MG/DL (ref 70–99)
HCO3 SERPL-SCNC: 25 MMOL/L (ref 22–29)
HCT VFR BLD AUTO: 52.6 % (ref 40–53)
HGB BLD-MCNC: 17.7 G/DL (ref 13.3–17.7)
MCH RBC QN AUTO: 30 PG (ref 26.5–33)
MCHC RBC AUTO-ENTMCNC: 33.7 G/DL (ref 31.5–36.5)
MCV RBC AUTO: 89 FL (ref 78–100)
PLATELET # BLD AUTO: 134 10E3/UL (ref 150–450)
POTASSIUM SERPL-SCNC: 4.2 MMOL/L (ref 3.4–5.3)
PROT SERPL-MCNC: 7.3 G/DL (ref 6.4–8.3)
RBC # BLD AUTO: 5.9 10E6/UL (ref 4.4–5.9)
SODIUM SERPL-SCNC: 141 MMOL/L (ref 135–145)
WBC # BLD AUTO: 8.9 10E3/UL (ref 4–11)

## 2025-02-19 PROCEDURE — 85027 COMPLETE CBC AUTOMATED: CPT

## 2025-02-19 PROCEDURE — 84270 ASSAY OF SEX HORMONE GLOBUL: CPT

## 2025-02-19 PROCEDURE — 80053 COMPREHEN METABOLIC PANEL: CPT

## 2025-02-19 PROCEDURE — 99207 PR NO CHARGE NURSE ONLY: CPT

## 2025-02-19 PROCEDURE — 36415 COLL VENOUS BLD VENIPUNCTURE: CPT

## 2025-02-19 NOTE — PROGRESS NOTES
Bon Perkins is a 49 year old year old patient who comes in today for a Blood Pressure check because of medication change was started on hyzaar 50-12.5 mg 1 tab po daily on 11/4/24, has been all out of his medication x 1 week, states he has been taking atenolol 50 mg 1 tab po daily did not realize this was discontinued.       Vital Signs as repeated by RN /100 P 84  Patient is not taking medication as prescribed  Patient is tolerating medications well.  Patient is not monitoring Blood Pressure at home.  Average readings if yes are N/A  Current complaints: none    Disposition:  BP/P reading routed to Dr. Sergio Ulloa to review/advise, patient to check BP weekly notifying care team of readings consistently > 140/90 or < 100/60 and to call as needed.     Patient was added on to the lab for his standing or CBC with diff.     Patient will need a 30 day supply of his medication sent to [facility] , then tomorrow will need 90 day supply sent to [facility].     Julie Behrendt RN

## 2025-02-20 LAB — SHBG SERPL-SCNC: 19 NMOL/L (ref 11–80)

## 2025-02-23 LAB
TESTOST FREE SERPL-MCNC: 10.85 NG/DL
TESTOST SERPL-MCNC: 404 NG/DL (ref 240–950)

## 2025-02-26 ENCOUNTER — TELEPHONE (OUTPATIENT)
Dept: ENDOCRINOLOGY | Facility: CLINIC | Age: 50
End: 2025-02-26

## 2025-02-26 NOTE — TELEPHONE ENCOUNTER
----- Message from Freya Cortés sent at 2/26/2025  9:31 AM CST -----  Bon,  Labs look great. Continue with your current medications. Let's follow-up in 1 yr.    Freya

## 2025-03-01 ENCOUNTER — HEALTH MAINTENANCE LETTER (OUTPATIENT)
Age: 50
End: 2025-03-01

## 2025-03-03 DIAGNOSIS — R79.89 LOW TESTOSTERONE IN MALE: ICD-10-CM

## 2025-03-03 DIAGNOSIS — I10 ESSENTIAL HYPERTENSION WITH GOAL BLOOD PRESSURE LESS THAN 140/90: ICD-10-CM

## 2025-03-03 RX ORDER — TESTOSTERONE CYPIONATE 200 MG/ML
150 INJECTION, SOLUTION INTRAMUSCULAR
Qty: 4 ML | Refills: 5 | Status: SHIPPED | OUTPATIENT
Start: 2025-03-03

## 2025-03-03 RX ORDER — LOSARTAN POTASSIUM AND HYDROCHLOROTHIAZIDE 12.5; 5 MG/1; MG/1
1 TABLET ORAL DAILY
Qty: 90 TABLET | Refills: 2 | Status: SHIPPED | OUTPATIENT
Start: 2025-03-03

## 2025-03-03 RX ORDER — SYRINGE WITH NEEDLE, 1 ML 25GX5/8"
SYRINGE, EMPTY DISPOSABLE MISCELLANEOUS
Qty: 50 EACH | Refills: 1 | Status: SHIPPED | OUTPATIENT
Start: 2025-03-03

## 2025-03-03 NOTE — TELEPHONE ENCOUNTER
"Last Written Prescription:  testosterone cypionate (DEPOTESTOSTERONE) 200 MG/ML injection 4 mL 5 2/18/2025 -- No   Sig - Route: Inject 0.75 mLs (150 mg) into the muscle every 14 days. - Intramuscular     syringe 23G X 1\" 3 ML MISC 50 each 3 1/8/2025 -- No   Sig - Route: 1 each every 10 days. - Does not apply   Sent to pharmacy as: Syringe 23G X 1\" 3 ML       Last Visit Date: 1-8-25  Future Visit Date: NONE  ----------------------    Pharmacy comment: YOUR PATIENT REQUESTED AND EXPRESSLY CONSENTED FOR THIS RX TO BE FILLED AT OUR PHARMACY. THIS INFORMATION IS PATIENT PROVIDED, NOT CLAIM BASED. PLEASE VERIFY CURRENT THERAPY. 90-DAY SUPPLY REQUESTED     Refill decision: Medication unable to be refilled by RN due to:   Controlled medication   NEW PHARM REQUESTING RX FOR MEDICATION    Please clarify: Med is every 14 days, syringe is every 10 days        Request from pharmacy:  Requested Prescriptions   Pending Prescriptions Disp Refills    testosterone cypionate (DEPOTESTOSTERONE) 200 MG/ML injection [Pharmacy Med Name: TESTOSTERONE CYP SDV 1ML 200MG/ML]  0       Androgen Agents Failed - 3/3/2025  3:17 PM        Failed - Medication is active on med list and the sig matches. RN to manually verify dose and sig if red X/fail.     If the protocol passes (green check), you do not need to verify med dose and sig.    A prescription matches if they are the same clinical intention.    For Example: once daily and every morning are the same.    For all fails (red x), verify dose and sig.    If the refill does match what is on file, the RN can still proceed to approve the refill request.     If they do not match, route to the appropriate provider.             Failed - Serum PSA on file within past 12 mos     No results found for: \"PSA\"          Failed - Refills for this classification require provider review        Failed - Most recent blood pressure under 140/90 in past 6 months     BP Readings from Last 3 Encounters:   02/19/25 (!) " "134/100   11/04/24 (!) 158/100   07/02/24 (!) 138/102       No data recorded            Passed - Patient is of age 12 and older        Passed - Lipid panel on file in past 12 mos     Recent Labs   Lab Test 11/04/24  1641   CHOL 223*   TRIG 322*   HDL 49   *   NHDL 174*               Passed - ALT on file within past 12 mos     Recent Labs   Lab Test 02/19/25  1131   ALT 73*             Passed - HCT less than 54% on file within past 12 mos     Recent Labs   Lab Test 02/19/25  1131   HCT 52.6             Passed - Serum Testosterone on file within past 12 mos     Recent Labs   Lab Test 02/19/25  1131   TESTOSTTOTAL 404             Passed - Recent (6 mo) or future (90 days) visit within the authorizing provider's specialty     Patient had office visit in the last 6 months or has a visit in the next 30 days with authorizing provider or within the authorizing provider's specialty.  See \"Patient Info\" tab in inbasket, or \"Choose Columns\" in Meds & Orders section of the refill encounter.            Passed - Patient is not pregnant        Passed - No positive pregnancy test on file within past 12 mos        Passed - AST on file within past 12 mos     Recent Labs   Lab Test 02/19/25  1131   AST 43               syringe/needle (disp) (B-D LUER-MARCELA SYRINGE) 23G X 1\" 3 ML MISC [Pharmacy Med Name: BD SYR/DRE LUER-MARCELA 3ML #9571 23G 1]  0       There is no refill protocol information for this order          "

## 2025-03-05 ENCOUNTER — TELEPHONE (OUTPATIENT)
Dept: FAMILY MEDICINE | Facility: CLINIC | Age: 50
End: 2025-03-05
Payer: COMMERCIAL

## 2025-03-05 NOTE — TELEPHONE ENCOUNTER
Patient Quality Outreach    Patient is due for the following:   Hypertension -  BP check    Action(s) Taken:   Schedule a nurse only visit for bp    Type of outreach:    Sent [facility] message.    Questions for provider review:    None           Nurys Montelongo CMA

## 2025-04-21 ENCOUNTER — MYC REFILL (OUTPATIENT)
Dept: FAMILY MEDICINE | Facility: CLINIC | Age: 50
End: 2025-04-21
Payer: COMMERCIAL

## 2025-04-21 DIAGNOSIS — N52.9 ERECTILE DYSFUNCTION, UNSPECIFIED ERECTILE DYSFUNCTION TYPE: ICD-10-CM

## 2025-04-21 RX ORDER — SILDENAFIL 50 MG/1
50 TABLET, FILM COATED ORAL DAILY PRN
Qty: 18 TABLET | Refills: 3 | Status: SHIPPED | OUTPATIENT
Start: 2025-04-21

## 2025-04-24 ENCOUNTER — LAB (OUTPATIENT)
Dept: LAB | Facility: CLINIC | Age: 50
End: 2025-04-24
Payer: COMMERCIAL

## 2025-04-24 ENCOUNTER — ALLIED HEALTH/NURSE VISIT (OUTPATIENT)
Dept: FAMILY MEDICINE | Facility: CLINIC | Age: 50
End: 2025-04-24
Payer: COMMERCIAL

## 2025-04-24 ENCOUNTER — TELEPHONE (OUTPATIENT)
Dept: FAMILY MEDICINE | Facility: CLINIC | Age: 50
End: 2025-04-24

## 2025-04-24 VITALS — HEART RATE: 100 BPM | DIASTOLIC BLOOD PRESSURE: 98 MMHG | SYSTOLIC BLOOD PRESSURE: 148 MMHG

## 2025-04-24 DIAGNOSIS — R79.89 LOW TESTOSTERONE IN MALE: ICD-10-CM

## 2025-04-24 DIAGNOSIS — I10 ESSENTIAL HYPERTENSION WITH GOAL BLOOD PRESSURE LESS THAN 140/90: Primary | ICD-10-CM

## 2025-04-24 DIAGNOSIS — I10 ESSENTIAL HYPERTENSION WITH GOAL BLOOD PRESSURE LESS THAN 140/90: ICD-10-CM

## 2025-04-24 LAB
ALBUMIN SERPL BCG-MCNC: 4.6 G/DL (ref 3.5–5.2)
ALP SERPL-CCNC: 95 U/L (ref 40–150)
ALT SERPL W P-5'-P-CCNC: 79 U/L (ref 0–70)
ANION GAP SERPL CALCULATED.3IONS-SCNC: 12 MMOL/L (ref 7–15)
AST SERPL W P-5'-P-CCNC: 50 U/L (ref 0–45)
BILIRUB SERPL-MCNC: 0.6 MG/DL
BUN SERPL-MCNC: 17.3 MG/DL (ref 6–20)
CALCIUM SERPL-MCNC: 9.3 MG/DL (ref 8.8–10.4)
CHLORIDE SERPL-SCNC: 98 MMOL/L (ref 98–107)
CREAT SERPL-MCNC: 0.73 MG/DL (ref 0.67–1.17)
EGFRCR SERPLBLD CKD-EPI 2021: >90 ML/MIN/1.73M2
ERYTHROCYTE [DISTWIDTH] IN BLOOD BY AUTOMATED COUNT: 12.6 % (ref 10–15)
GLUCOSE SERPL-MCNC: 180 MG/DL (ref 70–99)
HCO3 SERPL-SCNC: 25 MMOL/L (ref 22–29)
HCT VFR BLD AUTO: 54.5 % (ref 40–53)
HGB BLD-MCNC: 18.3 G/DL (ref 13.3–17.7)
MCH RBC QN AUTO: 30 PG (ref 26.5–33)
MCHC RBC AUTO-ENTMCNC: 33.6 G/DL (ref 31.5–36.5)
MCV RBC AUTO: 89 FL (ref 78–100)
PLATELET # BLD AUTO: 135 10E3/UL (ref 150–450)
POTASSIUM SERPL-SCNC: 4 MMOL/L (ref 3.4–5.3)
PROT SERPL-MCNC: 7.2 G/DL (ref 6.4–8.3)
RBC # BLD AUTO: 6.11 10E6/UL (ref 4.4–5.9)
SODIUM SERPL-SCNC: 135 MMOL/L (ref 135–145)
WBC # BLD AUTO: 9.2 10E3/UL (ref 4–11)

## 2025-04-24 NOTE — NURSING NOTE
Bon Perkins is a 49 year old year old patient who comes in today for a Blood Pressure check because of ongoing blood pressure monitoring.  Vital Signs as repeated by /108 P 100. Recheck  148/98  Patient is taking medication as prescribed  Patient is tolerating medications well.  Patient is not monitoring Blood Pressure at home.    Current complaints: none  Disposition:  routing to provider to review    *Patient states he has been taking Atenolol 50 mg as well. Needs refill of this.    Pended medication in 4/24 TE.    TYREE Quinonez [facility]      
23.3

## 2025-04-24 NOTE — TELEPHONE ENCOUNTER
Bon Perkins is a 49 year old year old patient who comes in today for a Blood Pressure check because of ongoing blood pressure monitoring.  Vital Signs as repeated by /108 P 100. Recheck  148/98  Patient is taking medication as prescribed  Patient is tolerating medications well.  Patient is not monitoring Blood Pressure at home.    Current complaints: none  Disposition:  routing to provider to review    *Patient states he has been taking Atenolol 50 mg as well. Needs refill of this.    Pended medication for refill consideration.    Ranjit LEWIS RN  [facility]

## 2025-04-27 LAB
TESTOST FREE SERPL-MCNC: 6.78 NG/DL
TESTOST SERPL-MCNC: 263 NG/DL (ref 240–950)

## 2025-04-30 DIAGNOSIS — R79.89 LOW TESTOSTERONE IN MALE: ICD-10-CM

## 2025-04-30 RX ORDER — TESTOSTERONE CYPIONATE 200 MG/ML
150 INJECTION, SOLUTION INTRAMUSCULAR
Qty: 4 ML | Refills: 5 | Status: SHIPPED | OUTPATIENT
Start: 2025-04-30

## 2025-05-12 RX ORDER — ATENOLOL 50 MG/1
50 TABLET ORAL DAILY
Qty: 90 TABLET | Refills: 0 | Status: SHIPPED | OUTPATIENT
Start: 2025-05-12 | End: 2025-05-14

## 2025-05-14 ENCOUNTER — OFFICE VISIT (OUTPATIENT)
Dept: FAMILY MEDICINE | Facility: CLINIC | Age: 50
End: 2025-05-14
Payer: COMMERCIAL

## 2025-05-14 VITALS
HEART RATE: 101 BPM | HEIGHT: 75 IN | SYSTOLIC BLOOD PRESSURE: 140 MMHG | DIASTOLIC BLOOD PRESSURE: 100 MMHG | TEMPERATURE: 98.6 F | OXYGEN SATURATION: 96 % | BODY MASS INDEX: 31.95 KG/M2 | RESPIRATION RATE: 16 BRPM | WEIGHT: 257 LBS

## 2025-05-14 DIAGNOSIS — E11.22 TYPE 2 DIABETES MELLITUS WITH STAGE 1 CHRONIC KIDNEY DISEASE, WITHOUT LONG-TERM CURRENT USE OF INSULIN (H): Primary | ICD-10-CM

## 2025-05-14 DIAGNOSIS — I10 ESSENTIAL HYPERTENSION WITH GOAL BLOOD PRESSURE LESS THAN 140/90: ICD-10-CM

## 2025-05-14 DIAGNOSIS — N18.1 TYPE 2 DIABETES MELLITUS WITH STAGE 1 CHRONIC KIDNEY DISEASE, WITHOUT LONG-TERM CURRENT USE OF INSULIN (H): Primary | ICD-10-CM

## 2025-05-14 DIAGNOSIS — M1A.09X0 IDIOPATHIC CHRONIC GOUT OF MULTIPLE SITES WITHOUT TOPHUS: ICD-10-CM

## 2025-05-14 PROCEDURE — 3080F DIAST BP >= 90 MM HG: CPT | Performed by: FAMILY MEDICINE

## 2025-05-14 PROCEDURE — 3077F SYST BP >= 140 MM HG: CPT | Performed by: FAMILY MEDICINE

## 2025-05-14 PROCEDURE — G2211 COMPLEX E/M VISIT ADD ON: HCPCS | Performed by: FAMILY MEDICINE

## 2025-05-14 PROCEDURE — 99214 OFFICE O/P EST MOD 30 MIN: CPT | Performed by: FAMILY MEDICINE

## 2025-05-14 RX ORDER — GLIPIZIDE 5 MG/1
5 TABLET, FILM COATED, EXTENDED RELEASE ORAL DAILY
Qty: 90 TABLET | Refills: 0 | Status: SHIPPED | OUTPATIENT
Start: 2025-05-14 | End: 2025-08-12

## 2025-05-14 RX ORDER — FLASH GLUCOSE SENSOR
KIT MISCELLANEOUS
Qty: 6 EACH | Refills: 3 | Status: SHIPPED | OUTPATIENT
Start: 2025-05-14

## 2025-05-14 RX ORDER — LOSARTAN POTASSIUM AND HYDROCHLOROTHIAZIDE 25; 100 MG/1; MG/1
1 TABLET ORAL DAILY
Qty: 90 TABLET | Refills: 3 | Status: SHIPPED | OUTPATIENT
Start: 2025-05-14

## 2025-05-14 RX ORDER — ATENOLOL 25 MG/1
50 TABLET ORAL DAILY
Qty: 60 TABLET | Refills: 0 | Status: SHIPPED | OUTPATIENT
Start: 2025-05-14

## 2025-05-14 NOTE — PROGRESS NOTES
"  Assessment & Plan     Type 2 diabetes mellitus with stage 1 chronic kidney disease, without long-term current use of insulin (H)  Hgba1c above goal of 7  Patient wants to stop jardiance 25mg as it has not helped with BG control  Cgm reads 200 average  Discussed recommending adding glipizide 5mg, continue semaglutide and jardiance for now  Follow up in 3 months  Patient continues to require cgm due to active medication adjustment and uncontrolled diabetes  - HEMOGLOBIN A1C; Future  - Semaglutide, 2 MG/DOSE, (OZEMPIC) 8 MG/3ML pen; Inject 2 mg subcutaneously every 7 days.  - glipiZIDE (GLUCOTROL XL) 5 MG 24 hr tablet; Take 1 tablet (5 mg) by mouth daily.  - Continuous Glucose Sensor (FREESTYLE WOJCIECH 14 DAY SENSOR) MISC; CHANGE SENSOR EVERY 14 DAYS  - PRIMARY CARE FOLLOW-UP SCHEDULING; Future    Essential hypertension with goal blood pressure less than 140/90  Increase losartan-hydrochlorothiazide to 100-25  Try to titrate off atenolol once bp <120/80  Follow up in 3 months  - atenolol (TENORMIN) 25 MG tablet; Take 2 tablets (50 mg) by mouth daily.  - losartan-hydrochlorothiazide (HYZAAR) 100-25 MG tablet; Take 1 tablet by mouth daily.    Idiopathic chronic gout of multiple sites without tophus  stable  - Uric acid; Future    The longitudinal plan of care for the diagnosis(es)/condition(s) as documented were addressed during this visit. Due to the added complexity in care, I will continue to support Bon in the subsequent management and with ongoing continuity of care.        BMI  Estimated body mass index is 32.33 kg/m  as calculated from the following:    Height as of this encounter: 1.899 m (6' 2.76\").    Weight as of this encounter: 116.6 kg (257 lb).             Subjective   Bon is a 49 year old, presenting for the following health issues:  Diabetes (Wants the new freestyle wojciech. Wants discuss getting a different medication then Jardiance)        5/14/2025     7:00 AM   Additional Questions   Roomed by " "donny   Accompanied by self         5/14/2025     7:00 AM   Patient Reported Additional Medications   Patient reports taking the following new medications none     History of Present Illness       Diabetes:   He presents for follow up of diabetes.  He is checking home blood glucose two times daily.   He checks blood glucose before meals.  Blood glucose is sometimes over 200 and never under 70. He is aware of hypoglycemia symptoms including blurred vision.   He is concerned about blood sugar frequently over 200.   He is having numbness in feet, burning in feet and blurry vision.  The patient has not had a diabetic eye exam in the last 12 months.          He eats 0-1 servings of fruits and vegetables daily.He consumes 0 sweetened beverage(s) daily.He exercises with enough effort to increase his heart rate 9 or less minutes per day.  He exercises with enough effort to increase his heart rate 3 or less days per week.   He is taking medications regularly.            Hypertension Follow-up    Do you check your blood pressure regularly outside of the clinic? No   Are you following a low salt diet? Yes  Are your blood pressures ever more than 140 on the top number (systolic) OR more   than 90 on the bottom number (diastolic), for example 140/90? Yes    BP Readings from Last 2 Encounters:   05/14/25 (!) 150/110   04/24/25 (!) 148/98             Objective    BP (!) 150/110   Pulse 101   Temp 98.6  F (37  C) (Tympanic)   Resp 16   Ht 1.899 m (6' 2.76\")   Wt 116.6 kg (257 lb)   SpO2 96%   BMI 32.33 kg/m    Body mass index is 32.33 kg/m .  Physical Exam   GENERAL: alert and no distress  RESP: lungs clear to auscultation - no rales, rhonchi or wheezes  CV: regular rate and rhythm, normal S1 S2, no S3 or S4, no murmur, click or rub, no peripheral edema  MS: no gross musculoskeletal defects noted, no edema            Signed Electronically by: Vanessa Carrillo MD    "

## 2025-06-21 ENCOUNTER — HEALTH MAINTENANCE LETTER (OUTPATIENT)
Age: 50
End: 2025-06-21

## 2025-06-21 ENCOUNTER — MYC REFILL (OUTPATIENT)
Dept: FAMILY MEDICINE | Facility: CLINIC | Age: 50
End: 2025-06-21
Payer: COMMERCIAL

## 2025-06-21 DIAGNOSIS — E11.22 TYPE 2 DIABETES MELLITUS WITH STAGE 1 CHRONIC KIDNEY DISEASE, WITHOUT LONG-TERM CURRENT USE OF INSULIN (H): ICD-10-CM

## 2025-06-21 DIAGNOSIS — N18.1 TYPE 2 DIABETES MELLITUS WITH STAGE 1 CHRONIC KIDNEY DISEASE, WITHOUT LONG-TERM CURRENT USE OF INSULIN (H): ICD-10-CM

## 2025-06-22 DIAGNOSIS — F41.9 ANXIETY: ICD-10-CM

## 2025-06-23 RX ORDER — VENLAFAXINE 75 MG/1
75 TABLET ORAL DAILY
Qty: 90 TABLET | Refills: 0 | Status: SHIPPED | OUTPATIENT
Start: 2025-06-23

## 2025-06-24 ENCOUNTER — LAB (OUTPATIENT)
Dept: LAB | Facility: CLINIC | Age: 50
End: 2025-06-24
Payer: COMMERCIAL

## 2025-06-24 DIAGNOSIS — M1A.09X0 IDIOPATHIC CHRONIC GOUT OF MULTIPLE SITES WITHOUT TOPHUS: ICD-10-CM

## 2025-06-24 DIAGNOSIS — I10 ESSENTIAL HYPERTENSION WITH GOAL BLOOD PRESSURE LESS THAN 140/90: ICD-10-CM

## 2025-06-24 DIAGNOSIS — E11.22 TYPE 2 DIABETES MELLITUS WITH STAGE 1 CHRONIC KIDNEY DISEASE, WITHOUT LONG-TERM CURRENT USE OF INSULIN (H): ICD-10-CM

## 2025-06-24 DIAGNOSIS — N18.1 CKD (CHRONIC KIDNEY DISEASE) STAGE 1, GFR 90 ML/MIN OR GREATER: ICD-10-CM

## 2025-06-24 DIAGNOSIS — N18.1 TYPE 2 DIABETES MELLITUS WITH STAGE 1 CHRONIC KIDNEY DISEASE, WITHOUT LONG-TERM CURRENT USE OF INSULIN (H): ICD-10-CM

## 2025-06-24 LAB
BASOPHILS # BLD AUTO: 0 10E3/UL (ref 0–0.2)
BASOPHILS NFR BLD AUTO: 0 %
EOSINOPHIL # BLD AUTO: 0.1 10E3/UL (ref 0–0.7)
EOSINOPHIL NFR BLD AUTO: 1 %
ERYTHROCYTE [DISTWIDTH] IN BLOOD BY AUTOMATED COUNT: 12.4 % (ref 10–15)
EST. AVERAGE GLUCOSE BLD GHB EST-MCNC: 197 MG/DL
HBA1C MFR BLD: 8.5 % (ref 0–5.6)
HCT VFR BLD AUTO: 52.9 % (ref 40–53)
HGB BLD-MCNC: 17.8 G/DL (ref 13.3–17.7)
IMM GRANULOCYTES # BLD: 0.2 10E3/UL
IMM GRANULOCYTES NFR BLD: 2 %
LYMPHOCYTES # BLD AUTO: 1.5 10E3/UL (ref 0.8–5.3)
LYMPHOCYTES NFR BLD AUTO: 14 %
MCH RBC QN AUTO: 30.2 PG (ref 26.5–33)
MCHC RBC AUTO-ENTMCNC: 33.6 G/DL (ref 31.5–36.5)
MCV RBC AUTO: 90 FL (ref 78–100)
MONOCYTES # BLD AUTO: 0.7 10E3/UL (ref 0–1.3)
MONOCYTES NFR BLD AUTO: 6 %
NEUTROPHILS # BLD AUTO: 7.9 10E3/UL (ref 1.6–8.3)
NEUTROPHILS NFR BLD AUTO: 76 %
PLATELET # BLD AUTO: 129 10E3/UL (ref 150–450)
RBC # BLD AUTO: 5.9 10E6/UL (ref 4.4–5.9)
URATE SERPL-MCNC: 6.1 MG/DL (ref 3.4–7)
WBC # BLD AUTO: 10.4 10E3/UL (ref 4–11)

## 2025-06-24 PROCEDURE — 84550 ASSAY OF BLOOD/URIC ACID: CPT

## 2025-06-24 PROCEDURE — 83036 HEMOGLOBIN GLYCOSYLATED A1C: CPT

## 2025-06-24 PROCEDURE — 85025 COMPLETE CBC W/AUTO DIFF WBC: CPT

## 2025-06-24 PROCEDURE — 36415 COLL VENOUS BLD VENIPUNCTURE: CPT

## 2025-06-27 ENCOUNTER — RESULTS FOLLOW-UP (OUTPATIENT)
Dept: FAMILY MEDICINE | Facility: CLINIC | Age: 50
End: 2025-06-27

## 2025-07-15 ENCOUNTER — PATIENT OUTREACH (OUTPATIENT)
Dept: CARE COORDINATION | Facility: CLINIC | Age: 50
End: 2025-07-15
Payer: COMMERCIAL

## 2025-08-12 ENCOUNTER — MYC REFILL (OUTPATIENT)
Dept: FAMILY MEDICINE | Facility: CLINIC | Age: 50
End: 2025-08-12
Payer: COMMERCIAL

## 2025-08-12 DIAGNOSIS — N52.9 ERECTILE DYSFUNCTION, UNSPECIFIED ERECTILE DYSFUNCTION TYPE: ICD-10-CM

## 2025-08-12 RX ORDER — SILDENAFIL 50 MG/1
50 TABLET, FILM COATED ORAL DAILY PRN
Qty: 18 TABLET | Refills: 3 | Status: SHIPPED | OUTPATIENT
Start: 2025-08-12

## 2025-08-26 ENCOUNTER — TELEPHONE (OUTPATIENT)
Dept: FAMILY MEDICINE | Facility: CLINIC | Age: 50
End: 2025-08-26
Payer: COMMERCIAL

## (undated) DEVICE — ENDO SNARE EXACTO COLD 9MM LOOP 2.4MMX230CM 00711115

## (undated) RX ORDER — PROPOFOL 10 MG/ML
INJECTION, EMULSION INTRAVENOUS
Status: DISPENSED
Start: 2022-09-06